# Patient Record
Sex: MALE | Race: WHITE | NOT HISPANIC OR LATINO | Employment: OTHER | URBAN - NONMETROPOLITAN AREA
[De-identification: names, ages, dates, MRNs, and addresses within clinical notes are randomized per-mention and may not be internally consistent; named-entity substitution may affect disease eponyms.]

---

## 2019-07-14 ENCOUNTER — HOSPITAL ENCOUNTER (EMERGENCY)
Facility: HOSPITAL | Age: 24
Discharge: LEFT BEFORE DISCHARGE | End: 2019-07-14

## 2019-07-14 VITALS
SYSTOLIC BLOOD PRESSURE: 153 MMHG | HEART RATE: 114 BPM | OXYGEN SATURATION: 96 % | TEMPERATURE: 97.9 F | RESPIRATION RATE: 20 BRPM | HEIGHT: 67 IN | BODY MASS INDEX: 49.44 KG/M2 | DIASTOLIC BLOOD PRESSURE: 97 MMHG | WEIGHT: 315 LBS

## 2020-06-18 ENCOUNTER — HOSPITAL ENCOUNTER (EMERGENCY)
Facility: HOSPITAL | Age: 25
Discharge: HOME | End: 2020-06-18
Attending: EMERGENCY MEDICINE | Admitting: EMERGENCY MEDICINE

## 2020-06-18 ENCOUNTER — APPOINTMENT (OUTPATIENT)
Dept: GENERAL RADIOLOGY | Facility: HOSPITAL | Age: 25
End: 2020-06-18

## 2020-06-18 VITALS
SYSTOLIC BLOOD PRESSURE: 126 MMHG | OXYGEN SATURATION: 97 % | HEART RATE: 120 BPM | RESPIRATION RATE: 20 BRPM | BODY MASS INDEX: 50.62 KG/M2 | HEIGHT: 66 IN | DIASTOLIC BLOOD PRESSURE: 70 MMHG | TEMPERATURE: 98.3 F | WEIGHT: 315 LBS

## 2020-06-18 DIAGNOSIS — R07.9 CHEST PAIN, UNSPECIFIED TYPE: Primary | ICD-10-CM

## 2020-06-18 LAB
ALBUMIN SERPL-MCNC: 4.2 G/DL (ref 3.5–5.2)
ALBUMIN/GLOB SERPL: 1.5 G/DL
ALP SERPL-CCNC: 72 U/L (ref 39–117)
ALT SERPL W P-5'-P-CCNC: 35 U/L (ref 1–41)
ANION GAP SERPL CALCULATED.3IONS-SCNC: 9 MMOL/L (ref 5–15)
AST SERPL-CCNC: 23 U/L (ref 1–40)
BASOPHILS # BLD AUTO: 0.04 10*3/MM3 (ref 0–0.2)
BASOPHILS NFR BLD AUTO: 0.4 % (ref 0–1.5)
BILIRUB SERPL-MCNC: 0.6 MG/DL (ref 0.2–1.2)
BUN BLD-MCNC: 14 MG/DL (ref 6–20)
BUN/CREAT SERPL: 18.9 (ref 7–25)
CALCIUM SPEC-SCNC: 8.9 MG/DL (ref 8.6–10.5)
CHLORIDE SERPL-SCNC: 104 MMOL/L (ref 98–107)
CO2 SERPL-SCNC: 27 MMOL/L (ref 22–29)
CREAT BLD-MCNC: 0.74 MG/DL (ref 0.76–1.27)
D-DIMER, QUANTITATIVE (MAD,POW, STR): 295 NG/ML (FEU) (ref 0–470)
DEPRECATED RDW RBC AUTO: 41.6 FL (ref 37–54)
EOSINOPHIL # BLD AUTO: 0.26 10*3/MM3 (ref 0–0.4)
EOSINOPHIL NFR BLD AUTO: 2.6 % (ref 0.3–6.2)
ERYTHROCYTE [DISTWIDTH] IN BLOOD BY AUTOMATED COUNT: 13.4 % (ref 12.3–15.4)
GFR SERPL CREATININE-BSD FRML MDRD: 129 ML/MIN/1.73
GLOBULIN UR ELPH-MCNC: 2.8 GM/DL
GLUCOSE BLD-MCNC: 174 MG/DL (ref 65–99)
HCT VFR BLD AUTO: 42.9 % (ref 37.5–51)
HGB BLD-MCNC: 14.1 G/DL (ref 13–17.7)
HOLD SPECIMEN: NORMAL
IMM GRANULOCYTES # BLD AUTO: 0.03 10*3/MM3 (ref 0–0.05)
IMM GRANULOCYTES NFR BLD AUTO: 0.3 % (ref 0–0.5)
LYMPHOCYTES # BLD AUTO: 2.8 10*3/MM3 (ref 0.7–3.1)
LYMPHOCYTES NFR BLD AUTO: 28 % (ref 19.6–45.3)
MCH RBC QN AUTO: 27.8 PG (ref 26.6–33)
MCHC RBC AUTO-ENTMCNC: 32.9 G/DL (ref 31.5–35.7)
MCV RBC AUTO: 84.4 FL (ref 79–97)
MONOCYTES # BLD AUTO: 0.55 10*3/MM3 (ref 0.1–0.9)
MONOCYTES NFR BLD AUTO: 5.5 % (ref 5–12)
NEUTROPHILS # BLD AUTO: 6.31 10*3/MM3 (ref 1.7–7)
NEUTROPHILS NFR BLD AUTO: 63.2 % (ref 42.7–76)
NRBC BLD AUTO-RTO: 0 /100 WBC (ref 0–0.2)
NT-PROBNP SERPL-MCNC: 7.7 PG/ML (ref 5–450)
PLATELET # BLD AUTO: 332 10*3/MM3 (ref 140–450)
PMV BLD AUTO: 9.8 FL (ref 6–12)
POTASSIUM BLD-SCNC: 4.4 MMOL/L (ref 3.5–5.2)
PROT SERPL-MCNC: 7 G/DL (ref 6–8.5)
RBC # BLD AUTO: 5.08 10*6/MM3 (ref 4.14–5.8)
SODIUM BLD-SCNC: 140 MMOL/L (ref 136–145)
TROPONIN T SERPL-MCNC: <0.01 NG/ML (ref 0–0.03)
WBC NRBC COR # BLD: 9.99 10*3/MM3 (ref 3.4–10.8)
WHOLE BLOOD HOLD SPECIMEN: NORMAL

## 2020-06-18 PROCEDURE — 80053 COMPREHEN METABOLIC PANEL: CPT | Performed by: PHYSICIAN ASSISTANT

## 2020-06-18 PROCEDURE — 93005 ELECTROCARDIOGRAM TRACING: CPT | Performed by: PHYSICIAN ASSISTANT

## 2020-06-18 PROCEDURE — 71045 X-RAY EXAM CHEST 1 VIEW: CPT

## 2020-06-18 PROCEDURE — 84484 ASSAY OF TROPONIN QUANT: CPT | Performed by: PHYSICIAN ASSISTANT

## 2020-06-18 PROCEDURE — 85025 COMPLETE CBC W/AUTO DIFF WBC: CPT | Performed by: PHYSICIAN ASSISTANT

## 2020-06-18 PROCEDURE — 99283 EMERGENCY DEPT VISIT LOW MDM: CPT

## 2020-06-18 PROCEDURE — 85379 FIBRIN DEGRADATION QUANT: CPT | Performed by: PHYSICIAN ASSISTANT

## 2020-06-18 PROCEDURE — 83880 ASSAY OF NATRIURETIC PEPTIDE: CPT | Performed by: PHYSICIAN ASSISTANT

## 2020-06-18 PROCEDURE — 93010 ELECTROCARDIOGRAM REPORT: CPT | Performed by: INTERNAL MEDICINE

## 2020-06-18 NOTE — ED PROVIDER NOTES
"Subjective   Patient presents to emergency department for exertional chest pain he developed while walking outside today.  Describes chest pain as \"on the right side felt like someone shot me with a 50 caliber, on the left side it was just a dull pressure.  States pain is since resolved.  Denies any cough, fever, leg pain/swelling.      History provided by:  Patient   used: No    Chest Pain   Pain location:  L chest and R chest  Pain quality: aching, pressure and stabbing    Pain radiates to:  Does not radiate  Pain severity:  Moderate  Onset quality:  Sudden  Duration:  1 hour  Progression:  Resolved  Chronicity:  New  Context comment:  Exertion  Associated symptoms: no abdominal pain, no back pain, no cough, no dysphagia, no fever, no nausea, no shortness of breath, no vomiting and no weakness        Review of Systems   Constitutional: Negative for chills and fever.   HENT: Negative for sore throat and trouble swallowing.    Eyes: Negative for visual disturbance.   Respiratory: Negative for cough, shortness of breath and wheezing.    Cardiovascular: Positive for chest pain. Negative for leg swelling.   Gastrointestinal: Negative for abdominal pain, nausea and vomiting.   Genitourinary: Negative for dysuria and flank pain.   Musculoskeletal: Negative for back pain.   Skin: Negative for color change.   Allergic/Immunologic: Negative for immunocompromised state.   Neurological: Negative for syncope and weakness.   Hematological: Does not bruise/bleed easily.   Psychiatric/Behavioral: Negative for confusion.       History reviewed. No pertinent past medical history.    Allergies   Allergen Reactions   • Amoxicillin Rash   • Penicillins Rash     redness       History reviewed. No pertinent surgical history.    No family history on file.    Social History     Socioeconomic History   • Marital status: Legally      Spouse name: Not on file   • Number of children: Not on file   • Years of " "education: Not on file   • Highest education level: Not on file           Objective      /75 (BP Location: Left arm, Patient Position: Lying)   Pulse 117   Temp 98.3 °F (36.8 °C) (Oral)   Resp 20   Ht 167.6 cm (66\")   Wt (!) 195 kg (429 lb 8 oz)   SpO2 97%   BMI 69.32 kg/m²     Physical Exam   Constitutional: He is oriented to person, place, and time. He appears well-developed and well-nourished. No distress.   HENT:   Head: Normocephalic and atraumatic.   Eyes: Conjunctivae are normal.   Cardiovascular: Normal rate, regular rhythm and normal heart sounds.   Pulmonary/Chest: Effort normal and breath sounds normal. No respiratory distress. He has no wheezes.   Musculoskeletal: Normal range of motion. He exhibits no edema or tenderness.   Neurological: He is alert and oriented to person, place, and time.   Skin: Skin is warm and dry. Capillary refill takes less than 2 seconds.   Psychiatric: He has a normal mood and affect. His behavior is normal. Thought content normal.   Nursing note and vitals reviewed.      ECG 12 Lead    Date/Time: 6/18/2020 4:34 PM  Performed by: Eddy Villatoro PA-C  Authorized by: Eddy Villatoro PA-C   Interpreted by physician  Comparison: not compared with previous ECG   Previous ECG: no previous ECG available  Rhythm: sinus tachycardia  Rate: tachycardic  BPM: 120  ST Segments: ST segments normal  Clinical impression: non-specific ECG                 ED Course      Results for orders placed or performed during the hospital encounter of 06/18/20   Comprehensive Metabolic Panel   Result Value Ref Range    Glucose 174 (H) 65 - 99 mg/dL    BUN 14 6 - 20 mg/dL    Creatinine 0.74 (L) 0.76 - 1.27 mg/dL    Sodium 140 136 - 145 mmol/L    Potassium 4.4 3.5 - 5.2 mmol/L    Chloride 104 98 - 107 mmol/L    CO2 27.0 22.0 - 29.0 mmol/L    Calcium 8.9 8.6 - 10.5 mg/dL    Total Protein 7.0 6.0 - 8.5 g/dL    Albumin 4.20 3.50 - 5.20 g/dL    ALT (SGPT) 35 1 - 41 U/L    AST (SGOT) 23 1 " - 40 U/L    Alkaline Phosphatase 72 39 - 117 U/L    Total Bilirubin 0.6 0.2 - 1.2 mg/dL    eGFR Non African Amer 129 >60 mL/min/1.73    Globulin 2.8 gm/dL    A/G Ratio 1.5 g/dL    BUN/Creatinine Ratio 18.9 7.0 - 25.0    Anion Gap 9.0 5.0 - 15.0 mmol/L   Troponin   Result Value Ref Range    Troponin T <0.010 0.000 - 0.030 ng/mL   BNP   Result Value Ref Range    proBNP 7.7 5.0 - 450.0 pg/mL   CBC Auto Differential   Result Value Ref Range    WBC 9.99 3.40 - 10.80 10*3/mm3    RBC 5.08 4.14 - 5.80 10*6/mm3    Hemoglobin 14.1 13.0 - 17.7 g/dL    Hematocrit 42.9 37.5 - 51.0 %    MCV 84.4 79.0 - 97.0 fL    MCH 27.8 26.6 - 33.0 pg    MCHC 32.9 31.5 - 35.7 g/dL    RDW 13.4 12.3 - 15.4 %    RDW-SD 41.6 37.0 - 54.0 fl    MPV 9.8 6.0 - 12.0 fL    Platelets 332 140 - 450 10*3/mm3    Neutrophil % 63.2 42.7 - 76.0 %    Lymphocyte % 28.0 19.6 - 45.3 %    Monocyte % 5.5 5.0 - 12.0 %    Eosinophil % 2.6 0.3 - 6.2 %    Basophil % 0.4 0.0 - 1.5 %    Immature Grans % 0.3 0.0 - 0.5 %    Neutrophils, Absolute 6.31 1.70 - 7.00 10*3/mm3    Lymphocytes, Absolute 2.80 0.70 - 3.10 10*3/mm3    Monocytes, Absolute 0.55 0.10 - 0.90 10*3/mm3    Eosinophils, Absolute 0.26 0.00 - 0.40 10*3/mm3    Basophils, Absolute 0.04 0.00 - 0.20 10*3/mm3    Immature Grans, Absolute 0.03 0.00 - 0.05 10*3/mm3    nRBC 0.0 0.0 - 0.2 /100 WBC   D-dimer, Quantitative   Result Value Ref Range    D-Dimer, Quantitative 295 0 - 470 ng/mL (FEU)   Light Blue Top   Result Value Ref Range    Extra Tube hold for add-on    Gold Top - SST   Result Value Ref Range    Extra Tube Hold for add-ons.      Xr Chest 1 View    Result Date: 6/18/2020  Narrative: PROCEDURE: Single view AP portable chest x-ray upright at 3:43 PM. INDICATION: Chest pain. COMPARISON: None. The heart, hilar and mediastinal structures normal. Pulmonary vascularity normal. Lungs are clear. No pleural effusion. Bony structures unremarkable.     Impression: No acute disease. [id number] Electronically signed by:   Juan David Nugent MD  6/18/2020 4:02 PM CDT Workstation: [id number]         Discussed results with patient.  Gave educational materials.  Advised close follow up with PCP/Cardiology.  Return to emergency department for new or worsening symptoms.                                  HEART Score (for prediction of 6-week risk of major adverse cardiac event) reviewed and/or performed as part of the patient evaluation and treatment planning process.  The result associated with this review/performance is: 1    PERC Rule (for pulmonary embolism) reviewed and/or performed as part of the patient evaluation and treatment planning process.  The result associated with this review/performance is: 1       MDM    Final diagnoses:   Chest pain, unspecified type            Eddy Villatoro PA-C  06/18/20 7525

## 2020-06-18 NOTE — ED NOTES
Phone update given to maria luisa [phone] and pankaj [phone] at pt's request      Adriana Barber RN  06/18/20 3836

## 2020-10-09 ENCOUNTER — APPOINTMENT (OUTPATIENT)
Dept: GENERAL RADIOLOGY | Facility: HOSPITAL | Age: 25
End: 2020-10-09

## 2020-10-09 ENCOUNTER — HOSPITAL ENCOUNTER (EMERGENCY)
Facility: HOSPITAL | Age: 25
Discharge: HOME | End: 2020-10-09
Attending: EMERGENCY MEDICINE | Admitting: EMERGENCY MEDICINE

## 2020-10-09 ENCOUNTER — APPOINTMENT (OUTPATIENT)
Dept: CT IMAGING | Facility: HOSPITAL | Age: 25
End: 2020-10-09

## 2020-10-09 VITALS
RESPIRATION RATE: 18 BRPM | TEMPERATURE: 97 F | OXYGEN SATURATION: 95 % | HEIGHT: 67 IN | BODY MASS INDEX: 49.44 KG/M2 | HEART RATE: 90 BPM | WEIGHT: 315 LBS | DIASTOLIC BLOOD PRESSURE: 89 MMHG | SYSTOLIC BLOOD PRESSURE: 149 MMHG
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
## 2020-10-09 DIAGNOSIS — S83.91XA SPRAIN OF RIGHT KNEE, UNSPECIFIED LIGAMENT, INITIAL ENCOUNTER: ICD-10-CM

## 2020-10-09 DIAGNOSIS — S53.401A ELBOW SPRAIN, RIGHT, INITIAL ENCOUNTER: ICD-10-CM

## 2020-10-09 DIAGNOSIS — S00.93XA CONTUSION OF HEAD, UNSPECIFIED PART OF HEAD, INITIAL ENCOUNTER: ICD-10-CM

## 2020-10-09 PROCEDURE — 73564 X-RAY EXAM KNEE 4 OR MORE: CPT

## 2020-10-09 PROCEDURE — 99283 EMERGENCY DEPT VISIT LOW MDM: CPT

## 2020-10-09 PROCEDURE — 70450 CT HEAD/BRAIN W/O DYE: CPT

## 2020-10-09 PROCEDURE — 73080 X-RAY EXAM OF ELBOW: CPT

## 2020-10-09 NOTE — ED NOTES
Pt states that his right leg is swollen compared to his left leg.  States that he cannot take his boot off and his pants leg is tighter than the other leg, when I attempted to visualize the right lower extremity.       Lisette Hagan RN  10/09/20 1824

## 2020-10-09 NOTE — ED NOTES
Pt was at [employer] last night and he stepped on a manhole cover that was loose.  States that it turned inward, where his leg dropped inside the hole and he fell to the ground hitting his elbow and the manhole cover hit his right leg.       Lisette Hagan, RN  10/09/20 4753

## 2020-10-10 NOTE — DISCHARGE INSTRUCTIONS
Please return with new or worsening symptoms.  Rest, ice, and elevate the affected extremities to help with discomfort and swelling.

## 2020-10-10 NOTE — ED PROVIDER NOTES
Subjective   25-year-old male presents to the emergency department with complaint of right elbow pain, right knee pain, and headache after a fall.  The fall occurred greater than 24 hours ago.  He reports that he is continued to have some intermittent confusion, and intermittent pain.  Has not really taken anything to help with symptoms.  Reports he keeps hitting his elbow on things and that causes increasing pain.  Reports pain with ambulation at the knee.  Denies any vomiting, blurred or double vision.  Reports that he was walking when he stepped on a manhole cover that then flipped.  He reports that his leg felt down in the manhole and the cover fell down on his leg.  He reports he was able to get out on his own and walk.    Family history, surgical history, social history, current medications and allergies are reviewed with the patient and triage documentation and vitals are reviewed.      History provided by:  Patient   used: No        Review of Systems   Constitutional: Negative for chills and fever.   HENT: Negative for congestion and sore throat.    Eyes: Negative for photophobia, pain and visual disturbance.   Respiratory: Negative for cough, shortness of breath and wheezing.    Cardiovascular: Negative for chest pain, palpitations and leg swelling.   Gastrointestinal: Negative for abdominal pain, nausea and vomiting.   Endocrine: Negative for polydipsia, polyphagia and polyuria.   Genitourinary: Negative.    Musculoskeletal: Positive for arthralgias and joint swelling. Negative for back pain, myalgias, neck pain and neck stiffness.   Skin: Negative for color change and wound.   Allergic/Immunologic: Negative.    Neurological: Positive for headaches. Negative for dizziness, syncope, facial asymmetry, speech difficulty, weakness, light-headedness and numbness.   Hematological: Negative.    Psychiatric/Behavioral: Negative.        Past Medical History:   Diagnosis Date   • Arthritis    •  Asthma    • Carpal tunnel syndrome    • Depression    • Hypertension    • PTSD (post-traumatic stress disorder)        Allergies   Allergen Reactions   • Amoxicillin Rash   • Penicillins Rash     redness       Past Surgical History:   Procedure Laterality Date   • ADENOIDECTOMY     • APPENDECTOMY     • CHOLECYSTECTOMY     • TONSILLECTOMY         History reviewed. No pertinent family history.    Social History     Socioeconomic History   • Marital status: Legally      Spouse name: Not on file   • Number of children: Not on file   • Years of education: Not on file   • Highest education level: Not on file   Tobacco Use   • Smoking status: Former Smoker   • Smokeless tobacco: Former User   Substance and Sexual Activity   • Alcohol use: Not Currently   • Sexual activity: Defer           Objective   Physical Exam  Vitals signs and nursing note reviewed.   Constitutional:       General: He is not in acute distress.     Appearance: He is obese. He is not ill-appearing, toxic-appearing or diaphoretic.   HENT:      Head: Normocephalic and atraumatic.   Eyes:      Extraocular Movements: Extraocular movements intact.      Pupils: Pupils are equal, round, and reactive to light.   Neck:      Musculoskeletal: Normal range of motion and neck supple. No neck rigidity or muscular tenderness.   Cardiovascular:      Rate and Rhythm: Normal rate and regular rhythm.      Pulses: Normal pulses.      Heart sounds: Normal heart sounds. No murmur.   Pulmonary:      Effort: Pulmonary effort is normal. No respiratory distress.      Breath sounds: Normal breath sounds. No wheezing, rhonchi or rales.   Musculoskeletal:      Right elbow: He exhibits normal range of motion, no swelling, no effusion, no deformity and no laceration. Tenderness found. Olecranon process tenderness noted.      Right knee: He exhibits normal range of motion, no swelling, no effusion, no ecchymosis, no deformity, no laceration, normal alignment, no LCL laxity,  normal patellar mobility, no bony tenderness and no MCL laxity. Tenderness found.   Skin:     General: Skin is warm and dry.      Capillary Refill: Capillary refill takes less than 2 seconds.   Neurological:      General: No focal deficit present.      Mental Status: He is alert and oriented to person, place, and time.      Motor: No weakness.      Gait: Gait normal.   Psychiatric:         Mood and Affect: Mood normal.         Behavior: Behavior normal.         Procedures  none         ED Course    Labs Reviewed - No data to display  Xr Elbow 3+ View Right    Result Date: 10/9/2020  Narrative: Right elbow x-ray three views on 10/9/2020 Clinical indications: Right elbow pain after fall COMPARISON: None FINDINGS: There are no fractures. Visualized joints are well aligned. No joint effusion to suggest an occult fracture is noted. No bony abnormality is noted.     Impression: No acute abnormality. Electronically signed by:  Uche Nicholas  10/9/2020 10:14 PM CDT Workstation: [id number]    Xr Knee 4+ View Right    Result Date: 10/9/2020  Narrative: Right knee x-ray four views on 10/9/2020 Clinical indication: Pain after fall COMPARISON: None FINDINGS: There is osteophyte formation along the femoral condyles and tibial plateau consistent with changes of osteoarthritis. No joint effusion is noted. There are no fractures. Visualized joints are well aligned.     Impression: Mild changes of osteoarthritis with no acute bony abnormality. Electronically signed by:  Uche Nicholas  10/9/2020 10:15 PM CDT Workstation: [id number]    Ct Head Without Contrast    Result Date: 10/9/2020  Narrative: CT head without contrast on  10/9/2020 CLINICAL INDICATION: Fell and hit head, confusion, possible loss of consciousness TECHNIQUE: Multiple axial images are obtained throughout the head without the administration of contrast. This exam was performed according to our departmental dose-optimization program, which includes automated exposure  control, adjustment of the mA and/or kV according to patient size and/or use of iterative reconstruction technique. Total DLP is 1200.7 mGy*cm. COMPARISON: None FINDINGS:   There is no hydrocephalus. There is no CT evidence of acute infarct. There is no hemorrhage. There are no abnormal extra-axial fluid collections. There is no mass, mass effect or midline shift. Small left frontal calvarial hemangioma is incidentally noted. No acute bony abnormality is noted.     Impression: No acute intracranial abnormality. Electronically signed by:  Uche Nicholas  10/9/2020 10:54 PM CDT Workstation: [id number]          MDM  Number of Diagnoses or Management Options  Accidental fall, initial encounter:   Contusion of head, unspecified part of head, initial encounter:   Elbow sprain, right, initial encounter:   Sprain of right knee, unspecified ligament, initial encounter:      Amount and/or Complexity of Data Reviewed  Tests in the radiology section of CPT®: reviewed    Patient Progress  Patient progress: stable    Imaging unremarkable.  Advised on symptomatic treatment of contusion and sprain as well as cognitive rest and agreeable to discharge.    Final diagnoses:   Accidental fall, initial encounter   Contusion of head, unspecified part of head, initial encounter   Elbow sprain, right, initial encounter   Sprain of right knee, unspecified ligament, initial encounter            Jerrod Glynn, DO  10/10/20 0533

## 2020-10-19 ENCOUNTER — APPOINTMENT (OUTPATIENT)
Dept: GENERAL RADIOLOGY | Facility: HOSPITAL | Age: 25
End: 2020-10-19

## 2020-10-19 ENCOUNTER — APPOINTMENT (OUTPATIENT)
Dept: CT IMAGING | Facility: HOSPITAL | Age: 25
End: 2020-10-19

## 2020-10-19 ENCOUNTER — HOSPITAL ENCOUNTER (EMERGENCY)
Facility: HOSPITAL | Age: 25
Discharge: HOME | End: 2020-10-19
Attending: FAMILY MEDICINE | Admitting: FAMILY MEDICINE

## 2020-10-19 VITALS
DIASTOLIC BLOOD PRESSURE: 58 MMHG | TEMPERATURE: 97.5 F | HEIGHT: 67 IN | RESPIRATION RATE: 22 BRPM | HEART RATE: 92 BPM | WEIGHT: 315 LBS | SYSTOLIC BLOOD PRESSURE: 131 MMHG | OXYGEN SATURATION: 98 % | BODY MASS INDEX: 49.44 KG/M2

## 2020-10-19 DIAGNOSIS — R51.9 ACUTE NONINTRACTABLE HEADACHE, UNSPECIFIED HEADACHE TYPE: Primary | ICD-10-CM

## 2020-10-19 DIAGNOSIS — R55 SYNCOPE, UNSPECIFIED SYNCOPE TYPE: ICD-10-CM

## 2020-10-19 LAB
ALBUMIN SERPL-MCNC: 4 G/DL (ref 3.5–5.2)
ALBUMIN/GLOB SERPL: 1.5 G/DL
ALP SERPL-CCNC: 62 U/L (ref 39–117)
ALT SERPL W P-5'-P-CCNC: 31 U/L (ref 1–41)
ANION GAP SERPL CALCULATED.3IONS-SCNC: 9 MMOL/L (ref 5–15)
AST SERPL-CCNC: 26 U/L (ref 1–40)
BASOPHILS # BLD AUTO: 0.04 10*3/MM3 (ref 0–0.2)
BASOPHILS NFR BLD AUTO: 0.4 % (ref 0–1.5)
BILIRUB SERPL-MCNC: 0.4 MG/DL (ref 0–1.2)
BUN SERPL-MCNC: 12 MG/DL (ref 6–20)
BUN/CREAT SERPL: 17.4 (ref 7–25)
CALCIUM SPEC-SCNC: 8.9 MG/DL (ref 8.6–10.5)
CHLORIDE SERPL-SCNC: 102 MMOL/L (ref 98–107)
CK SERPL-CCNC: 180 U/L (ref 20–200)
CO2 SERPL-SCNC: 25 MMOL/L (ref 22–29)
CREAT SERPL-MCNC: 0.69 MG/DL (ref 0.76–1.27)
DEPRECATED RDW RBC AUTO: 42.9 FL (ref 37–54)
EOSINOPHIL # BLD AUTO: 0.33 10*3/MM3 (ref 0–0.4)
EOSINOPHIL NFR BLD AUTO: 3 % (ref 0.3–6.2)
ERYTHROCYTE [DISTWIDTH] IN BLOOD BY AUTOMATED COUNT: 13.5 % (ref 12.3–15.4)
GFR SERPL CREATININE-BSD FRML MDRD: 139 ML/MIN/1.73
GLOBULIN UR ELPH-MCNC: 2.6 GM/DL
GLUCOSE SERPL-MCNC: 186 MG/DL (ref 65–99)
HCT VFR BLD AUTO: 41 % (ref 37.5–51)
HGB BLD-MCNC: 13.3 G/DL (ref 13–17.7)
HOLD SPECIMEN: NORMAL
HOLD SPECIMEN: NORMAL
IMM GRANULOCYTES # BLD AUTO: 0.06 10*3/MM3 (ref 0–0.05)
IMM GRANULOCYTES NFR BLD AUTO: 0.5 % (ref 0–0.5)
LIPASE SERPL-CCNC: 30 U/L (ref 13–60)
LYMPHOCYTES # BLD AUTO: 2.72 10*3/MM3 (ref 0.7–3.1)
LYMPHOCYTES NFR BLD AUTO: 24.5 % (ref 19.6–45.3)
MAGNESIUM SERPL-MCNC: 1.8 MG/DL (ref 1.6–2.6)
MCH RBC QN AUTO: 28.1 PG (ref 26.6–33)
MCHC RBC AUTO-ENTMCNC: 32.4 G/DL (ref 31.5–35.7)
MCV RBC AUTO: 86.7 FL (ref 79–97)
MONOCYTES # BLD AUTO: 0.58 10*3/MM3 (ref 0.1–0.9)
MONOCYTES NFR BLD AUTO: 5.2 % (ref 5–12)
NEUTROPHILS NFR BLD AUTO: 66.4 % (ref 42.7–76)
NEUTROPHILS NFR BLD AUTO: 7.39 10*3/MM3 (ref 1.7–7)
NRBC BLD AUTO-RTO: 0 /100 WBC (ref 0–0.2)
NT-PROBNP SERPL-MCNC: 8.8 PG/ML (ref 0–450)
PLATELET # BLD AUTO: 308 10*3/MM3 (ref 140–450)
PMV BLD AUTO: 10 FL (ref 6–12)
POTASSIUM SERPL-SCNC: 3.6 MMOL/L (ref 3.5–5.2)
PROT SERPL-MCNC: 6.6 G/DL (ref 6–8.5)
RBC # BLD AUTO: 4.73 10*6/MM3 (ref 4.14–5.8)
SARS-COV-2 N GENE RESP QL NAA+PROBE: NOT DETECTED
SODIUM SERPL-SCNC: 136 MMOL/L (ref 136–145)
TROPONIN T SERPL-MCNC: <0.01 NG/ML (ref 0–0.03)
TROPONIN T SERPL-MCNC: <0.01 NG/ML (ref 0–0.03)
WBC # BLD AUTO: 11.12 10*3/MM3 (ref 3.4–10.8)
WHOLE BLOOD HOLD SPECIMEN: NORMAL
WHOLE BLOOD HOLD SPECIMEN: NORMAL

## 2020-10-19 PROCEDURE — 93005 ELECTROCARDIOGRAM TRACING: CPT

## 2020-10-19 PROCEDURE — 82550 ASSAY OF CK (CPK): CPT | Performed by: FAMILY MEDICINE

## 2020-10-19 PROCEDURE — 83735 ASSAY OF MAGNESIUM: CPT | Performed by: FAMILY MEDICINE

## 2020-10-19 PROCEDURE — 93005 ELECTROCARDIOGRAM TRACING: CPT | Performed by: FAMILY MEDICINE

## 2020-10-19 PROCEDURE — 70450 CT HEAD/BRAIN W/O DYE: CPT

## 2020-10-19 PROCEDURE — 71045 X-RAY EXAM CHEST 1 VIEW: CPT

## 2020-10-19 PROCEDURE — 80053 COMPREHEN METABOLIC PANEL: CPT | Performed by: FAMILY MEDICINE

## 2020-10-19 PROCEDURE — 93010 ELECTROCARDIOGRAM REPORT: CPT | Performed by: INTERNAL MEDICINE

## 2020-10-19 PROCEDURE — 87635 SARS-COV-2 COVID-19 AMP PRB: CPT | Performed by: FAMILY MEDICINE

## 2020-10-19 PROCEDURE — 83690 ASSAY OF LIPASE: CPT | Performed by: FAMILY MEDICINE

## 2020-10-19 PROCEDURE — 83880 ASSAY OF NATRIURETIC PEPTIDE: CPT | Performed by: FAMILY MEDICINE

## 2020-10-19 PROCEDURE — 84484 ASSAY OF TROPONIN QUANT: CPT | Performed by: FAMILY MEDICINE

## 2020-10-19 PROCEDURE — C9803 HOPD COVID-19 SPEC COLLECT: HCPCS | Performed by: FAMILY MEDICINE

## 2020-10-19 PROCEDURE — 99284 EMERGENCY DEPT VISIT MOD MDM: CPT

## 2020-10-19 PROCEDURE — 85025 COMPLETE CBC W/AUTO DIFF WBC: CPT | Performed by: FAMILY MEDICINE

## 2020-10-19 RX ORDER — SODIUM CHLORIDE 0.9 % (FLUSH) 0.9 %
10 SYRINGE (ML) INJECTION AS NEEDED
Status: DISCONTINUED | OUTPATIENT
Start: 2020-10-19 | End: 2020-10-19 | Stop reason: HOSPADM

## 2020-10-19 RX ADMIN — SODIUM CHLORIDE 1000 ML: 900 INJECTION, SOLUTION INTRAVENOUS at 19:11
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
## 2020-10-20 NOTE — DISCHARGE INSTRUCTIONS
Please return with new or worsening symptoms.  Contact the primary care providers listed above to establish care and follow-up on your symptoms.

## 2020-10-29 ENCOUNTER — LAB (OUTPATIENT)
Dept: LAB | Facility: HOSPITAL | Age: 25
End: 2020-10-29

## 2020-10-29 ENCOUNTER — OFFICE VISIT (OUTPATIENT)
Dept: FAMILY MEDICINE CLINIC | Facility: CLINIC | Age: 25
End: 2020-10-29

## 2020-10-29 VITALS
HEIGHT: 66 IN | SYSTOLIC BLOOD PRESSURE: 128 MMHG | OXYGEN SATURATION: 98 % | DIASTOLIC BLOOD PRESSURE: 74 MMHG | TEMPERATURE: 97.1 F | BODY MASS INDEX: 50.62 KG/M2 | WEIGHT: 315 LBS | HEART RATE: 93 BPM

## 2020-10-29 DIAGNOSIS — R55 SYNCOPE AND COLLAPSE: Primary | ICD-10-CM

## 2020-10-29 DIAGNOSIS — E66.01 MORBID OBESITY WITH BMI OF 60.0-69.9, ADULT (HCC): ICD-10-CM

## 2020-10-29 DIAGNOSIS — G56.02 CARPAL TUNNEL SYNDROME OF LEFT WRIST: ICD-10-CM

## 2020-10-29 DIAGNOSIS — R55 SYNCOPE AND COLLAPSE: ICD-10-CM

## 2020-10-29 LAB
FOLATE SERPL-MCNC: 8.71 NG/ML (ref 4.78–24.2)
HBA1C MFR BLD: 6.63 % (ref 4.8–5.6)
TSH SERPL DL<=0.05 MIU/L-ACNC: 2.06 UIU/ML (ref 0.27–4.2)
VIT B12 BLD-MCNC: 413 PG/ML (ref 211–946)

## 2020-10-29 PROCEDURE — 84443 ASSAY THYROID STIM HORMONE: CPT

## 2020-10-29 PROCEDURE — 90471 IMMUNIZATION ADMIN: CPT | Performed by: CHIROPRACTOR

## 2020-10-29 PROCEDURE — 36415 COLL VENOUS BLD VENIPUNCTURE: CPT

## 2020-10-29 PROCEDURE — 82607 VITAMIN B-12: CPT

## 2020-10-29 PROCEDURE — 83036 HEMOGLOBIN GLYCOSYLATED A1C: CPT

## 2020-10-29 PROCEDURE — 82746 ASSAY OF FOLIC ACID SERUM: CPT

## 2020-10-29 PROCEDURE — 90686 IIV4 VACC NO PRSV 0.5 ML IM: CPT | Performed by: CHIROPRACTOR

## 2020-10-29 PROCEDURE — 99213 OFFICE O/P EST LOW 20 MIN: CPT | Performed by: CHIROPRACTOR
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
## 2020-11-02 ENCOUNTER — TELEPHONE (OUTPATIENT)
Dept: FAMILY MEDICINE CLINIC | Facility: CLINIC | Age: 25
End: 2020-11-02

## 2020-11-02 DIAGNOSIS — R55 SYNCOPE AND COLLAPSE: Primary | ICD-10-CM

## 2020-11-16 ENCOUNTER — TELEPHONE (OUTPATIENT)
Dept: PULMONOLOGY | Facility: CLINIC | Age: 25
End: 2020-11-16

## 2020-11-16 NOTE — TELEPHONE ENCOUNTER
Called patient to discuss results of echocardiogram and see if he was still falling asleep unexpectedly.  Left message on his voicemail.  For him to call back.  Dr. Arambula

## 2020-11-16 NOTE — TELEPHONE ENCOUNTER
Called and spoke with Mr. Parra.  Discussed his negative echocardiogram results.  He is going to have his MRI done on the 30th about an hour and a half before his appointment with me.  Hopefully I will have the results back by the time of his appointment be able to discuss some with him at that time.  Advised him to be careful while driving.  Dr. Arambula

## 2020-12-22 PROCEDURE — U0003 INFECTIOUS AGENT DETECTION BY NUCLEIC ACID (DNA OR RNA); SEVERE ACUTE RESPIRATORY SYNDROME CORONAVIRUS 2 (SARS-COV-2) (CORONAVIRUS DISEASE [COVID-19]), AMPLIFIED PROBE TECHNIQUE, MAKING USE OF HIGH THROUGHPUT TECHNOLOGIES AS DESCRIBED BY CMS-2020-01-R: HCPCS | Performed by: NURSE PRACTITIONER

## 2021-03-03 ENCOUNTER — APPOINTMENT (OUTPATIENT)
Dept: GENERAL RADIOLOGY | Facility: HOSPITAL | Age: 26
End: 2021-03-03

## 2021-03-03 ENCOUNTER — HOSPITAL ENCOUNTER (EMERGENCY)
Facility: HOSPITAL | Age: 26
Discharge: HOME | End: 2021-03-03
Attending: FAMILY MEDICINE | Admitting: FAMILY MEDICINE

## 2021-03-03 VITALS
SYSTOLIC BLOOD PRESSURE: 155 MMHG | HEART RATE: 90 BPM | DIASTOLIC BLOOD PRESSURE: 91 MMHG | TEMPERATURE: 97.9 F | RESPIRATION RATE: 20 BRPM | WEIGHT: 315 LBS | HEIGHT: 67 IN | OXYGEN SATURATION: 99 % | BODY MASS INDEX: 49.44 KG/M2

## 2021-03-03 DIAGNOSIS — R19.7 DIARRHEA, UNSPECIFIED TYPE: Primary | ICD-10-CM

## 2021-03-03 DIAGNOSIS — R10.33 PERIUMBILICAL ABDOMINAL PAIN: ICD-10-CM

## 2021-03-03 LAB
ALBUMIN SERPL-MCNC: 3.9 G/DL (ref 3.5–5.2)
ALBUMIN/GLOB SERPL: 1.1 G/DL
ALP SERPL-CCNC: 76 U/L (ref 39–117)
ALT SERPL W P-5'-P-CCNC: 35 U/L (ref 1–41)
ANION GAP SERPL CALCULATED.3IONS-SCNC: 10 MMOL/L (ref 5–15)
AST SERPL-CCNC: 23 U/L (ref 1–40)
BASOPHILS # BLD AUTO: 0.04 10*3/MM3 (ref 0–0.2)
BASOPHILS NFR BLD AUTO: 0.3 % (ref 0–1.5)
BILIRUB SERPL-MCNC: 0.3 MG/DL (ref 0–1.2)
BILIRUB UR QL STRIP: NEGATIVE
BUN SERPL-MCNC: 16 MG/DL (ref 6–20)
BUN/CREAT SERPL: 25 (ref 7–25)
CALCIUM SPEC-SCNC: 9.2 MG/DL (ref 8.6–10.5)
CHLORIDE SERPL-SCNC: 100 MMOL/L (ref 98–107)
CK SERPL-CCNC: 180 U/L (ref 20–200)
CLARITY UR: CLEAR
CO2 SERPL-SCNC: 24 MMOL/L (ref 22–29)
COLOR UR: YELLOW
CREAT SERPL-MCNC: 0.64 MG/DL (ref 0.76–1.27)
D-LACTATE SERPL-SCNC: 1.5 MMOL/L (ref 0.5–2)
DEPRECATED RDW RBC AUTO: 43.9 FL (ref 37–54)
EOSINOPHIL # BLD AUTO: 0.31 10*3/MM3 (ref 0–0.4)
EOSINOPHIL NFR BLD AUTO: 2.7 % (ref 0.3–6.2)
ERYTHROCYTE [DISTWIDTH] IN BLOOD BY AUTOMATED COUNT: 14.1 % (ref 12.3–15.4)
GFR SERPL CREATININE-BSD FRML MDRD: >150 ML/MIN/1.73
GLOBULIN UR ELPH-MCNC: 3.4 GM/DL
GLUCOSE SERPL-MCNC: 191 MG/DL (ref 65–99)
GLUCOSE UR STRIP-MCNC: NEGATIVE MG/DL
HCT VFR BLD AUTO: 43.7 % (ref 37.5–51)
HGB BLD-MCNC: 14.6 G/DL (ref 13–17.7)
HGB UR QL STRIP.AUTO: NEGATIVE
HOLD SPECIMEN: NORMAL
HOLD SPECIMEN: NORMAL
IMM GRANULOCYTES # BLD AUTO: 0.06 10*3/MM3 (ref 0–0.05)
IMM GRANULOCYTES NFR BLD AUTO: 0.5 % (ref 0–0.5)
KETONES UR QL STRIP: NEGATIVE
LEUKOCYTE ESTERASE UR QL STRIP.AUTO: NEGATIVE
LIPASE SERPL-CCNC: 38 U/L (ref 13–60)
LYMPHOCYTES # BLD AUTO: 2.34 10*3/MM3 (ref 0.7–3.1)
LYMPHOCYTES NFR BLD AUTO: 20.4 % (ref 19.6–45.3)
MCH RBC QN AUTO: 28.7 PG (ref 26.6–33)
MCHC RBC AUTO-ENTMCNC: 33.4 G/DL (ref 31.5–35.7)
MCV RBC AUTO: 85.9 FL (ref 79–97)
MONOCYTES # BLD AUTO: 0.71 10*3/MM3 (ref 0.1–0.9)
MONOCYTES NFR BLD AUTO: 6.2 % (ref 5–12)
NEUTROPHILS NFR BLD AUTO: 69.9 % (ref 42.7–76)
NEUTROPHILS NFR BLD AUTO: 8.02 10*3/MM3 (ref 1.7–7)
NITRITE UR QL STRIP: NEGATIVE
NRBC BLD AUTO-RTO: 0 /100 WBC (ref 0–0.2)
PH UR STRIP.AUTO: 5.5 [PH] (ref 5–9)
PLATELET # BLD AUTO: 308 10*3/MM3 (ref 140–450)
PMV BLD AUTO: 10 FL (ref 6–12)
POTASSIUM SERPL-SCNC: 4.2 MMOL/L (ref 3.5–5.2)
PROT SERPL-MCNC: 7.3 G/DL (ref 6–8.5)
PROT UR QL STRIP: NEGATIVE
RBC # BLD AUTO: 5.09 10*6/MM3 (ref 4.14–5.8)
SODIUM SERPL-SCNC: 134 MMOL/L (ref 136–145)
SP GR UR STRIP: 1.03 (ref 1–1.03)
UROBILINOGEN UR QL STRIP: NORMAL
WBC # BLD AUTO: 11.48 10*3/MM3 (ref 3.4–10.8)
WHOLE BLOOD HOLD SPECIMEN: NORMAL
WHOLE BLOOD HOLD SPECIMEN: NORMAL

## 2021-03-03 PROCEDURE — 96376 TX/PRO/DX INJ SAME DRUG ADON: CPT

## 2021-03-03 PROCEDURE — 25010000002 MORPHINE PER 10 MG: Performed by: FAMILY MEDICINE

## 2021-03-03 PROCEDURE — 25010000002 ONDANSETRON PER 1 MG: Performed by: FAMILY MEDICINE

## 2021-03-03 PROCEDURE — 83605 ASSAY OF LACTIC ACID: CPT | Performed by: FAMILY MEDICINE

## 2021-03-03 PROCEDURE — 85025 COMPLETE CBC W/AUTO DIFF WBC: CPT | Performed by: FAMILY MEDICINE

## 2021-03-03 PROCEDURE — 82550 ASSAY OF CK (CPK): CPT | Performed by: FAMILY MEDICINE

## 2021-03-03 PROCEDURE — 96375 TX/PRO/DX INJ NEW DRUG ADDON: CPT

## 2021-03-03 PROCEDURE — 81003 URINALYSIS AUTO W/O SCOPE: CPT | Performed by: FAMILY MEDICINE

## 2021-03-03 PROCEDURE — 99284 EMERGENCY DEPT VISIT MOD MDM: CPT

## 2021-03-03 PROCEDURE — 87040 BLOOD CULTURE FOR BACTERIA: CPT | Performed by: FAMILY MEDICINE

## 2021-03-03 PROCEDURE — 80053 COMPREHEN METABOLIC PANEL: CPT | Performed by: FAMILY MEDICINE

## 2021-03-03 PROCEDURE — 83690 ASSAY OF LIPASE: CPT | Performed by: FAMILY MEDICINE

## 2021-03-03 PROCEDURE — 96374 THER/PROPH/DIAG INJ IV PUSH: CPT

## 2021-03-03 PROCEDURE — 74022 RADEX COMPL AQT ABD SERIES: CPT

## 2021-03-03 RX ORDER — ONDANSETRON 2 MG/ML
4 INJECTION INTRAMUSCULAR; INTRAVENOUS ONCE
Status: COMPLETED | OUTPATIENT
Start: 2021-03-03 | End: 2021-03-03

## 2021-03-03 RX ORDER — SODIUM CHLORIDE 0.9 % (FLUSH) 0.9 %
10 SYRINGE (ML) INJECTION AS NEEDED
Status: DISCONTINUED | OUTPATIENT
Start: 2021-03-03 | End: 2021-03-03 | Stop reason: HOSPADM

## 2021-03-03 RX ORDER — ONDANSETRON 4 MG/1
4 TABLET, ORALLY DISINTEGRATING ORAL EVERY 6 HOURS PRN
Qty: 10 TABLET | Refills: 0 | Status: SHIPPED | OUTPATIENT
Start: 2021-03-03 | End: 2022-02-09

## 2021-03-03 RX ORDER — SODIUM CHLORIDE 9 MG/ML
125 INJECTION, SOLUTION INTRAVENOUS CONTINUOUS
Status: DISCONTINUED | OUTPATIENT
Start: 2021-03-03 | End: 2021-03-03 | Stop reason: HOSPADM

## 2021-03-03 RX ORDER — DICYCLOMINE HCL 20 MG
20 TABLET ORAL EVERY 6 HOURS PRN
Qty: 30 TABLET | Refills: 0 | Status: SHIPPED | OUTPATIENT
Start: 2021-03-03 | End: 2021-04-29

## 2021-03-03 RX ADMIN — SODIUM CHLORIDE 1000 ML: 900 INJECTION, SOLUTION INTRAVENOUS at 10:41

## 2021-03-03 RX ADMIN — ONDANSETRON HYDROCHLORIDE 4 MG: 2 INJECTION, SOLUTION INTRAMUSCULAR; INTRAVENOUS at 10:42

## 2021-03-03 RX ADMIN — MORPHINE SULFATE 4 MG: 4 INJECTION INTRAVENOUS at 10:42

## 2021-03-03 RX ADMIN — MORPHINE SULFATE 4 MG: 4 INJECTION INTRAVENOUS at 11:23

## 2021-03-03 NOTE — ED PROVIDER NOTES
Subjective   Nausea, diarrhea, abdominal pain began today. Quit smoking one year ago.       Diarrhea  The primary symptoms include fatigue, abdominal pain and nausea. Primary symptoms do not include fever, vomiting, diarrhea, dysuria, myalgias or rash. The illness began today.   The illness is also significant for chills.   Abdominal Pain  Associated symptoms: chills, fatigue, nausea and shortness of breath    Associated symptoms: no chest pain, no cough, no diarrhea, no dysuria, no fever, no sore throat and no vomiting        Review of Systems   Constitutional: Positive for activity change, chills and fatigue. Negative for appetite change, diaphoresis and fever.   HENT: Negative for congestion, ear discharge, ear pain, nosebleeds, rhinorrhea, sinus pressure, sore throat and trouble swallowing.    Eyes: Negative for discharge and redness.   Respiratory: Positive for shortness of breath. Negative for apnea, cough, chest tightness and wheezing.    Cardiovascular: Negative for chest pain.   Gastrointestinal: Positive for abdominal pain and nausea. Negative for diarrhea and vomiting.   Endocrine: Negative for polyuria.   Genitourinary: Negative for dysuria, frequency and urgency.   Musculoskeletal: Negative for myalgias and neck pain.   Skin: Negative for color change and rash.   Allergic/Immunologic: Negative for immunocompromised state.   Neurological: Positive for weakness. Negative for dizziness, seizures, syncope, light-headedness and headaches.   Hematological: Negative for adenopathy. Does not bruise/bleed easily.   Psychiatric/Behavioral: Negative for behavioral problems and confusion.   All other systems reviewed and are negative.      Past Medical History:   Diagnosis Date   • Arthritis    • Asthma    • Carpal tunnel syndrome    • Depression    • Hypertension    • PTSD (post-traumatic stress disorder)        Allergies   Allergen Reactions   • Amoxicillin Rash   • Penicillins Rash     redness       Past  Surgical History:   Procedure Laterality Date   • ADENOIDECTOMY     • APPENDECTOMY     • CHOLECYSTECTOMY     • TONSILLECTOMY         No family history on file.    Social History     Socioeconomic History   • Marital status:      Spouse name: Not on file   • Number of children: Not on file   • Years of education: Not on file   • Highest education level: Not on file   Tobacco Use   • Smoking status: Former Smoker   • Smokeless tobacco: Current User     Types: Chew   Substance and Sexual Activity   • Alcohol use: Not Currently   • Drug use: Never   • Sexual activity: Defer           Objective   Physical Exam  Vitals signs and nursing note reviewed.   Constitutional:       Appearance: He is well-developed.   HENT:      Head: Normocephalic and atraumatic.      Nose: Nose normal.   Eyes:      General: No scleral icterus.        Right eye: No discharge.         Left eye: No discharge.      Conjunctiva/sclera: Conjunctivae normal.      Pupils: Pupils are equal, round, and reactive to light.   Neck:      Musculoskeletal: Normal range of motion and neck supple.      Trachea: No tracheal deviation.   Cardiovascular:      Rate and Rhythm: Normal rate and regular rhythm.      Heart sounds: Normal heart sounds. No murmur.   Pulmonary:      Effort: Pulmonary effort is normal. No respiratory distress.      Breath sounds: Normal breath sounds. No stridor. No wheezing or rales.   Abdominal:      General: Bowel sounds are normal. There is no distension.      Palpations: Abdomen is soft. There is no mass.      Tenderness: There is abdominal tenderness in the periumbilical area. There is no guarding or rebound.   Skin:     General: Skin is warm and dry.      Findings: No erythema or rash.   Neurological:      Mental Status: He is alert and oriented to person, place, and time.      Coordination: Coordination normal.   Psychiatric:         Behavior: Behavior normal.         Thought Content: Thought content normal.          Procedures           ED Course             Labs Reviewed   COMPREHENSIVE METABOLIC PANEL - Abnormal; Notable for the following components:       Result Value    Glucose 191 (*)     Creatinine 0.64 (*)     Sodium 134 (*)     All other components within normal limits    Narrative:     GFR Normal >60  Chronic Kidney Disease <60  Kidney Failure <15     CBC WITH AUTO DIFFERENTIAL - Abnormal; Notable for the following components:    WBC 11.48 (*)     Neutrophils, Absolute 8.02 (*)     Immature Grans, Absolute 0.06 (*)     All other components within normal limits   LACTIC ACID, PLASMA - Normal   URINALYSIS W/ MICROSCOPIC IF INDICATED (NO CULTURE) - Normal    Narrative:     Urine microscopic not indicated.   LIPASE - Normal   CK - Normal   BLOOD CULTURE   BLOOD CULTURE   RAINBOW DRAW    Narrative:     The following orders were created for panel order [place] Draw.  Procedure                               Abnormality         Status                     ---------                               -----------         ------                     Light Blue Top[[id number]]                                   Final result               Green Top (Gel)[[id number]]                                  Final result               Lavender Top[[id number]]                                     Final result               Gold Top - SST[[id number]]                                   Final result                 Please view results for these tests on the individual orders.   CBC AND DIFFERENTIAL    Narrative:     The following orders were created for panel order CBC & Differential.  Procedure                               Abnormality         Status                     ---------                               -----------         ------                     CBC Auto Differential[[id number]]        Abnormal            Final result                 Please view results for these tests on the individual orders.   LIGHT BLUE TOP   GREEN TOP   LAVENDER TOP    GOLD TOP - SST       XR Abdomen 2+ VW with Chest 1 VW   Final Result   Impression: Negative acute abdomen series.      Electronically signed by:  Parish Huynh MD  3/3/2021 12:05 PM CST   Workstation: [id number]                                          [facility]    Final diagnoses:   Diarrhea, unspecified type   Periumbilical abdominal pain            Kei Arango MD  03/03/21 1239

## 2021-03-08 LAB — BACTERIA SPEC AEROBE CULT: NORMAL

## 2021-04-28 ENCOUNTER — APPOINTMENT (OUTPATIENT)
Dept: CT IMAGING | Facility: HOSPITAL | Age: 26
End: 2021-04-28

## 2021-04-28 ENCOUNTER — HOSPITAL ENCOUNTER (EMERGENCY)
Facility: HOSPITAL | Age: 26
Discharge: HOME | End: 2021-04-29
Attending: EMERGENCY MEDICINE | Admitting: EMERGENCY MEDICINE

## 2021-04-28 VITALS
HEIGHT: 67 IN | SYSTOLIC BLOOD PRESSURE: 155 MMHG | RESPIRATION RATE: 20 BRPM | OXYGEN SATURATION: 98 % | HEART RATE: 97 BPM | DIASTOLIC BLOOD PRESSURE: 86 MMHG | BODY MASS INDEX: 49.44 KG/M2 | TEMPERATURE: 97.5 F | WEIGHT: 315 LBS

## 2021-04-28 DIAGNOSIS — R10.9 ABDOMINAL PAIN, UNSPECIFIED ABDOMINAL LOCATION: Primary | ICD-10-CM

## 2021-04-28 DIAGNOSIS — R19.7 DIARRHEA, UNSPECIFIED TYPE: ICD-10-CM

## 2021-04-28 LAB
ALBUMIN SERPL-MCNC: 3.8 G/DL (ref 3.5–5.2)
ALBUMIN/GLOB SERPL: 1.2 G/DL
ALP SERPL-CCNC: 71 U/L (ref 39–117)
ALT SERPL W P-5'-P-CCNC: 36 U/L (ref 1–41)
ANION GAP SERPL CALCULATED.3IONS-SCNC: 9 MMOL/L (ref 5–15)
AST SERPL-CCNC: 21 U/L (ref 1–40)
BASOPHILS # BLD AUTO: 0.05 10*3/MM3 (ref 0–0.2)
BASOPHILS NFR BLD AUTO: 0.5 % (ref 0–1.5)
BILIRUB SERPL-MCNC: 0.5 MG/DL (ref 0–1.2)
BILIRUB UR QL STRIP: NEGATIVE
BUN SERPL-MCNC: 12 MG/DL (ref 6–20)
BUN/CREAT SERPL: 14.8 (ref 7–25)
CALCIUM SPEC-SCNC: 9.4 MG/DL (ref 8.6–10.5)
CHLORIDE SERPL-SCNC: 101 MMOL/L (ref 98–107)
CLARITY UR: CLEAR
CO2 SERPL-SCNC: 28 MMOL/L (ref 22–29)
COLOR UR: YELLOW
CREAT SERPL-MCNC: 0.81 MG/DL (ref 0.76–1.27)
DEPRECATED RDW RBC AUTO: 43 FL (ref 37–54)
EOSINOPHIL # BLD AUTO: 0.36 10*3/MM3 (ref 0–0.4)
EOSINOPHIL NFR BLD AUTO: 3.4 % (ref 0.3–6.2)
ERYTHROCYTE [DISTWIDTH] IN BLOOD BY AUTOMATED COUNT: 13.8 % (ref 12.3–15.4)
GFR SERPL CREATININE-BSD FRML MDRD: 115 ML/MIN/1.73
GLOBULIN UR ELPH-MCNC: 3.1 GM/DL
GLUCOSE SERPL-MCNC: 165 MG/DL (ref 65–99)
GLUCOSE UR STRIP-MCNC: ABNORMAL MG/DL
HCT VFR BLD AUTO: 40.9 % (ref 37.5–51)
HGB BLD-MCNC: 13.3 G/DL (ref 13–17.7)
HGB UR QL STRIP.AUTO: NEGATIVE
HOLD SPECIMEN: NORMAL
HOLD SPECIMEN: NORMAL
IMM GRANULOCYTES # BLD AUTO: 0.06 10*3/MM3 (ref 0–0.05)
IMM GRANULOCYTES NFR BLD AUTO: 0.6 % (ref 0–0.5)
KETONES UR QL STRIP: NEGATIVE
LEUKOCYTE ESTERASE UR QL STRIP.AUTO: NEGATIVE
LIPASE SERPL-CCNC: 38 U/L (ref 13–60)
LYMPHOCYTES # BLD AUTO: 2.87 10*3/MM3 (ref 0.7–3.1)
LYMPHOCYTES NFR BLD AUTO: 26.9 % (ref 19.6–45.3)
MCH RBC QN AUTO: 27.8 PG (ref 26.6–33)
MCHC RBC AUTO-ENTMCNC: 32.5 G/DL (ref 31.5–35.7)
MCV RBC AUTO: 85.6 FL (ref 79–97)
MONOCYTES # BLD AUTO: 0.65 10*3/MM3 (ref 0.1–0.9)
MONOCYTES NFR BLD AUTO: 6.1 % (ref 5–12)
NEUTROPHILS NFR BLD AUTO: 6.66 10*3/MM3 (ref 1.7–7)
NEUTROPHILS NFR BLD AUTO: 62.5 % (ref 42.7–76)
NITRITE UR QL STRIP: NEGATIVE
NRBC BLD AUTO-RTO: 0 /100 WBC (ref 0–0.2)
PH UR STRIP.AUTO: <=5 [PH] (ref 5–9)
PLATELET # BLD AUTO: 323 10*3/MM3 (ref 140–450)
PMV BLD AUTO: 9.9 FL (ref 6–12)
POTASSIUM SERPL-SCNC: 3.8 MMOL/L (ref 3.5–5.2)
PROT SERPL-MCNC: 6.9 G/DL (ref 6–8.5)
PROT UR QL STRIP: NEGATIVE
RBC # BLD AUTO: 4.78 10*6/MM3 (ref 4.14–5.8)
SODIUM SERPL-SCNC: 138 MMOL/L (ref 136–145)
SP GR UR STRIP: 1.04 (ref 1–1.03)
UROBILINOGEN UR QL STRIP: ABNORMAL
WBC # BLD AUTO: 10.65 10*3/MM3 (ref 3.4–10.8)
WHOLE BLOOD HOLD SPECIMEN: NORMAL
WHOLE BLOOD HOLD SPECIMEN: NORMAL

## 2021-04-28 PROCEDURE — 83690 ASSAY OF LIPASE: CPT | Performed by: EMERGENCY MEDICINE

## 2021-04-28 PROCEDURE — 25010000002 IOPAMIDOL 61 % SOLUTION: Performed by: EMERGENCY MEDICINE

## 2021-04-28 PROCEDURE — 74177 CT ABD & PELVIS W/CONTRAST: CPT

## 2021-04-28 PROCEDURE — 80053 COMPREHEN METABOLIC PANEL: CPT | Performed by: EMERGENCY MEDICINE

## 2021-04-28 PROCEDURE — 96360 HYDRATION IV INFUSION INIT: CPT

## 2021-04-28 PROCEDURE — 85025 COMPLETE CBC W/AUTO DIFF WBC: CPT | Performed by: EMERGENCY MEDICINE

## 2021-04-28 PROCEDURE — 25010000002 DICYCLOMINE PER 20 MG: Performed by: STUDENT IN AN ORGANIZED HEALTH CARE EDUCATION/TRAINING PROGRAM

## 2021-04-28 PROCEDURE — 96372 THER/PROPH/DIAG INJ SC/IM: CPT

## 2021-04-28 PROCEDURE — 99283 EMERGENCY DEPT VISIT LOW MDM: CPT

## 2021-04-28 PROCEDURE — 81003 URINALYSIS AUTO W/O SCOPE: CPT | Performed by: STUDENT IN AN ORGANIZED HEALTH CARE EDUCATION/TRAINING PROGRAM

## 2021-04-28 RX ORDER — DICYCLOMINE HYDROCHLORIDE 10 MG/ML
20 INJECTION INTRAMUSCULAR ONCE
Status: COMPLETED | OUTPATIENT
Start: 2021-04-28 | End: 2021-04-28

## 2021-04-28 RX ORDER — SODIUM CHLORIDE 0.9 % (FLUSH) 0.9 %
10 SYRINGE (ML) INJECTION AS NEEDED
Status: DISCONTINUED | OUTPATIENT
Start: 2021-04-28 | End: 2021-04-29 | Stop reason: HOSPADM

## 2021-04-28 RX ADMIN — DICYCLOMINE HYDROCHLORIDE 20 MG: 20 INJECTION, SOLUTION INTRAMUSCULAR at 22:43

## 2021-04-28 RX ADMIN — IOPAMIDOL 95 ML: 612 INJECTION, SOLUTION INTRAVENOUS at 23:17

## 2021-04-28 RX ADMIN — Medication 10 ML: at 22:43

## 2021-04-28 RX ADMIN — SODIUM CHLORIDE 1000 ML: 900 INJECTION, SOLUTION INTRAVENOUS at 22:43

## 2021-04-29 RX ORDER — DICYCLOMINE HCL 20 MG
20 TABLET ORAL EVERY 6 HOURS PRN
Qty: 24 TABLET | Refills: 0 | Status: ON HOLD | OUTPATIENT
Start: 2021-04-29 | End: 2021-06-20

## 2021-06-20 ENCOUNTER — APPOINTMENT (OUTPATIENT)
Dept: CT IMAGING | Facility: HOSPITAL | Age: 26
End: 2021-06-20

## 2021-06-20 ENCOUNTER — HOSPITAL ENCOUNTER (OUTPATIENT)
Facility: HOSPITAL | Age: 26
Setting detail: OBSERVATION
Discharge: HOME | End: 2021-06-22
Attending: EMERGENCY MEDICINE | Admitting: HOSPITALIST

## 2021-06-20 ENCOUNTER — APPOINTMENT (OUTPATIENT)
Dept: GENERAL RADIOLOGY | Facility: HOSPITAL | Age: 26
End: 2021-06-20

## 2021-06-20 DIAGNOSIS — R55 SYNCOPE AND COLLAPSE: ICD-10-CM

## 2021-06-20 DIAGNOSIS — Z74.09 IMPAIRED MOBILITY AND ADLS: ICD-10-CM

## 2021-06-20 DIAGNOSIS — R20.0 NUMBNESS ON LEFT SIDE: ICD-10-CM

## 2021-06-20 DIAGNOSIS — R53.1 WEAKNESS: Primary | ICD-10-CM

## 2021-06-20 DIAGNOSIS — Z78.9 IMPAIRED MOBILITY AND ADLS: ICD-10-CM

## 2021-06-20 DIAGNOSIS — Z74.09 IMPAIRED FUNCTIONAL MOBILITY, BALANCE, GAIT, AND ENDURANCE: ICD-10-CM

## 2021-06-20 DIAGNOSIS — E66.01 MORBID OBESITY (HCC): ICD-10-CM

## 2021-06-20 DIAGNOSIS — G45.9 TRANSIENT ISCHEMIC ATTACK (TIA): ICD-10-CM

## 2021-06-20 LAB
ALBUMIN SERPL-MCNC: 3.8 G/DL (ref 3.5–5.2)
ALBUMIN/GLOB SERPL: 1.5 G/DL
ALP SERPL-CCNC: 82 U/L (ref 39–117)
ALT SERPL W P-5'-P-CCNC: 38 U/L (ref 1–41)
AMPHET+METHAMPHET UR QL: NEGATIVE
AMPHETAMINES UR QL: NEGATIVE
ANION GAP SERPL CALCULATED.3IONS-SCNC: 9 MMOL/L (ref 5–15)
APTT PPP: 27.5 SECONDS (ref 20–40.3)
AST SERPL-CCNC: 22 U/L (ref 1–40)
BARBITURATES UR QL SCN: NEGATIVE
BASOPHILS # BLD AUTO: 0.04 10*3/MM3 (ref 0–0.2)
BASOPHILS NFR BLD AUTO: 0.4 % (ref 0–1.5)
BENZODIAZ UR QL SCN: NEGATIVE
BILIRUB SERPL-MCNC: 0.6 MG/DL (ref 0–1.2)
BUN SERPL-MCNC: 10 MG/DL (ref 6–20)
BUN/CREAT SERPL: 14.7 (ref 7–25)
BUPRENORPHINE SERPL-MCNC: NEGATIVE NG/ML
CALCIUM SPEC-SCNC: 9.1 MG/DL (ref 8.6–10.5)
CANNABINOIDS SERPL QL: NEGATIVE
CHLORIDE SERPL-SCNC: 102 MMOL/L (ref 98–107)
CHOLEST SERPL-MCNC: 170 MG/DL (ref 0–200)
CO2 SERPL-SCNC: 27 MMOL/L (ref 22–29)
COCAINE UR QL: NEGATIVE
CREAT SERPL-MCNC: 0.68 MG/DL (ref 0.76–1.27)
DEPRECATED RDW RBC AUTO: 43.6 FL (ref 37–54)
EOSINOPHIL # BLD AUTO: 0.47 10*3/MM3 (ref 0–0.4)
EOSINOPHIL NFR BLD AUTO: 4.6 % (ref 0.3–6.2)
ERYTHROCYTE [DISTWIDTH] IN BLOOD BY AUTOMATED COUNT: 14.3 % (ref 12.3–15.4)
ERYTHROCYTE [SEDIMENTATION RATE] IN BLOOD: 15 MM/HR (ref 0–15)
FLUAV SUBTYP SPEC NAA+PROBE: NOT DETECTED
FLUBV RNA ISLT QL NAA+PROBE: NOT DETECTED
GFR SERPL CREATININE-BSD FRML MDRD: 141 ML/MIN/1.73
GLOBULIN UR ELPH-MCNC: 2.6 GM/DL
GLUCOSE BLDC GLUCOMTR-MCNC: 154 MG/DL (ref 70–130)
GLUCOSE BLDC GLUCOMTR-MCNC: 181 MG/DL (ref 70–130)
GLUCOSE SERPL-MCNC: 192 MG/DL (ref 65–99)
HBA1C MFR BLD: 7.2 % (ref 4.8–5.6)
HCT VFR BLD AUTO: 39.2 % (ref 37.5–51)
HDLC SERPL-MCNC: 35 MG/DL (ref 40–60)
HGB BLD-MCNC: 12.6 G/DL (ref 13–17.7)
HOLD SPECIMEN: NORMAL
HOLD SPECIMEN: NORMAL
IMM GRANULOCYTES # BLD AUTO: 0.04 10*3/MM3 (ref 0–0.05)
IMM GRANULOCYTES NFR BLD AUTO: 0.4 % (ref 0–0.5)
INR PPP: 1.02 (ref 0.8–1.2)
LDLC SERPL CALC-MCNC: 93 MG/DL (ref 0–100)
LDLC/HDLC SERPL: 2.45 {RATIO}
LYMPHOCYTES # BLD AUTO: 2.96 10*3/MM3 (ref 0.7–3.1)
LYMPHOCYTES NFR BLD AUTO: 29 % (ref 19.6–45.3)
MCH RBC QN AUTO: 27.3 PG (ref 26.6–33)
MCHC RBC AUTO-ENTMCNC: 32.1 G/DL (ref 31.5–35.7)
MCV RBC AUTO: 84.8 FL (ref 79–97)
METHADONE UR QL SCN: NEGATIVE
MONOCYTES # BLD AUTO: 0.59 10*3/MM3 (ref 0.1–0.9)
MONOCYTES NFR BLD AUTO: 5.8 % (ref 5–12)
NEUTROPHILS NFR BLD AUTO: 59.8 % (ref 42.7–76)
NEUTROPHILS NFR BLD AUTO: 6.1 10*3/MM3 (ref 1.7–7)
NRBC BLD AUTO-RTO: 0 /100 WBC (ref 0–0.2)
OPIATES UR QL: NEGATIVE
OXYCODONE UR QL SCN: NEGATIVE
PCP UR QL SCN: NEGATIVE
PLATELET # BLD AUTO: 304 10*3/MM3 (ref 140–450)
PMV BLD AUTO: 9.7 FL (ref 6–12)
POTASSIUM SERPL-SCNC: 3.7 MMOL/L (ref 3.5–5.2)
PROPOXYPH UR QL: NEGATIVE
PROT SERPL-MCNC: 6.4 G/DL (ref 6–8.5)
PROTHROMBIN TIME: 13.3 SECONDS (ref 11.1–15.3)
QT INTERVAL: 376 MS
QTC INTERVAL: 457 MS
RBC # BLD AUTO: 4.62 10*6/MM3 (ref 4.14–5.8)
SARS-COV-2 RNA PNL SPEC NAA+PROBE: NOT DETECTED
SODIUM SERPL-SCNC: 138 MMOL/L (ref 136–145)
TRICYCLICS UR QL SCN: NEGATIVE
TRIGL SERPL-MCNC: 246 MG/DL (ref 0–150)
TROPONIN T SERPL-MCNC: <0.01 NG/ML (ref 0–0.03)
TSH SERPL DL<=0.05 MIU/L-ACNC: 2.31 UIU/ML (ref 0.27–4.2)
URATE SERPL-MCNC: 4.2 MG/DL (ref 3.4–7)
VIT B12 BLD-MCNC: 337 PG/ML (ref 211–946)
VLDLC SERPL-MCNC: 42 MG/DL (ref 5–40)
WBC # BLD AUTO: 10.2 10*3/MM3 (ref 3.4–10.8)
WHOLE BLOOD HOLD SPECIMEN: NORMAL

## 2021-06-20 PROCEDURE — 82962 GLUCOSE BLOOD TEST: CPT

## 2021-06-20 PROCEDURE — 96360 HYDRATION IV INFUSION INIT: CPT

## 2021-06-20 PROCEDURE — 83036 HEMOGLOBIN GLYCOSYLATED A1C: CPT | Performed by: INTERNAL MEDICINE

## 2021-06-20 PROCEDURE — G0378 HOSPITAL OBSERVATION PER HR: HCPCS

## 2021-06-20 PROCEDURE — 85651 RBC SED RATE NONAUTOMATED: CPT | Performed by: INTERNAL MEDICINE

## 2021-06-20 PROCEDURE — 36415 COLL VENOUS BLD VENIPUNCTURE: CPT | Performed by: INTERNAL MEDICINE

## 2021-06-20 PROCEDURE — 80050 GENERAL HEALTH PANEL: CPT

## 2021-06-20 PROCEDURE — 99285 EMERGENCY DEPT VISIT HI MDM: CPT

## 2021-06-20 PROCEDURE — 99204 OFFICE O/P NEW MOD 45 MIN: CPT | Performed by: PSYCHIATRY & NEUROLOGY

## 2021-06-20 PROCEDURE — 71045 X-RAY EXAM CHEST 1 VIEW: CPT

## 2021-06-20 PROCEDURE — 93010 ELECTROCARDIOGRAM REPORT: CPT | Performed by: INTERNAL MEDICINE

## 2021-06-20 PROCEDURE — 85610 PROTHROMBIN TIME: CPT | Performed by: EMERGENCY MEDICINE

## 2021-06-20 PROCEDURE — 0 IOPAMIDOL PER 1 ML: Performed by: EMERGENCY MEDICINE

## 2021-06-20 PROCEDURE — 87636 SARSCOV2 & INF A&B AMP PRB: CPT | Performed by: EMERGENCY MEDICINE

## 2021-06-20 PROCEDURE — 96361 HYDRATE IV INFUSION ADD-ON: CPT

## 2021-06-20 PROCEDURE — 70498 CT ANGIOGRAPHY NECK: CPT

## 2021-06-20 PROCEDURE — 84484 ASSAY OF TROPONIN QUANT: CPT | Performed by: EMERGENCY MEDICINE

## 2021-06-20 PROCEDURE — C9803 HOPD COVID-19 SPEC COLLECT: HCPCS

## 2021-06-20 PROCEDURE — 80061 LIPID PANEL: CPT | Performed by: INTERNAL MEDICINE

## 2021-06-20 PROCEDURE — 82607 VITAMIN B-12: CPT | Performed by: INTERNAL MEDICINE

## 2021-06-20 PROCEDURE — 85730 THROMBOPLASTIN TIME PARTIAL: CPT | Performed by: EMERGENCY MEDICINE

## 2021-06-20 PROCEDURE — 92610 EVALUATE SWALLOWING FUNCTION: CPT | Performed by: SPEECH-LANGUAGE PATHOLOGIST

## 2021-06-20 PROCEDURE — 93005 ELECTROCARDIOGRAM TRACING: CPT | Performed by: EMERGENCY MEDICINE

## 2021-06-20 PROCEDURE — 84550 ASSAY OF BLOOD/URIC ACID: CPT | Performed by: INTERNAL MEDICINE

## 2021-06-20 PROCEDURE — 70496 CT ANGIOGRAPHY HEAD: CPT

## 2021-06-20 PROCEDURE — 70450 CT HEAD/BRAIN W/O DYE: CPT

## 2021-06-20 PROCEDURE — 80306 DRUG TEST PRSMV INSTRMNT: CPT | Performed by: EMERGENCY MEDICINE

## 2021-06-20 RX ORDER — FAMOTIDINE 10 MG
10 TABLET ORAL 2 TIMES DAILY
COMMUNITY
End: 2022-02-09

## 2021-06-20 RX ORDER — SODIUM CHLORIDE 0.9 % (FLUSH) 0.9 %
10 SYRINGE (ML) INJECTION AS NEEDED
Status: DISCONTINUED | OUTPATIENT
Start: 2021-06-20 | End: 2021-06-22 | Stop reason: HOSPADM

## 2021-06-20 RX ORDER — CALCIUM CARBONATE 200(500)MG
2 TABLET,CHEWABLE ORAL 2 TIMES DAILY PRN
Status: DISCONTINUED | OUTPATIENT
Start: 2021-06-20 | End: 2021-06-22 | Stop reason: HOSPADM

## 2021-06-20 RX ORDER — SODIUM CHLORIDE 9 MG/ML
125 INJECTION, SOLUTION INTRAVENOUS CONTINUOUS
Status: DISCONTINUED | OUTPATIENT
Start: 2021-06-20 | End: 2021-06-21

## 2021-06-20 RX ORDER — BENZONATATE 100 MG/1
200 CAPSULE ORAL 3 TIMES DAILY PRN
Status: DISCONTINUED | OUTPATIENT
Start: 2021-06-20 | End: 2021-06-22 | Stop reason: HOSPADM

## 2021-06-20 RX ORDER — LOSARTAN POTASSIUM 50 MG/1
50 TABLET ORAL
Status: DISCONTINUED | OUTPATIENT
Start: 2021-06-20 | End: 2021-06-22 | Stop reason: HOSPADM

## 2021-06-20 RX ORDER — ENALAPRILAT 2.5 MG/2ML
0.62 INJECTION INTRAVENOUS EVERY 6 HOURS PRN
Status: DISCONTINUED | OUTPATIENT
Start: 2021-06-20 | End: 2021-06-22 | Stop reason: HOSPADM

## 2021-06-20 RX ORDER — FLUTICASONE PROPIONATE 50 MCG
1 SPRAY, SUSPENSION (ML) NASAL DAILY
Status: DISCONTINUED | OUTPATIENT
Start: 2021-06-20 | End: 2021-06-22 | Stop reason: HOSPADM

## 2021-06-20 RX ORDER — GUAIFENESIN AND CODEINE PHOSPHATE 100; 10 MG/5ML; MG/5ML
10 SOLUTION ORAL EVERY 6 HOURS PRN
Status: DISCONTINUED | OUTPATIENT
Start: 2021-06-20 | End: 2021-06-22 | Stop reason: HOSPADM

## 2021-06-20 RX ORDER — DICYCLOMINE HCL 20 MG
20 TABLET ORAL EVERY 6 HOURS PRN
Status: DISCONTINUED | OUTPATIENT
Start: 2021-06-20 | End: 2021-06-22 | Stop reason: HOSPADM

## 2021-06-20 RX ORDER — ASPIRIN 325 MG
325 TABLET ORAL DAILY
Status: DISCONTINUED | OUTPATIENT
Start: 2021-06-20 | End: 2021-06-22 | Stop reason: HOSPADM

## 2021-06-20 RX ORDER — ALBUTEROL SULFATE 2.5 MG/3ML
2.5 SOLUTION RESPIRATORY (INHALATION) EVERY 4 HOURS PRN
Status: DISCONTINUED | OUTPATIENT
Start: 2021-06-20 | End: 2021-06-22 | Stop reason: HOSPADM

## 2021-06-20 RX ORDER — SODIUM CHLORIDE 0.9 % (FLUSH) 0.9 %
10 SYRINGE (ML) INJECTION EVERY 12 HOURS SCHEDULED
Status: DISCONTINUED | OUTPATIENT
Start: 2021-06-20 | End: 2021-06-22 | Stop reason: HOSPADM

## 2021-06-20 RX ORDER — ALBUTEROL SULFATE 90 UG/1
2 AEROSOL, METERED RESPIRATORY (INHALATION) EVERY 4 HOURS PRN
Status: DISCONTINUED | OUTPATIENT
Start: 2021-06-20 | End: 2021-06-20 | Stop reason: ALTCHOICE

## 2021-06-20 RX ORDER — ATORVASTATIN CALCIUM 40 MG/1
80 TABLET, FILM COATED ORAL NIGHTLY
Status: DISCONTINUED | OUTPATIENT
Start: 2021-06-20 | End: 2021-06-22 | Stop reason: HOSPADM

## 2021-06-20 RX ORDER — LORAZEPAM 0.5 MG/1
0.5 TABLET ORAL EVERY 8 HOURS PRN
Status: DISCONTINUED | OUTPATIENT
Start: 2021-06-20 | End: 2021-06-22 | Stop reason: HOSPADM

## 2021-06-20 RX ORDER — ONDANSETRON 4 MG/1
4 TABLET, ORALLY DISINTEGRATING ORAL EVERY 8 HOURS PRN
Status: DISCONTINUED | OUTPATIENT
Start: 2021-06-20 | End: 2021-06-22 | Stop reason: HOSPADM

## 2021-06-20 RX ORDER — ASPIRIN 325 MG
325 TABLET, DELAYED RELEASE (ENTERIC COATED) ORAL ONCE
Status: COMPLETED | OUTPATIENT
Start: 2021-06-20 | End: 2021-06-20

## 2021-06-20 RX ORDER — ASPIRIN 300 MG/1
300 SUPPOSITORY RECTAL DAILY
Status: DISCONTINUED | OUTPATIENT
Start: 2021-06-20 | End: 2021-06-22 | Stop reason: HOSPADM

## 2021-06-20 RX ORDER — MONTELUKAST SODIUM 10 MG/1
10 TABLET ORAL NIGHTLY
COMMUNITY
End: 2022-02-09 | Stop reason: SDUPTHER

## 2021-06-20 RX ORDER — ONDANSETRON 2 MG/ML
4 INJECTION INTRAMUSCULAR; INTRAVENOUS ONCE
Status: DISCONTINUED | OUTPATIENT
Start: 2021-06-20 | End: 2021-06-20

## 2021-06-20 RX ADMIN — FLUTICASONE PROPIONATE 1 SPRAY: 50 SPRAY, METERED NASAL at 09:57

## 2021-06-20 RX ADMIN — ATORVASTATIN CALCIUM 80 MG: 40 TABLET, FILM COATED ORAL at 21:15

## 2021-06-20 RX ADMIN — SODIUM CHLORIDE 125 ML/HR: 900 INJECTION, SOLUTION INTRAVENOUS at 13:56

## 2021-06-20 RX ADMIN — IOPAMIDOL 90 ML: 755 INJECTION, SOLUTION INTRAVENOUS at 04:05

## 2021-06-20 RX ADMIN — LOSARTAN POTASSIUM 50 MG: 50 TABLET, FILM COATED ORAL at 13:55

## 2021-06-20 RX ADMIN — SODIUM CHLORIDE, PRESERVATIVE FREE 10 ML: 5 INJECTION INTRAVENOUS at 09:57

## 2021-06-20 RX ADMIN — SODIUM CHLORIDE 125 ML/HR: 900 INJECTION, SOLUTION INTRAVENOUS at 10:04

## 2021-06-20 RX ADMIN — ASPIRIN 325 MG: 325 TABLET ORAL at 10:04

## 2021-06-20 RX ADMIN — ASPIRIN 325 MG: 325 TABLET, COATED ORAL at 04:54

## 2021-06-20 NOTE — PROGRESS NOTES
Progress Note  Baltazar Santos MD  Hospitalist    Date of visit: 6/20/2021     LOS: 0 days   Patient Care Team:  Provider, No Known as PCP - General    Chief Complaint: L sided back and leg pain    Subjective     Interval History:     Patient Complaints: L sided back and leg pain / occasional numbness.    History taken from: patient    Medication Review:   Current Facility-Administered Medications   Medication Dose Route Frequency Provider Last Rate Last Admin   • albuterol (PROVENTIL) nebulizer solution 0.083% 2.5 mg/3mL  2.5 mg Nebulization Q4H PRN Fernando Chance MD       • aspirin tablet 325 mg  325 mg Oral Daily Fernando Chance MD   325 mg at 06/20/21 1004    Or   • aspirin suppository 300 mg  300 mg Rectal Daily Fernando Chance MD       • atorvastatin (LIPITOR) tablet 80 mg  80 mg Oral Nightly Fernando Chance MD       • benzonatate (TESSALON) capsule 200 mg  200 mg Oral TID PRN Fernando Chance MD       • calcium carbonate (TUMS) chewable tablet 500 mg (200 mg elemental)  2 tablet Oral BID PRN Fernando Chance MD       • dicyclomine (BENTYL) tablet 20 mg  20 mg Oral Q6H PRN Fernando Chance MD       • enalaprilat (VASOTEC) injection 0.625 mg  0.625 mg Intravenous Q6H PRN Fernando Chance MD       • fluticasone (FLONASE) 50 MCG/ACT nasal spray 1 spray  1 spray Nasal Daily Fernando Chance MD   1 spray at 06/20/21 0957   • guaiFENesin-codeine (GUAIFENESIN AC) 100-10 MG/5ML liquid 10 mL  10 mL Oral Q6H PRN Fernando Chance MD       • LORazepam (ATIVAN) tablet 0.5 mg  0.5 mg Oral Q8H PRN Fernando Chance MD       • losartan (COZAAR) tablet 50 mg  50 mg Oral Q24H Baltazar Santos MD       • ondansetron ODT (ZOFRAN-ODT) disintegrating tablet 4 mg  4 mg Oral Q8H PRN Fernando Chance MD       • sodium chloride 0.9 % flush 10 mL  10 mL Intravenous PRN Jose De Jesus Harry MD       • sodium chloride 0.9 % flush 10 mL  10 mL Intravenous PRN Jose De Jesus Harry MD       • sodium chloride 0.9 % flush 10 mL  10 mL Intravenous Q12H  Fernando Chance MD   10 mL at 06/20/21 0957   • sodium chloride 0.9 % flush 10 mL  10 mL Intravenous PRN Fernando Chance MD       • sodium chloride 0.9 % infusion  125 mL/hr Intravenous Continuous Jose De Jesus Harry  mL/hr at 06/20/21 1004 125 mL/hr at 06/20/21 1004     Current Outpatient Medications   Medication Sig Dispense Refill   • albuterol sulfate HFA (Ventolin HFA) 108 (90 Base) MCG/ACT inhaler Inhale 2 puffs Every 4 (Four) Hours As Needed for Wheezing. 8 g 0   • benzonatate (TESSALON) 200 MG capsule Take 1 capsule by mouth 3 (Three) Times a Day As Needed for Cough. 30 capsule 0   • dicyclomine (BENTYL) 20 MG tablet Take 1 tablet by mouth Every 6 (Six) Hours As Needed (abd cramping). 24 tablet 0   • fluticasone (FLONASE) 50 MCG/ACT nasal spray 1 spray into the nostril(s) as directed by provider Daily. 1 bottle 0   • ondansetron ODT (ZOFRAN-ODT) 4 MG disintegrating tablet Place 1 tablet on the tongue Every 6 (Six) Hours As Needed for Nausea or Vomiting. 10 tablet 0   • promethazine-dextromethorphan (PROMETHAZINE-DM) 6.25-15 MG/5ML syrup Take 5 mL by mouth At Night As Needed for Cough. 120 mL 0       Review of Systems:   Review of Systems   Constitutional: Positive for fatigue. Negative for fever.   Respiratory: Negative for shortness of breath, wheezing and stridor.    Cardiovascular: Negative for chest pain and palpitations.   Gastrointestinal: Negative for abdominal distention, abdominal pain, blood in stool, diarrhea and vomiting.   Genitourinary: Negative for dysuria, frequency, genital sores and urgency.   Musculoskeletal: Positive for arthralgias and back pain.   Skin: Negative for color change, pallor and wound.   Neurological: Positive for weakness. Negative for seizures, syncope, facial asymmetry, speech difficulty and numbness.   Psychiatric/Behavioral: Negative for agitation and behavioral problems.   All other systems reviewed and are negative.      Objective     Vital Signs  Temp:  [97.6 °F  (36.4 °C)] 97.6 °F (36.4 °C)  Heart Rate:  [] 94  Resp:  [16-20] 16  BP: (126-178)/(60-96) 178/64    Physical Exam:  Physical Exam  Vitals reviewed.   Constitutional:       General: He is not in acute distress.     Appearance: He is ill-appearing.   HENT:      Head: Normocephalic and atraumatic.   Eyes:      General: No scleral icterus.     Extraocular Movements: Extraocular movements intact.      Pupils: Pupils are equal, round, and reactive to light.   Cardiovascular:      Rate and Rhythm: Normal rate and regular rhythm.   Pulmonary:      Effort: No respiratory distress.      Breath sounds: Normal breath sounds. No stridor. No wheezing or rales.   Chest:      Chest wall: No tenderness.   Abdominal:      General: Bowel sounds are normal. There is no distension.      Palpations: Abdomen is soft. There is no mass.      Tenderness: There is no abdominal tenderness. There is no right CVA tenderness or left CVA tenderness.   Musculoskeletal:         General: No swelling or deformity.      Cervical back: Normal range of motion and neck supple. No rigidity or tenderness.      Left lower leg: No edema.   Skin:     General: Skin is warm and dry.      Coloration: Skin is not jaundiced or pale.      Findings: No bruising or lesion.   Neurological:      General: No focal deficit present.      Mental Status: He is alert and oriented to person, place, and time. Mental status is at baseline.      Cranial Nerves: No cranial nerve deficit.      Sensory: No sensory deficit.      Motor: No weakness.   Psychiatric:         Behavior: Behavior normal.          Results Review:    Lab Results (last 24 hours)     Procedure Component Value Units Date/Time    Urine Drug Screen - Urine, Clean Catch [[id number]]  (Normal) Collected: 06/20/21 1057    Specimen: Urine, Clean Catch Updated: 06/20/21 1119     THC, Screen, Urine Negative     Phencyclidine (PCP), Urine Negative     Cocaine Screen, Urine Negative     Methamphetamine, Ur Negative      Opiate Screen Negative     Amphetamine Screen, Urine Negative     Benzodiazepine Screen, Urine Negative     Tricyclic Antidepressants Screen Negative     Methadone Screen, Urine Negative     Barbiturates Screen, Urine Negative     Oxycodone Screen, Urine Negative     Propoxyphene Screen Negative     Buprenorphine, Screen, Urine Negative    Narrative:      Cutoff For Drugs Screened:    Amphetamines               500 ng/ml  Barbiturates               200 ng/ml  Benzodiazepines            150 ng/ml  Cocaine                    150 ng/ml  Methadone                  200 ng/ml  Opiates                    100 ng/ml  Phencyclidine               25 ng/ml  THC                            50 ng/ml  Methamphetamine            500 ng/ml  Tricyclic Antidepressants  300 ng/ml  Oxycodone                  100 ng/ml  Propoxyphene               300 ng/ml  Buprenorphine               10 ng/ml    The normal value for all drugs tested is negative. This report includes unconfirmed screening results, with the cutoff values listed, to be used for medical treatment purposes only.  Unconfirmed results must not be used for non-medical purposes such as employment or legal testing.  Clinical consideration should be applied to any drug of abuse test, particularly when unconfirmed results are used.      Sedimentation Rate [[id number]]  (Normal) Collected: 06/20/21 0955    Specimen: Blood Updated: 06/20/21 1101     Sed Rate 15 mm/hr     TSH [[id number]]  (Normal) Collected: 06/20/21 0955    Specimen: Blood Updated: 06/20/21 1023     TSH 2.310 uIU/mL     Lipid Panel [[id number]]  (Abnormal) Collected: 06/20/21 0955    Specimen: Blood Updated: 06/20/21 1016     Total Cholesterol 170 mg/dL      Triglycerides 246 mg/dL      HDL Cholesterol 35 mg/dL      LDL Cholesterol  93 mg/dL      VLDL Cholesterol 42 mg/dL      LDL/HDL Ratio 2.45    Narrative:      Cholesterol Reference Ranges  (U.S. Department of Health and Human Services ATP III  Classifications)    Desirable          <200 mg/dL  Borderline High    200-239 mg/dL  High Risk          >240 mg/dL      Triglyceride Reference Ranges  (U.S. Department of Health and Human Services ATP III Classifications)    Normal           <150 mg/dL  Borderline High  150-199 mg/dL  High             200-499 mg/dL  Very High        >500 mg/dL    HDL Reference Ranges  (U.S. Department of Health and Human Services ATP III Classifcations)    Low     <40 mg/dl (major risk factor for CHD)  High    >60 mg/dl ('negative' risk factor for CHD)        LDL Reference Ranges  (U.S. Department of Health and Human Services ATP III Classifcations)    Optimal          <100 mg/dL  Near Optimal     100-129 mg/dL  Borderline High  130-159 mg/dL  High             160-189 mg/dL  Very High        >189 mg/dL    Uric Acid [[id number]]  (Normal) Collected: 06/20/21 0955    Specimen: Blood Updated: 06/20/21 1016     Uric Acid 4.2 mg/dL     Hemoglobin A1c [[id number]]  (Abnormal) Collected: 06/20/21 0955    Specimen: Blood Updated: 06/20/21 1012     Hemoglobin A1C 7.20 %     Narrative:      Hemoglobin A1C Ranges:    Increased Risk for Diabetes  5.7% to 6.4%  Diabetes                     >= 6.5%  Diabetic Goal                < 7.0%    Vitamin B12 [[id number]] Collected: 06/20/21 0955    Specimen: Blood Updated: 06/20/21 0958    aPTT [[id number]]  (Normal) Collected: 06/20/21 0354    Specimen: Blood Updated: 06/20/21 0422     PTT 27.5 seconds     Narrative:      The recommended Heparin therapeutic range is 68-97 seconds.    Protime-INR [[id number]]  (Normal) Collected: 06/20/21 0354    Specimen: Blood Updated: 06/20/21 0422     Protime 13.3 Seconds      INR 1.02    Narrative:      Therapeutic range for most indications is 2.0-3.0 INR,  or 2.5-3.5 for mechanical heart valves.    COVID-19 and FLU A/B PCR - Swab, Nasopharynx [[id number]]  (Normal) Collected: 06/20/21 0354    Specimen: Swab from Nasopharynx Updated: 06/20/21 0421     COVID19 Not Detected      Influenza A PCR Not Detected     Influenza B PCR Not Detected    Narrative:      Fact sheet for providers: https://www.fda.gov/media/115874/download    Fact sheet for patients: https://www.fda.gov/media/887490/download    Test performed by PCR.    Troponin [[id number]]  (Normal) Collected: 06/20/21 0159    Specimen: Blood Updated: 06/20/21 0349     Troponin T <0.010 ng/mL     Narrative:      Troponin T Reference Range:  <= 0.03 ng/mL-   Negative for AMI  >0.03 ng/mL-     Abnormal for myocardial necrosis.  Clinicians would have to utilize clinical acumen, EKG, Troponin and serial changes to determine if it is an Acute Myocardial Infarction or myocardial injury due to an underlying chronic condition.       Results may be falsely decreased if patient taking Biotin.      [place] Draw [[id number]] Collected: 06/20/21 0158    Specimen: Blood Updated: 06/20/21 0300    Narrative:      The following orders were created for panel order [place] Draw.  Procedure                               Abnormality         Status                     ---------                               -----------         ------                     Green Top (Gel)[[id number]]                                  Final result               Lavender Top[[id number]]                                     Final result               Gold Top - SST[[id number]]                                   Final result                 Please view results for these tests on the individual orders.    Lavender Top [[id number]] Collected: 06/20/21 0159    Specimen: Blood Updated: 06/20/21 0300     Extra Tube hold for add-on     Comment: Auto resulted       Green Top (Gel) [[id number]] Collected: 06/20/21 0159    Specimen: Blood Updated: 06/20/21 0300     Extra Tube Hold for add-ons.     Comment: Auto resulted.       Gold Top - SST [[id number]] Collected: 06/20/21 0158    Specimen: Blood Updated: 06/20/21 0300     Extra Tube Hold for add-ons.     Comment: Auto resulted.        Comprehensive Metabolic Panel [[id number]]  (Abnormal) Collected: 06/20/21 0159    Specimen: Blood Updated: 06/20/21 0227     Glucose 192 mg/dL      BUN 10 mg/dL      Creatinine 0.68 mg/dL      Sodium 138 mmol/L      Potassium 3.7 mmol/L      Chloride 102 mmol/L      CO2 27.0 mmol/L      Calcium 9.1 mg/dL      Total Protein 6.4 g/dL      Albumin 3.80 g/dL      ALT (SGPT) 38 U/L      AST (SGOT) 22 U/L      Alkaline Phosphatase 82 U/L      Total Bilirubin 0.6 mg/dL      eGFR Non African Amer 141 mL/min/1.73      Globulin 2.6 gm/dL      A/G Ratio 1.5 g/dL      BUN/Creatinine Ratio 14.7     Anion Gap 9.0 mmol/L     Narrative:      GFR Normal >60  Chronic Kidney Disease <60  Kidney Failure <15      CBC & Differential [[id number]]  (Abnormal) Collected: 06/20/21 0159    Specimen: Blood Updated: 06/20/21 0203    Narrative:      The following orders were created for panel order CBC & Differential.  Procedure                               Abnormality         Status                     ---------                               -----------         ------                     CBC Auto Differential[[id number]]        Abnormal            Final result                 Please view results for these tests on the individual orders.    CBC Auto Differential [[id number]]  (Abnormal) Collected: 06/20/21 0159    Specimen: Blood Updated: 06/20/21 0203     WBC 10.20 10*3/mm3      RBC 4.62 10*6/mm3      Hemoglobin 12.6 g/dL      Hematocrit 39.2 %      MCV 84.8 fL      MCH 27.3 pg      MCHC 32.1 g/dL      RDW 14.3 %      RDW-SD 43.6 fl      MPV 9.7 fL      Platelets 304 10*3/mm3      Neutrophil % 59.8 %      Lymphocyte % 29.0 %      Monocyte % 5.8 %      Eosinophil % 4.6 %      Basophil % 0.4 %      Immature Grans % 0.4 %      Neutrophils, Absolute 6.10 10*3/mm3      Lymphocytes, Absolute 2.96 10*3/mm3      Monocytes, Absolute 0.59 10*3/mm3      Eosinophils, Absolute 0.47 10*3/mm3      Basophils, Absolute 0.04 10*3/mm3      Immature Grans, Absolute  0.04 10*3/mm3      nRBC 0.0 /100 WBC           Imaging Results (Last 24 Hours)     Procedure Component Value Units Date/Time    CT Angiogram Head [[id number]] Collected: 06/20/21 0334     Updated: 06/20/21 0643    Narrative:      CLINICAL HISTORY:  cva    EXAMINATION:  CT ANGIOGRAM HEAD    COMPARISON:  No comparison    CONTRAST:  Images were obtained post IV contrast administration.    TECHNIQUE:  Thin section helical images were obtained during the arterial  phase. 3D post processed reconstructed images of the vasculature  was performed and reviewed. The stenosis measurements were  performed utilizing the standard established by the [place]  American Symptomatic Carotid Endarterectomy Trial (NASCET). The  internal carotid artery luminal narrowing compared to the distal  internal carotid artery lumen as defined by an NASCET criteria.  Sagittal and coronal reformatted images were obtained from  helical CT data acquisition.     The following CT was done with automated exposure control. The mA  and KVP were adjusted to obtain quality images and lower patient  dose according to patient's size.    FINDINGS:  CTA NECK  The resolution and image quality within the neck vasculature is  limited by quantum mottle artifact related to the body habitus of  the patient. This is particularly the case of the inferior common  carotid and vertebral arteries. There is no evidence of  significant atherosclerotic change or stenosis within the common  carotid or internal carotid arteries bilaterally. There is a  dominant left vertebral artery with a significantly small  caliber, developmentally hypoplastic right vertebral artery.  Proximally the right vertebral artery cannot be accurately  evaluated given the aforementioned limitations in resolution. The  visualized mid and distal portions of the right vertebral artery  which are small in caliber demonstrate patency.    Mildly enlarged level 2A jugular chain lymph nodes measuring up  to 17  mm are most likely reactive in this age.    CTA HEAD  [place] vessels are normal in configuration and caliber.  There is no evidence of vessel occlusion. There is no significant  focal stenosis identified. No aneurysm is detected.        Impression:      Study limited by factors related to quantum mottle artifact  associated with the body habitus of the patient. No evidence of  significant atherosclerotic change or stenosis within the neck or  [place] vasculature. 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 The proximal segment of the  right vertebral artery cannot be accurately assessed given the  limitations of the study.      Electronically signed by:  Jose J Moran DO  6/20/2021 5:40 AM CDT  Workstation: [id number]    CT Angiogram Carotids [[id number]] Collected: 06/20/21 0334     Updated: 06/20/21 0643    Narrative:      CLINICAL HISTORY:  cva    EXAMINATION:  CT ANGIOGRAM HEAD    COMPARISON:  No comparison    CONTRAST:  Images were obtained post IV contrast administration.    TECHNIQUE:  Thin section helical images were obtained during the arterial  phase. 3D post processed reconstructed images of the vasculature  was performed and reviewed. The stenosis measurements were  performed utilizing the standard established by the North  American Symptomatic Carotid Endarterectomy Trial (NASCET). The  internal carotid artery luminal narrowing compared to the distal  internal carotid artery lumen as defined by an NASCET criteria.  Sagittal and coronal reformatted images were obtained from  helical CT data acquisition.     The following CT was done with automated exposure control. The mA  and KVP were adjusted to obtain quality images and lower patient  dose according to patient's size.    FINDINGS:  CTA NECK  The resolution and image quality within the neck vasculature is  limited by quantum mottle artifact related to the body habitus of  the patient. This is particularly the  case of the inferior common  carotid and vertebral arteries. There is no evidence of  significant atherosclerotic change or stenosis within the common  carotid or internal carotid arteries bilaterally. There is a  dominant left vertebral artery with a significantly small  caliber, developmentally hypoplastic right vertebral artery.  Proximally the right vertebral artery cannot be accurately  evaluated given the aforementioned limitations in resolution. The  visualized mid and distal portions of the right vertebral artery  which are small in caliber demonstrate patency.    Mildly enlarged level 2A jugular chain lymph nodes measuring up  to 17 mm are most likely reactive in this age.    CTA HEAD  [place] vessels are normal in configuration and caliber.  There is no evidence of vessel occlusion. There is no significant  focal stenosis identified. No aneurysm is detected.        Impression:      Study limited by factors related to quantum mottle artifact  associated with the body habitus of the patient. No evidence of  significant atherosclerotic change or stenosis within the neck or  [place] vasculature. 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 The proximal segment of the  right vertebral artery cannot be accurately assessed given the  limitations of the study.      Electronically signed by:  Jose J Moran DO  6/20/2021 5:40 AM CDT  Workstation: [id number]    XR Chest 1 View [[id number]] Collected: 06/20/21 0416     Updated: 06/20/21 0540    Narrative:      EXAM DESCRIPTION:  XR CHEST 1 VW  [facility]: XR CHEST 1 VIEW     CLINICAL HISTORY:  25 years Male, cva    COMPARISON: Chest x-ray 3/3/2021.    FINDINGS:  Heart size and pulmonary vascularity appear within normal limits.  No pneumothorax is identified. No focal consolidation is seen.      Impression:      1.  No acute cardiopulmonary process.    Electronically signed by:  Erich Rodriguez DO  6/20/2021 5:39 AM CDT  Workstation:  [id number]    CT Head Without Contrast [[id number]] Collected: 06/20/21 0331     Updated: 06/20/21 0515    Narrative:      CLINICAL HISTORY:  cva    EXAMINATION:  CT HEAD WO CONTRAST    COMPARISON:  10/19/2020    FINDINGS:   Contiguous 5 mm unenhanced axial images were obtained through the  brain. The following CT was done with automated exposure control.  The mA and KVP were adjusted to obtain quality images and lower  patient dose according to patient's size.    The ventricles and sulci are within normal limits in size and  appearance for age. There is no evidence of bleeding, mass  lesion, or midline shift. There is no extra-axial fluid  collection identified. There is no evidence to suggest an acute  large territory infarct. There is no significant focal  abnormality identified within the brain parenchyma. There is no  acute abnormality identified within the brain.      Impression:      Brain within normal limits in appearance for age.    Electronically signed by:  Jose J Moran DO  6/20/2021 5:14 AM CDT  Workstation: [id number]          Assessment/Plan       Left-sided back pain    Hypertension    Morbid obesity (CMS/HCC)    Diabetes mellitus (CMS/HCC)    Continue with the symptomatic pain relief, blood and glucose control, Aspirin, Lipitor, PT/OT as tolerated.    The head and neck CT / CTA were negative. The brain MRI can not be performed as the patient's weight and girth exceed the design limits of the available machines. He can be transferred to a regular floor as discussed with the Neurologist on call.    I confirmed that the patient's Advance Care Plan is present, code status is documented, or surrogate decision maker is listed in the patient's medical record.      Baltazar Santos MD  06/20/21  12:33 CDT

## 2021-06-20 NOTE — ED PROVIDER NOTES
Subjective   26yo male pmh significant morbid obesity presents ED c/o 2d hx left sided numbness with LUE/LLE motor weakness.  ROS (+) headache.  Denies visual disturbance/dysarthria/dysphagia/fever/cough/soa/abd pain/chest pain.  [facility] and evaluated for chest pain.      History provided by:  Patient  Neurologic Problem  The patient's primary symptoms include focal sensory loss, focal weakness and weakness. Associated symptoms include headaches. Pertinent negatives include no fever.       Review of Systems   Constitutional: Negative for fever.   Respiratory: Negative.    Cardiovascular: Negative.    Gastrointestinal: Negative.    Genitourinary: Negative.    Neurological: Positive for focal weakness, weakness, numbness and headaches. Negative for facial asymmetry.   All other systems reviewed and are negative.      Past Medical History:   Diagnosis Date   • Arthritis    • Asthma    • Carpal tunnel syndrome    • Depression    • Hypertension    • PTSD (post-traumatic stress disorder)        Allergies   Allergen Reactions   • Amoxicillin Rash   • Penicillins Rash     redness       Past Surgical History:   Procedure Laterality Date   • ADENOIDECTOMY     • APPENDECTOMY     • CHOLECYSTECTOMY     • TONSILLECTOMY         History reviewed. No pertinent family history.    Social History     Socioeconomic History   • Marital status: Single     Spouse name: Not on file   • Number of children: Not on file   • Years of education: Not on file   • Highest education level: Not on file   Tobacco Use   • Smoking status: Former Smoker     Packs/day: 1.00     Types: Cigarettes   • Smokeless tobacco: Current User     Types: Chew   Substance and Sexual Activity   • Alcohol use: Not Currently   • Drug use: Never   • Sexual activity: Defer           Objective   Physical Exam  Vitals and nursing note reviewed.   Constitutional:       Appearance: Normal appearance. He is obese.   HENT:      Head:  Normocephalic and atraumatic.      Mouth/Throat:      Mouth: Mucous membranes are moist.   Eyes:      General: No visual field deficit.  Cardiovascular:      Rate and Rhythm: Normal rate and regular rhythm.      Pulses: Normal pulses.      Heart sounds: Normal heart sounds. No murmur heard.   No friction rub. No gallop.    Pulmonary:      Effort: Pulmonary effort is normal. No respiratory distress.      Breath sounds: Normal breath sounds. No wheezing, rhonchi or rales.   Abdominal:      General: Bowel sounds are normal. There is no distension.      Palpations: Abdomen is soft.      Tenderness: There is no abdominal tenderness. There is no guarding or rebound.   Musculoskeletal:         General: Normal range of motion.      Cervical back: Normal range of motion and neck supple. No rigidity.   Lymphadenopathy:      Cervical: No cervical adenopathy.   Skin:     General: Skin is warm and dry.   Neurological:      Mental Status: He is alert.      GCS: GCS eye subscore is 4. GCS verbal subscore is 5. GCS motor subscore is 6.      Cranial Nerves: Cranial nerves are intact.      Sensory: Sensory deficit present.      Motor: Weakness present.      Coordination: Coordination is intact. Finger-Nose-Finger Test and Heel to Shin Test normal.      Comments: Motor: RUE 5/5; RLE 5/5; LUE 4/5; LLE 4/5  Sensation: impaired pinprick left face/LUE/LLE           ECG 12 Lead      Date/Time: 6/20/2021 5:25 AM  Performed by: Jose De Jesus Harry MD  Authorized by: Jose De Jesus Harry MD   Interpreted by physician  Rhythm: sinus rhythm  Rate: normal  BPM: 89  QRS axis: normal  Conduction: conduction normal  ST Segments: ST segments normal  T Waves: T waves normal  Other: no other findings  Clinical impression: normal ECG                 ED Course  ED Course as of Jun 20 0524   Sun Jun 20, 2021   0331 Dr. Mohsen consulted. [facility] neurology    [SD]      ED Course User Index  [SD] Jose De Jesus Harry MD      Labs Reviewed   COMPREHENSIVE METABOLIC PANEL -  Abnormal; Notable for the following components:       Result Value    Glucose 192 (*)     Creatinine 0.68 (*)     All other components within normal limits    Narrative:     GFR Normal >60  Chronic Kidney Disease <60  Kidney Failure <15     CBC WITH AUTO DIFFERENTIAL - Abnormal; Notable for the following components:    Hemoglobin 12.6 (*)     Eosinophils, Absolute 0.47 (*)     All other components within normal limits   COVID-19 AND FLU A/B, NP SWAB IN TRANSPORT MEDIA 8-12 HR TAT - Normal    Narrative:     Fact sheet for providers: https://www.fda.gov/media/905991/download    Fact sheet for patients: https://www.fda.gov/media/631183/download    Test performed by PCR.   PROTIME-INR - Normal    Narrative:     Therapeutic range for most indications is 2.0-3.0 INR,  or 2.5-3.5 for mechanical heart valves.   APTT - Normal    Narrative:     The recommended Heparin therapeutic range is 68-97 seconds.   TROPONIN (IN-HOUSE) - Normal    Narrative:     Troponin T Reference Range:  <= 0.03 ng/mL-   Negative for AMI  >0.03 ng/mL-     Abnormal for myocardial necrosis.  Clinicians would have to utilize clinical acumen, EKG, Troponin and serial changes to determine if it is an Acute Myocardial Infarction or myocardial injury due to an underlying chronic condition.       Results may be falsely decreased if patient taking Biotin.     RAINBOW DRAW    Narrative:     The following orders were created for panel order [place] Draw.  Procedure                               Abnormality         Status                     ---------                               -----------         ------                     Green Top (Gel)[[id number]]                                  Final result               Lavender Top[[id number]]                                     Final result               Gold Top - [facility][[id number]]                                   Final result                 Please view results for these tests on the individual orders.   POCT GLUCOSE  FINGERSTICK   CBC AND DIFFERENTIAL    Narrative:     The following orders were created for panel order CBC & Differential.  Procedure                               Abnormality         Status                     ---------                               -----------         ------                     CBC Auto Differential[[id number]]        Abnormal            Final result                 Please view results for these tests on the individual orders.   GREEN TOP   LAVENDER TOP   GOLD TOP - SST     CT Head Without Contrast    Result Date: 6/20/2021  Narrative: CLINICAL HISTORY: cva EXAMINATION: CT HEAD WO CONTRAST COMPARISON: 10/19/2020 FINDINGS: Contiguous 5 mm unenhanced axial images were obtained through the brain. The following CT was done with automated exposure control. The mA and KVP were adjusted to obtain quality images and lower patient dose according to patient's size. The ventricles and sulci are within normal limits in size and appearance for age. There is no evidence of bleeding, mass lesion, or midline shift. There is no extra-axial fluid collection identified. There is no evidence to suggest an acute large territory infarct. There is no significant focal abnormality identified within the brain parenchyma. There is no acute abnormality identified within the brain.     Impression: Brain within normal limits in appearance for age. Electronically signed by:  Jose J Moran DO  6/20/2021 5:14 AM CDT Workstation: [id number]    Interval: baseline  1a. Level of Consciousness: 0-->Alert, keenly responsive  1b. LOC Questions: 0-->Answers both questions correctly  1c. LOC Commands: 0-->Performs both tasks correctly  2. Best Gaze: 0-->Normal  3. Visual: 0-->No visual loss  4. Facial Palsy: 0-->Normal symmetrical movements  5a. Motor Arm, Left: 0-->No drift, limb holds 90 (or 45) degrees for full 10 secs  5b. Motor Arm, Right: 0-->No drift, limb holds 90 (or 45) degrees for full 10 secs  6a. Motor Leg, Left: 0-->No drift,  leg holds 30 degree position for full 5 secs  6b. Motor Leg, Right: 0-->No drift, leg holds 30 degree position for full 5 secs  7. Limb Ataxia: 0-->Absent  8. Sensory: 1-->Mild-to-moderate sensory loss, patient feels pinprick is less sharp or is dull on the affected side, or there is a loss of superficial pain with pinprick, but patient is aware of being touched  9. Best Language: 0-->No aphasia, normal  10. Dysarthria: 0-->Normal  11. Extinction and Inattention (formerly Neglect): 0-->No abnormality    Total (NIH Stroke Scale): 1                                     MDM    Final diagnoses:   Weakness   Numbness on left side       ED Disposition  ED Disposition     ED Disposition Condition Comment    Decision to Admit  Level of Care: Stepdown [25]   Admitting Physician: WHITLEY VILLEGAS [[id number]]   Attending Physician: WHITLEY VILLEGAS [[id number]]   Patient Class: Observation [104]            No follow-up provider specified.       Medication List      No changes were made to your prescriptions during this visit.          Jose De Jesus Harry MD  06/20/21 0524       Jose De Jesus Harry MD  06/20/21 0525

## 2021-06-20 NOTE — CONSULTS
con  Stroke Consult Note    Patient Name: Venkatesh Parra   MRN: [id number]  Age: 25 y.o.  Sex: male  : 1995    Primary Care Physician: Provider, No Known  Referring Physician:  Jose De Jesus Harry MD    Chief Complaint/Reason for Consultation: Left-sided weakness    Subjective:  History obtained from patient and from medical staff and from family members at bedside.  No previous history of recent stroke or transient ischemic attack, no history of seizures or passing out. No history of focal weakness suggesting relapsing remitting MS.  Patient taken no anticoagulation therapy or antiplatelet therapy.  No history of head trauma or meningitis or strong family history of epilepsy.  No strong family history of aneurysm. No recent change in patient daily routine.  No recent new medication her symptoms started unprovoked.  Last normal unknown, exact baseline unknown, symptoms started more than 24 hours ago. Left-sided arm and leg and face numbness and weakness.    Past Medical History:   Diagnosis Date   • Asthma    • Carpal tunnel syndrome    • Depression    • Diabetes mellitus (CMS/HCC)    • Hypertension    • Morbid obesity (CMS/HCC)    • Osteoarthritis    • PTSD (post-traumatic stress disorder)      Past Surgical History:   Procedure Laterality Date   • CHOLECYSTECTOMY       Family History   Problem Relation Age of Onset   • Hypertension Father      Social History     Socioeconomic History   • Marital status: Single     Spouse name: Not on file   • Number of children: Not on file   • Years of education: Not on file   • Highest education level: Not on file   Tobacco Use   • Smoking status: Former Smoker     Packs/day: 1.00     Types: Cigarettes   • Smokeless tobacco: Current User     Types: Chew   Substance and Sexual Activity   • Alcohol use: Not Currently   • Drug use: Never   • Sexual activity: Not Currently       Allergies   Allergen Reactions   • Amoxicillin Rash   • Penicillins Rash     redness     Prior to  Admission medications    Medication Sig Start Date End Date Taking? Authorizing Provider   albuterol sulfate HFA (Ventolin HFA) 108 (90 Base) MCG/ACT inhaler Inhale 2 puffs Every 4 (Four) Hours As Needed for Wheezing. 12/22/20   Jada Ross APRN   benzonatate (TESSALON) 200 MG capsule Take 1 capsule by mouth 3 (Three) Times a Day As Needed for Cough. 12/22/20   Jada Ross APRN   dicyclomine (BENTYL) 20 MG tablet Take 1 tablet by mouth Every 6 (Six) Hours As Needed (abd cramping). 4/29/21   Consuelo Mcneal MD   fluticasone (FLONASE) 50 MCG/ACT nasal spray 1 spray into the nostril(s) as directed by provider Daily. 12/22/20   Jada Ross APRN   ondansetron ODT (ZOFRAN-ODT) 4 MG disintegrating tablet Place 1 tablet on the tongue Every 6 (Six) Hours As Needed for Nausea or Vomiting. 3/3/21   Kei Arango MD   promethazine-dextromethorphan (PROMETHAZINE-DM) 6.25-15 MG/5ML syrup Take 5 mL by mouth At Night As Needed for Cough. 12/22/20   Jada Ross APRN       Objective:    Temp:  [97.6 °F (36.4 °C)] 97.6 °F (36.4 °C)  Heart Rate:  [] 74  Resp:  [16-20] 16  BP: (126-161)/(60-96) 136/78    Neuro Exam:   Drowsy and follow multi step command, Track objects all directions. No visual filed cut .Bilateral ptosis Pupils equal and reactive. No clear facial droop. No nystagmus. Normal language fluency, repetition and comprehension. Power: Move all extremities, Left drift, leg lags.  Did good finger nose finger. In the left side abnormal sensory examination. Didn't evaluate gait at this time.    NIH Stroke Scale 3  Time: 09:05 CDT  Person Administering Scale: Bashar A Mohsen, MD    1a  Level of consciousness: 0=alert; keenly responsive   1b. LOC questions:  0=Performs both tasks correctly   1c. LOC commands: 0=Performs both tasks correctly   2.  Best Gaze: 0=normal   3.  Visual: 0=No visual loss   4. Facial Palsy: 0=Normal symmetric movement   5a.  Motor left arm: 1=Drift, limb holds 90 (or 45)  degrees but drifts down before full 10 seconds: does not hit bed   5b.  Motor right arm: 0=No drift, limb holds 90 (or 45) degrees for full 10 seconds   6a. motor left le=Drift, limb holds 90 (or 45) degrees but drifts down before full 10 seconds: does not hit bed   6b  Motor right le=No drift, limb holds 90 (or 45) degrees for full 10 seconds   7. Limb Ataxia: 0=Absent   8.  Sensory: 1=Mild to moderate sensory loss; patient feels pinprick is less sharp or is dull on the affected side; there is a loss of superficial pain with pinprick but patient is aware He is being touched   9. Best Language:  0=No aphasia, normal   10. Dysarthria: 0=Normal   11. Extinction and Inattention: 0=No abnormality    Total:   3       This was an audio and video enabled telemedicine encounter.         Hospital Meds:  Scheduled- aspirin, 325 mg, Oral, Daily   Or  aspirin, 300 mg, Rectal, Daily  atorvastatin, 80 mg, Oral, Nightly  fluticasone, 1 spray, Nasal, Daily  sodium chloride, 10 mL, Intravenous, Q12H      Infusions- sodium chloride, 125 mL/hr       PRNs- •  albuterol  •  benzonatate  •  calcium carbonate  •  dicyclomine  •  enalaprilat  •  guaiFENesin-codeine  •  LORazepam  •  ondansetron ODT  •  [COMPLETED] Insert peripheral IV **AND** sodium chloride  •  [COMPLETED] Insert peripheral IV **AND** sodium chloride  •  sodium chloride    Results Reviewed:  I have personally reviewed current lab, radiology, and data and agree with results.    Radiology Results  Results for orders placed during the hospital encounter of 20    Adult Transthoracic Echo Complete W/ Cont if Necessary Per Protocol    Interpretation Summary  · Left ventricular ejection fraction appears to be 56 - 60%.This was done with definity .  · Overall study otherwise was subootimal o assess structures with certainity.    Results for orders placed during the hospital encounter of 21    CT Angiogram Head    Narrative  CLINICAL  HISTORY:  cva    EXAMINATION:  CT ANGIOGRAM HEAD    COMPARISON:  No comparison    CONTRAST:  Images were obtained post IV contrast administration.    TECHNIQUE:  Thin section helical images were obtained during the arterial  phase. 3D post processed reconstructed images of the vasculature  was performed and reviewed. The stenosis measurements were  performed utilizing the standard established by the [place]  American Symptomatic Carotid Endarterectomy Trial (NASCET). The  internal carotid artery luminal narrowing compared to the distal  internal carotid artery lumen as defined by an NASCET criteria.  Sagittal and coronal reformatted images were obtained from  helical CT data acquisition.    The following CT was done with automated exposure control. The mA  and KVP were adjusted to obtain quality images and lower patient  dose according to patient's size.    FINDINGS:  CTA NECK  The resolution and image quality within the neck vasculature is  limited by quantum mottle artifact related to the body habitus of  the patient. This is particularly the case of the inferior common  carotid and vertebral arteries. There is no evidence of  significant atherosclerotic change or stenosis within the common  carotid or internal carotid arteries bilaterally. There is a  dominant left vertebral artery with a significantly small  caliber, developmentally hypoplastic right vertebral artery.  Proximally the right vertebral artery cannot be accurately  evaluated given the aforementioned limitations in resolution. The  visualized mid and distal portions of the right vertebral artery  which are small in caliber demonstrate patency.    Mildly enlarged level 2A jugular chain lymph nodes measuring up  to 17 mm are most likely reactive in this age.    CTA HEAD  [place] vessels are normal in configuration and caliber.  There is no evidence of vessel occlusion. There is no significant  focal stenosis identified. No aneurysm is  detected.    Impression  Study limited by factors related to quantum mottle artifact  associated with the body habitus of the patient. No evidence of  significant atherosclerotic change or stenosis within the neck or  [place] vasculature. 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 The proximal segment of the  right vertebral artery cannot be accurately assessed given the  limitations of the study.      Electronically signed by:  Jose J Ottitzel CALIXTO  6/20/2021 5:40 AM CDT  Workstation: [id number]        Lab Results  No results found for: CHOL, HDL, LDLDIRECT, LDL, TRIG  Results from last 7 days   Lab Units 06/20/21  0159 06/17/21  1915   WBC 10*3/mm3 10.20 10.1   HEMOGLOBIN g/dL 12.6* 13.2   MCV fL 84.8 88.0   PLATELETS 10*3/mm3 304 318   NEUTROPHIL % % 59.8 63.4   LYMPHOCYTE % % 29.0 25.3   EOSINOPHIL % % 4.6 4.0*   IMM GRAN % % 0.4 0.6*   NEUTROS ABS 10*3/mm3 6.10 6.4   LYMPHS ABS 10*3/mm3 2.96 2.6   EOS ABS 10*3/mm3 0.47* 0.4*   IMMATURE GRANS (ABS) 10*3/mm3 0.04 0.06*     Results from last 7 days   Lab Units 06/20/21  0159   SODIUM mmol/L 138   POTASSIUM mmol/L 3.7   CHLORIDE mmol/L 102   CO2 mmol/L 27.0   BUN mg/dL 10   CREATININE mg/dL 0.68*   GLUCOSE mg/dL 192*   CALCIUM mg/dL 9.1       Assessment:  Left-sided weakness demarcating disorder versus ischemic stroke    Plan:  1. MRI of the brain With and without contrast and  CTA  brain and neck.  2. Echocardiogram.  3. Hemoglobin A-1 C and lipid profile.  4. Physical therapy occupational therapy and speech therapy.  6. Aspirin 325 mg daily and Lipitor 40 mg daily, after initial head CT show no bleed.  7. GI and DVT prophylaxis.  8. Stat head CT for any neurological changes.  9. Frequent [place] checks every 4 to 6 hours and vital sign check.  10. Stroke Risk  factor modification to the primary care team(HTN,DM).  11. All patient questions and concerns answered in detail.  12.Permessive H no     NIH Stroke Scale 3  Time: 09:05 CDT  Person  Administering Scale: Bashar A Mohsen, MD        This was an audio and video enabled telemedicine encounter.       Electronically signed by Bashar A Mohsen, MD, 06/20/21, 9:05 AM CDT.

## 2021-06-20 NOTE — SIGNIFICANT NOTE
06/20/21 1055   OTHER   Discipline occupational therapist;physical therapist   Rehab Time/Intention   Session Not Performed patient unavailable for evaluation  (Therapy will follow up as pt becomes more appropriate)   Recommendation   PT - Next Appointment 06/21/21   Recommendation   OT - Next Appointment 06/21/21

## 2021-06-20 NOTE — H&P
[facility] Medicine Services  INPATIENT HISTORY AND PHYSICAL       Patient Care Team:  Provider, No Known as PCP - General    Chief complaint   Chief Complaint   Patient presents with   • Numbness   • Weakness - Generalized       Subjective     Patient is a 25 y.o. male who is morbidly obese with past medical history of asthma, hypertension, PTSD, history of carpal tunnel syndrome, arthritis and obstructive sleep apnea.  He gives a history of on Thursday while walking at the presents he developed left-sided numbness in both his upper and lower extremities that was persistent for which EMS was called and he was taken to [facility] for evaluation.  He states that at [facility]  felt he was having cardiac issues and was evaluated for chest pain and subsequently released.  He states that he had the symptoms appear again today with it persisting for which she came to the emergency room for further evaluation.  Has had a negative CT scan of the brain neurologist recommends admission for continued evaluation and treatment.      Review of Systems   Constitutional: Positive for activity change. Negative for unexpected weight change.   HENT: Negative.    Eyes: Negative.    Respiratory: Negative.    Cardiovascular: Negative.    Gastrointestinal: Negative.    Endocrine: Negative.    Genitourinary: Negative.    Musculoskeletal: Negative.    Skin: Negative.    Neurological: Positive for weakness and numbness. Negative for facial asymmetry and speech difficulty.   Psychiatric/Behavioral: Negative.          History  Past Medical History:   Diagnosis Date   • Arthritis    • Asthma    • Carpal tunnel syndrome    • Depression    • Hypertension    • PTSD (post-traumatic stress disorder)      Past Surgical History:   Procedure Laterality Date   • ADENOIDECTOMY     • APPENDECTOMY     • CHOLECYSTECTOMY     • TONSILLECTOMY       History reviewed. No pertinent family history.  Social  History     Tobacco Use   • Smoking status: Former Smoker     Packs/day: 1.00     Types: Cigarettes   • Smokeless tobacco: Current User     Types: Chew   Substance Use Topics   • Alcohol use: Not Currently   • Drug use: Never     Allergies:  Amoxicillin and Penicillins  Prior to Admission medications    Medication Sig Start Date End Date Taking? Authorizing Provider   albuterol sulfate HFA (Ventolin HFA) 108 (90 Base) MCG/ACT inhaler Inhale 2 puffs Every 4 (Four) Hours As Needed for Wheezing. 12/22/20   Jada Ross APRN   benzonatate (TESSALON) 200 MG capsule Take 1 capsule by mouth 3 (Three) Times a Day As Needed for Cough. 12/22/20   Jada Ross APRN   dicyclomine (BENTYL) 20 MG tablet Take 1 tablet by mouth Every 6 (Six) Hours As Needed (abd cramping). 4/29/21   Consuelo Mcneal MD   fluticasone (FLONASE) 50 MCG/ACT nasal spray 1 spray into the nostril(s) as directed by provider Daily. 12/22/20   Jada Ross APRN   ondansetron ODT (ZOFRAN-ODT) 4 MG disintegrating tablet Place 1 tablet on the tongue Every 6 (Six) Hours As Needed for Nausea or Vomiting. 3/3/21   Kei Arango MD   promethazine-dextromethorphan (PROMETHAZINE-DM) 6.25-15 MG/5ML syrup Take 5 mL by mouth At Night As Needed for Cough. 12/22/20   Jada Ross APRN       Objective        Vital Signs  Temp:  [97.6 °F (36.4 °C)] 97.6 °F (36.4 °C)  Heart Rate:  [] 86  Resp:  [16-20] 20  BP: (139-161)/(60-96) 148/60      Physical Exam  Constitutional:       General: He is not in acute distress.     Appearance: Normal appearance. He is obese. He is not ill-appearing or toxic-appearing.   HENT:      Head: Normocephalic and atraumatic.      Mouth/Throat:      Mouth: Mucous membranes are moist.   Eyes:      Conjunctiva/sclera: Conjunctivae normal.   Cardiovascular:      Rate and Rhythm: Normal rate and regular rhythm.      Heart sounds: Normal heart sounds.   Pulmonary:      Effort: Pulmonary effort is normal.      Breath sounds:  Normal breath sounds. No wheezing.   Abdominal:      General: Bowel sounds are normal.      Palpations: Abdomen is soft.      Tenderness: There is no abdominal tenderness.   Skin:     General: Skin is warm and dry.   Neurological:      General: No focal deficit present.      Mental Status: He is alert and oriented to person, place, and time.   Psychiatric:         Behavior: Behavior normal.           Results Review:   Lab Results (last 24 hours)     Procedure Component Value Units Date/Time    aPTT [[id number]]  (Normal) Collected: 06/20/21 0354    Specimen: Blood Updated: 06/20/21 0422     PTT 27.5 seconds     Narrative:      The recommended Heparin therapeutic range is 68-97 seconds.    Protime-INR [[id number]]  (Normal) Collected: 06/20/21 0354    Specimen: Blood Updated: 06/20/21 0422     Protime 13.3 Seconds      INR 1.02    Narrative:      Therapeutic range for most indications is 2.0-3.0 INR,  or 2.5-3.5 for mechanical heart valves.    COVID-19 and FLU A/B PCR - Swab, Nasopharynx [[id number]]  (Normal) Collected: 06/20/21 0354    Specimen: Swab from Nasopharynx Updated: 06/20/21 0421     COVID19 Not Detected     Influenza A PCR Not Detected     Influenza B PCR Not Detected    Narrative:      Fact sheet for providers: https://www.fda.gov/media/848750/download    Fact sheet for patients: https://www.fda.gov/media/238675/download    Test performed by PCR.    Troponin [[id number]]  (Normal) Collected: 06/20/21 0159    Specimen: Blood Updated: 06/20/21 0349     Troponin T <0.010 ng/mL     Narrative:      Troponin T Reference Range:  <= 0.03 ng/mL-   Negative for AMI  >0.03 ng/mL-     Abnormal for myocardial necrosis.  Clinicians would have to utilize clinical acumen, EKG, Troponin and serial changes to determine if it is an Acute Myocardial Infarction or myocardial injury due to an underlying chronic condition.       Results may be falsely decreased if patient taking Biotin.      [place] Draw [[id number]] Collected:  06/20/21 0158    Specimen: Blood Updated: 06/20/21 0300    Narrative:      The following orders were created for panel order [place] Draw.  Procedure                               Abnormality         Status                     ---------                               -----------         ------                     Green Top (Gel)[[id number]]                                  Final result               Lavender Top[[id number]]                                     Final result               Gold Top - SST[[id number]]                                   Final result                 Please view results for these tests on the individual orders.    Lavender Top [[id number]] Collected: 06/20/21 0159    Specimen: Blood Updated: 06/20/21 0300     Extra Tube hold for add-on     Comment: Auto resulted       Green Top (Gel) [[id number]] Collected: 06/20/21 0159    Specimen: Blood Updated: 06/20/21 0300     Extra Tube Hold for add-ons.     Comment: Auto resulted.       Gold Top - SST [[id number]] Collected: 06/20/21 0158    Specimen: Blood Updated: 06/20/21 0300     Extra Tube Hold for add-ons.     Comment: Auto resulted.       Comprehensive Metabolic Panel [[id number]]  (Abnormal) Collected: 06/20/21 0159    Specimen: Blood Updated: 06/20/21 0227     Glucose 192 mg/dL      BUN 10 mg/dL      Creatinine 0.68 mg/dL      Sodium 138 mmol/L      Potassium 3.7 mmol/L      Chloride 102 mmol/L      CO2 27.0 mmol/L      Calcium 9.1 mg/dL      Total Protein 6.4 g/dL      Albumin 3.80 g/dL      ALT (SGPT) 38 U/L      AST (SGOT) 22 U/L      Alkaline Phosphatase 82 U/L      Total Bilirubin 0.6 mg/dL      eGFR Non African Amer 141 mL/min/1.73      Globulin 2.6 gm/dL      A/G Ratio 1.5 g/dL      BUN/Creatinine Ratio 14.7     Anion Gap 9.0 mmol/L     Narrative:      GFR Normal >60  Chronic Kidney Disease <60  Kidney Failure <15      CBC & Differential [[id number]]  (Abnormal) Collected: 06/20/21 0159    Specimen: Blood Updated: 06/20/21 0203    Narrative:       The following orders were created for panel order CBC & Differential.  Procedure                               Abnormality         Status                     ---------                               -----------         ------                     CBC Auto Differential[[id number]]        Abnormal            Final result                 Please view results for these tests on the individual orders.    CBC Auto Differential [[id number]]  (Abnormal) Collected: 06/20/21 0159    Specimen: Blood Updated: 06/20/21 0203     WBC 10.20 10*3/mm3      RBC 4.62 10*6/mm3      Hemoglobin 12.6 g/dL      Hematocrit 39.2 %      MCV 84.8 fL      MCH 27.3 pg      MCHC 32.1 g/dL      RDW 14.3 %      RDW-SD 43.6 fl      MPV 9.7 fL      Platelets 304 10*3/mm3      Neutrophil % 59.8 %      Lymphocyte % 29.0 %      Monocyte % 5.8 %      Eosinophil % 4.6 %      Basophil % 0.4 %      Immature Grans % 0.4 %      Neutrophils, Absolute 6.10 10*3/mm3      Lymphocytes, Absolute 2.96 10*3/mm3      Monocytes, Absolute 0.59 10*3/mm3      Eosinophils, Absolute 0.47 10*3/mm3      Basophils, Absolute 0.04 10*3/mm3      Immature Grans, Absolute 0.04 10*3/mm3      nRBC 0.0 /100 WBC          Imaging Results (Last 24 Hours)     Procedure Component Value Units Date/Time    CT Angiogram Head [[id number]] Collected: 06/20/21 0334     Updated: 06/20/21 0541    Narrative:      CLINICAL HISTORY:  cva    EXAMINATION:  CT ANGIOGRAM HEAD    COMPARISON:  No comparison    CONTRAST:  Images were obtained post IV contrast administration.    TECHNIQUE:  Thin section helical images were obtained during the arterial  phase. 3D post processed reconstructed images of the vasculature  was performed and reviewed. The stenosis measurements were  performed utilizing the standard established by the North  American Symptomatic Carotid Endarterectomy Trial (NASCET). The  internal carotid artery luminal narrowing compared to the distal  internal carotid artery lumen as defined by an  NASCET criteria.  Sagittal and coronal reformatted images were obtained from  helical CT data acquisition.     The following CT was done with automated exposure control. The mA  and KVP were adjusted to obtain quality images and lower patient  dose according to patient's size.    FINDINGS:  CTA NECK  The resolution and image quality within the neck vasculature is  limited by quantum mottle artifact related to the body habitus of  the patient. This is particularly the case of the inferior common  carotid and vertebral arteries. There is no evidence of  significant atherosclerotic change or stenosis within the common  carotid or internal carotid arteries bilaterally. There is a  dominant left vertebral artery with a significantly small  caliber, developmentally hypoplastic right vertebral artery.  Proximally the right vertebral artery cannot be accurately  evaluated given the aforementioned limitations in resolution. The  visualized mid and distal portions of the right vertebral artery  which are small in caliber demonstrate patency.    Mildly enlarged level 2A jugular chain lymph nodes measuring up  to 17 mm are most likely reactive in this age.    CTA HEAD  [place] vessels are normal in configuration and caliber.  There is no evidence of vessel occlusion. There is no significant  focal stenosis identified. No aneurysm is detected.        Impression:      Study limited by factors related to quantum mottle artifact  associated with the body habitus of the patient. No evidence of  significant atherosclerotic change or stenosis within the neck or  [place] vasculature. 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 The proximal segment of the  right vertebral artery cannot be accurately assessed given the  limitations of the study.      Electronically signed by:  Jose J Moran DO  6/20/2021 5:40 AM CDT  Workstation: [id number]    XR Chest 1 View [[id number]] Collected: 06/20/21 1452      Updated: 06/20/21 0540    Narrative:      EXAM DESCRIPTION:  XR CHEST 1 VW  [facility]: XR CHEST 1 VIEW     CLINICAL HISTORY:  25 years Male, cva    COMPARISON: Chest x-ray 3/3/2021.    FINDINGS:  Heart size and pulmonary vascularity appear within normal limits.  No pneumothorax is identified. No focal consolidation is seen.      Impression:      1.  No acute cardiopulmonary process.    Electronically signed by:  Erich Rodriguez DO  6/20/2021 5:39 AM CDT  Workstation: [id number]    CT Head Without Contrast [[id number]] Collected: 06/20/21 0331     Updated: 06/20/21 0515    Narrative:      CLINICAL HISTORY:  cva    EXAMINATION:  CT HEAD WO CONTRAST    COMPARISON:  10/19/2020    FINDINGS:   Contiguous 5 mm unenhanced axial images were obtained through the  brain. The following CT was done with automated exposure control.  The mA and KVP were adjusted to obtain quality images and lower  patient dose according to patient's size.    The ventricles and sulci are within normal limits in size and  appearance for age. There is no evidence of bleeding, mass  lesion, or midline shift. There is no extra-axial fluid  collection identified. There is no evidence to suggest an acute  large territory infarct. There is no significant focal  abnormality identified within the brain parenchyma. There is no  acute abnormality identified within the brain.      Impression:      Brain within normal limits in appearance for age.    Electronically signed by:  Jose J Moran DO  6/20/2021 5:14 AM CDT  Workstation: [id number]    CT Angiogram Carotids [[id number]] Resulted: 06/20/21 0404     Updated: 06/20/21 0406           Assessment / Plan       Hospital Problem List:    1.  TIA versus CVA.  Patient with persistent neurological deficits, CT scan is negative, ED evaluation and recommendations by neurologist for MRI scan of the brain, may need spine imaging defer to neurology.  We will check B12 levels and a sed rate, he is placed on aspirin and a statin and  will continue with neuro watch per protocol.    2.  Hypertension.  Continue home blood pressure medications with adjustments if needed.  As needed parenteral antihypertensive agents as a bridge to keep him within the normotensive range    3.  Hyperglycemia.  Denies being a diabetic, will check HbA1c for further evaluation.    4.  Morbid obesity BMI 70.48.  Diet education and lifestyle modification.    5.  Asthma.  Continue home medications he is compensated at present.    I discussed the patient's findings and my recommendations with patient.     Fernando Chance MD  06/20/21  05:57 CDT      Dictated Utilizing Dragon Dictation

## 2021-06-20 NOTE — THERAPY DISCHARGE NOTE
Acute Care - Speech Language Pathology   Swallow Initial Evaluation/Discharge [facility]     Patient Name: Venkatesh Parra  : 1995  MRN: [id number]  Today's Date: 2021               Admit Date: 2021    Visit Dx:    ICD-10-CM ICD-9-CM   1. Weakness  R53.1 780.79   2. Numbness on left side  R20.0 782.0   3. Transient ischemic attack (TIA)  G45.9 435.9     Patient Active Problem List   Diagnosis   • Syncope and collapse   • Weakness   • Transient ischemic attack (TIA)     Past Medical History:   Diagnosis Date   • Asthma    • Carpal tunnel syndrome    • Depression    • Diabetes mellitus (CMS/HCC)    • Hypertension    • Morbid obesity (CMS/HCC)    • Osteoarthritis    • PTSD (post-traumatic stress disorder)      Past Surgical History:   Procedure Laterality Date   • CHOLECYSTECTOMY            SWALLOW EVALUATION (last 72 hours)      SLP Adult Swallow Evaluation     Row Name 21 1014                   Rehab Evaluation    Patient/Family/Caregiver Comments/Observations  no family present  -EK           General Information    Prior Level of Function-Communication  WFL  -EK        Prior Level of Function-Swallowing  no diet consistency restrictions  -EK           Pain    Additional Documentation  Pain Scale: Numbers Pre/Post-Treatment (Group)  -EK           Pain Scale: Numbers Pre/Post-Treatment    Pretreatment Pain Rating  0/10 - no pain  -EK        Posttreatment Pain Rating  0/10 - no pain  -EK           Oral Motor Structure and Function    Dentition Assessment  natural, present and adequate  -EK        Secretion Management  WNL/WFL  -EK        Mucosal Quality  moist, healthy  -EK        Volitional Swallow  WFL  -EK        Volitional Cough  WFL  -EK           Oral Musculature and Cranial Nerve Assessment    Oral Motor General Assessment  WFL  -EK           General Eating/Swallowing Observations    Eating/Swallowing Skills  self-fed  -EK        Positioning During Eating  upright 90 degree;upright  in bed  -EK        Utensils Used  spoon;cup  -EK        Consistencies Trialed  regular textures;thin liquids  -EK        Pre SpO2 (%)  99  -EK        Post SpO2 (%)  99  -EK           Clinical Swallow Eval    Oral Prep Phase  WFL  -EK        Oral Transit  WFL  -EK        Oral Residue  WFL  -EK        Pharyngeal Phase  WFL  -EK        Clinical Swallow Evaluation Summary  Swallow and speech evaluation completed. Pt with no s/s of aspiration for regular solids and regular liquids. Pt speech and cognition WFL. No tx recommended at this time.    -EK           Clinical Impression    SLP Swallowing Diagnosis  swallow WFL  -EK        Functional Impact  no impact on function  -EK        Swallow Criteria for Skilled Therapeutic Interventions Met  no problems identified which require skilled intervention  -EK           Recommendations    Therapy Frequency (Swallow)  evaluation only  -EK        SLP Diet Recommendation  regular textures;thin liquids  -EK        Recommended Precautions and Strategies  upright posture during/after eating;small bites of food and sips of liquid  -EK        Oral Care Recommendations  Oral Care BID/PRN  -EK        SLP Rec. for Method of Medication Administration  meds whole;with thin liquids  -EK          User Key  (r) = Recorded By, (t) = Taken By, (c) = Cosigned By    Initials Name Effective Dates    Natalie Nails CCC-SLP 06/16/21 -           EDUCATION  The patient has been educated in the following areas:   Cognitive Impairment Communication Impairment Dysphagia (Swallowing Impairment).    SLP Recommendation and Plan  SLP Swallowing Diagnosis: swallow WFL  SLP Diet Recommendation: regular textures, thin liquids           Swallow Criteria for Skilled Therapeutic Interventions Met: no problems identified which require skilled intervention        Therapy Frequency (Swallow): evaluation only   Speech,cogntion, WFL.                                        Time Calculation:   Time  Calculation- SLP     Row Name 06/20/21 1035 06/20/21 1033          Time Calculation- SLP    SLP Start Time  --  -EK  1014  -EK     SLP Stop Time  --  1038  -EK     SLP Time Calculation (min)  --  24 min  -EK     SLP Received On  --  -EK  06/20/21  -EK       User Key  (r) = Recorded By, (t) = Taken By, (c) = Cosigned By    Initials Name Provider Type    Natalie Nails CCC-SLP Speech and Language Pathologist          Therapy Charges for Today     Code Description Service Date Service Provider Modifiers Qty    [id number]  ST EVAL ORAL PHARYNG SWALLOW 2 6/20/2021 Natalie Andujar CCC-SLP GN 1                    ANDRZEJ Anders  6/20/2021

## 2021-06-21 ENCOUNTER — APPOINTMENT (OUTPATIENT)
Dept: CT IMAGING | Facility: HOSPITAL | Age: 26
End: 2021-06-21

## 2021-06-21 ENCOUNTER — APPOINTMENT (OUTPATIENT)
Dept: CARDIOLOGY | Facility: HOSPITAL | Age: 26
End: 2021-06-21

## 2021-06-21 LAB
BH CV ECHO MEAS - ACS: 2.7 CM
BH CV ECHO MEAS - AO MAX PG (FULL): 1.9 MMHG
BH CV ECHO MEAS - AO MAX PG: 5.9 MMHG
BH CV ECHO MEAS - AO MEAN PG (FULL): 1 MMHG
BH CV ECHO MEAS - AO MEAN PG: 3 MMHG
BH CV ECHO MEAS - AO ROOT AREA (BSA CORRECTED): 1.3
BH CV ECHO MEAS - AO ROOT AREA: 11.3 CM^2
BH CV ECHO MEAS - AO ROOT DIAM: 3.8 CM
BH CV ECHO MEAS - AO V2 MAX: 121 CM/SEC
BH CV ECHO MEAS - AO V2 MEAN: 76.1 CM/SEC
BH CV ECHO MEAS - AO V2 VTI: 18.7 CM
BH CV ECHO MEAS - AVA(I,A): 3.8 CM^2
BH CV ECHO MEAS - AVA(I,D): 3.8 CM^2
BH CV ECHO MEAS - AVA(V,A): 3.4 CM^2
BH CV ECHO MEAS - AVA(V,D): 3.4 CM^2
BH CV ECHO MEAS - BSA(HAYCOCK): 3.2 M^2
BH CV ECHO MEAS - BSA: 2.9 M^2
BH CV ECHO MEAS - BZI_BMI: 70.5 KILOGRAMS/M^2
BH CV ECHO MEAS - BZI_METRIC_HEIGHT: 170.2 CM
BH CV ECHO MEAS - BZI_METRIC_WEIGHT: 204.1 KG
BH CV ECHO MEAS - EDV(CUBED): 82.3 ML
BH CV ECHO MEAS - EDV(MOD-SP2): 63.5 ML
BH CV ECHO MEAS - EDV(MOD-SP4): 136 ML
BH CV ECHO MEAS - EDV(TEICH): 85.4 ML
BH CV ECHO MEAS - EF(CUBED): 74.2 %
BH CV ECHO MEAS - EF(MOD-SP2): 75.6 %
BH CV ECHO MEAS - EF(MOD-SP4): 67.1 %
BH CV ECHO MEAS - EF(TEICH): 66.3 %
BH CV ECHO MEAS - ESV(CUBED): 21.3 ML
BH CV ECHO MEAS - ESV(MOD-SP2): 15.5 ML
BH CV ECHO MEAS - ESV(MOD-SP4): 44.7 ML
BH CV ECHO MEAS - ESV(TEICH): 28.8 ML
BH CV ECHO MEAS - FS: 36.3 %
BH CV ECHO MEAS - IVS/LVPW: 0.97
BH CV ECHO MEAS - IVSD: 1.1 CM
BH CV ECHO MEAS - LV DIASTOLIC VOL/BSA (35-75): 47.7 ML/M^2
BH CV ECHO MEAS - LV MASS(C)D: 173.5 GRAMS
BH CV ECHO MEAS - LV MASS(C)DI: 60.8 GRAMS/M^2
BH CV ECHO MEAS - LV MAX PG: 4 MMHG
BH CV ECHO MEAS - LV MEAN PG: 2 MMHG
BH CV ECHO MEAS - LV SYSTOLIC VOL/BSA (12-30): 15.7 ML/M^2
BH CV ECHO MEAS - LV V1 MAX: 99.5 CM/SEC
BH CV ECHO MEAS - LV V1 MEAN: 69 CM/SEC
BH CV ECHO MEAS - LV V1 VTI: 17.3 CM
BH CV ECHO MEAS - LVIDD: 4.4 CM
BH CV ECHO MEAS - LVIDS: 2.8 CM
BH CV ECHO MEAS - LVLD AP2: 8 CM
BH CV ECHO MEAS - LVLD AP4: 8 CM
BH CV ECHO MEAS - LVLS AP2: 6.7 CM
BH CV ECHO MEAS - LVLS AP4: 7.2 CM
BH CV ECHO MEAS - LVOT AREA (M): 4.2 CM^2
BH CV ECHO MEAS - LVOT AREA: 4.2 CM^2
BH CV ECHO MEAS - LVOT DIAM: 2.3 CM
BH CV ECHO MEAS - LVPWD: 1.2 CM
BH CV ECHO MEAS - MV A MAX VEL: 60.8 CM/SEC
BH CV ECHO MEAS - MV DEC SLOPE: 589 CM/SEC^2
BH CV ECHO MEAS - MV E MAX VEL: 79.7 CM/SEC
BH CV ECHO MEAS - MV E/A: 1.3
BH CV ECHO MEAS - MV MAX PG: 3.3 MMHG
BH CV ECHO MEAS - MV MEAN PG: 2 MMHG
BH CV ECHO MEAS - MV P1/2T MAX VEL: 90.7 CM/SEC
BH CV ECHO MEAS - MV P1/2T: 45.1 MSEC
BH CV ECHO MEAS - MV V2 MAX: 91.1 CM/SEC
BH CV ECHO MEAS - MV V2 MEAN: 68.3 CM/SEC
BH CV ECHO MEAS - MV V2 VTI: 21.3 CM
BH CV ECHO MEAS - MVA P1/2T LCG: 2.4 CM^2
BH CV ECHO MEAS - MVA(P1/2T): 4.9 CM^2
BH CV ECHO MEAS - MVA(VTI): 3.4 CM^2
BH CV ECHO MEAS - PA MAX PG: 3.3 MMHG
BH CV ECHO MEAS - PA V2 MAX: 91.1 CM/SEC
BH CV ECHO MEAS - RAP SYSTOLE: 5 MMHG
BH CV ECHO MEAS - RVDD: 2.4 CM
BH CV ECHO MEAS - RVSP: 30.2 MMHG
BH CV ECHO MEAS - SI(AO): 74.3 ML/M^2
BH CV ECHO MEAS - SI(CUBED): 21.4 ML/M^2
BH CV ECHO MEAS - SI(LVOT): 25.2 ML/M^2
BH CV ECHO MEAS - SI(MOD-SP2): 16.8 ML/M^2
BH CV ECHO MEAS - SI(MOD-SP4): 32 ML/M^2
BH CV ECHO MEAS - SI(TEICH): 19.8 ML/M^2
BH CV ECHO MEAS - SV(AO): 212.1 ML
BH CV ECHO MEAS - SV(CUBED): 61.1 ML
BH CV ECHO MEAS - SV(LVOT): 71.9 ML
BH CV ECHO MEAS - SV(MOD-SP2): 48 ML
BH CV ECHO MEAS - SV(MOD-SP4): 91.3 ML
BH CV ECHO MEAS - SV(TEICH): 56.6 ML
BH CV ECHO MEAS - TR MAX VEL: 251 CM/SEC
GLUCOSE BLDC GLUCOMTR-MCNC: 140 MG/DL (ref 70–130)
GLUCOSE BLDC GLUCOMTR-MCNC: 157 MG/DL (ref 70–130)
GLUCOSE BLDC GLUCOMTR-MCNC: 157 MG/DL (ref 70–130)
GLUCOSE BLDC GLUCOMTR-MCNC: 222 MG/DL (ref 70–130)
MAXIMAL PREDICTED HEART RATE: 194 BPM
STRESS TARGET HR: 165 BPM

## 2021-06-21 PROCEDURE — 82962 GLUCOSE BLOOD TEST: CPT

## 2021-06-21 PROCEDURE — 70450 CT HEAD/BRAIN W/O DYE: CPT

## 2021-06-21 PROCEDURE — 93306 TTE W/DOPPLER COMPLETE: CPT

## 2021-06-21 PROCEDURE — 97166 OT EVAL MOD COMPLEX 45 MIN: CPT

## 2021-06-21 PROCEDURE — 97162 PT EVAL MOD COMPLEX 30 MIN: CPT

## 2021-06-21 PROCEDURE — G0378 HOSPITAL OBSERVATION PER HR: HCPCS

## 2021-06-21 PROCEDURE — 93306 TTE W/DOPPLER COMPLETE: CPT | Performed by: INTERNAL MEDICINE

## 2021-06-21 PROCEDURE — 25010000002 PERFLUTREN (DEFINITY) 8.476 MG IN SODIUM CHLORIDE (PF) 0.9 % 10 ML INJECTION: Performed by: INTERNAL MEDICINE

## 2021-06-21 PROCEDURE — 99214 OFFICE O/P EST MOD 30 MIN: CPT | Performed by: NURSE PRACTITIONER

## 2021-06-21 RX ADMIN — ATORVASTATIN CALCIUM 80 MG: 40 TABLET, FILM COATED ORAL at 20:42

## 2021-06-21 RX ADMIN — LOSARTAN POTASSIUM 50 MG: 50 TABLET, FILM COATED ORAL at 08:35

## 2021-06-21 RX ADMIN — SODIUM CHLORIDE 125 ML/HR: 900 INJECTION, SOLUTION INTRAVENOUS at 15:22

## 2021-06-21 RX ADMIN — PERFLUTREN 1.5 ML: 6.52 INJECTION, SUSPENSION INTRAVENOUS at 14:43

## 2021-06-21 RX ADMIN — SODIUM CHLORIDE, PRESERVATIVE FREE 10 ML: 5 INJECTION INTRAVENOUS at 20:43

## 2021-06-21 RX ADMIN — ASPIRIN 325 MG: 325 TABLET ORAL at 08:34

## 2021-06-21 NOTE — PLAN OF CARE
Goal Outcome Evaluation:  Plan of Care Reviewed With: patient           Outcome Summary: OT eval completed this date, co-eval with PT. Pt is alert and cooperative. Pt SBA for bed mobility and mobility in room. Pt independent with sit to stand t/f. No LOB noted. Pt supervision to don and doff socks. Pt RUE is slightly stronger then LUE but pt is right handed. Pt reports some decreased light touch with LUE. RUE and LUE coordination is equal. Pt may benefit from further skilled OT to reach PLOF while in hospital but recommend to d/c home at discharge.

## 2021-06-21 NOTE — PLAN OF CARE
Goal Outcome Evaluation:  Plan of Care Reviewed With: patient             Problem: Adult Inpatient Plan of Care  Goal: Plan of Care Review  Recent Flowsheet Documentation  Taken 6/21/2021 0933 by Eleazar Solis, PT  Plan of Care Reviewed With: patient  Outcome Summary:  Initial PT evaluation complete; co-evaluation with OT.  Patient is alert and cooperative.  He requires SPV with bed mobilty, demonstrates (I) with transfers and gait, ambulating 45'x1 without an AD, lists slightly L and R, no LOB, good safety awareness. Tinetti assessed: 26/28 or low fall risk; FWW not necessary.  LE strength is largely equal L to R; RLE may be slightly stronger, but this is expected as patient is R hand dominant. Coordination in LEs is equal L to R.  Modified Bertie, pre: 0, post: 1. Patient reports decreased light touch sensation in LLE but it is mild enough that it does not adversely impact his gait or balance.  Patient is unlikely to need f/u PT after discharge. Goals established to increase activity tolerance and LE strength, continue skilled PT.

## 2021-06-21 NOTE — THERAPY EVALUATION
Patient Name: Venkatesh Parra  : 1995    MRN: [id number]                              Today's Date: 2021       Admit Date: 2021    Visit Dx:     ICD-10-CM ICD-9-CM   1. Weakness  R53.1 780.79   2. Numbness on left side  R20.0 782.0   3. Transient ischemic attack (TIA)  G45.9 435.9   4. Impaired functional mobility, balance, gait, and endurance  Z74.09 V49.89     Patient Active Problem List   Diagnosis   • Syncope and collapse   • Weakness   • Transient ischemic attack (TIA)   • Hypertension   • Morbid obesity (CMS/HCC)   • Diabetes mellitus (CMS/HCC)   • Left-sided back pain     Past Medical History:   Diagnosis Date   • Asthma    • Carpal tunnel syndrome    • Depression    • Diabetes mellitus (CMS/HCC)    • Hypertension    • Morbid obesity (CMS/HCC)    • Osteoarthritis    • PTSD (post-traumatic stress disorder)      Past Surgical History:   Procedure Laterality Date   • CHOLECYSTECTOMY       General Information     Row Name 21          Physical Therapy Time and Intention    Document Type  evaluation  -CZ     Mode of Treatment  co-treatment;physical therapy;occupational therapy  -CZ     Row Name 21          General Information    Patient Profile Reviewed  yes  -CZ     Prior Level of Function  independent:;all household mobility;community mobility  -CZ     Row Name 21          Living Environment    Lives With  alone  -CZ     Row Name 21          Home Main Entrance    Number of Stairs, Main Entrance  two  -CZ     Stair Railings, Main Entrance  none  -CZ     Row Name 21          Stairs Within Home, Primary    Stairs, Within Home, Primary  (I) ADLs, iADLs. Tub/shower. Glasses.  -CZ     Number of Stairs, Within Home, Primary  none  -CZ     Row Name 21          Cognition    Orientation Status (Cognition)  oriented x 4  -CZ     Row Name 21          Safety Issues, Functional Mobility    Impairments Affecting Function (Mobility)   endurance/activity tolerance  -CZ       User Key  (r) = Recorded By, (t) = Taken By, (c) = Cosigned By    Initials Name Provider Type    CZ Eleazar Solis, PT Physical Therapist        Mobility     Row Name 06/21/21 0945          Bed Mobility    Bed Mobility  supine-sit  -CZ     Supine-Sit [place] (Bed Mobility)  supervision  -CZ     Assistive Device (Bed Mobility)  bed rails;head of bed elevated  -CZ     Row Name 06/21/21 0945          Sit-Stand Transfer    Sit-Stand [place] (Transfers)  independent  -CZ     Row Name 06/21/21 0945          Gait/Stairs (Locomotion)    [place] Level (Gait)  independent  -CZ     Distance in Feet (Gait)  45'x1.  -CZ     Comment (Gait/Stairs)  Lists L and R mildly, no LOB.  -CZ       User Key  (r) = Recorded By, (t) = Taken By, (c) = Cosigned By    Initials Name Provider Type    CZ Eleazar Solis, PT Physical Therapist        Obj/Interventions     Row Name 06/21/21 0945          Range of Motion Comprehensive    General Range of Motion  bilateral lower extremity ROM WFL  -CZ     Row Name 06/21/21 0945          Strength Comprehensive (MMT)    General Manual Muscle Testing (MMT) Assessment  other (see comments)  -CZ     Comment, General Manual Muscle Testing (MMT) Assessment  BLEs: 4+/5 grossly. RLE is slightly stronger but patient is also R handed.  -CZ     Row Name 06/21/21 0945          Sensory Assessment (Somatosensory)    Sensory Assessment (Somatosensory)  other (see comments) Patient repors decreased light touch LLE.  -CZ       User Key  (r) = Recorded By, (t) = Taken By, (c) = Cosigned By    Initials Name Provider Type    CZ Eleazar Solis, PT Physical Therapist        Goals/Plan     Row Name 06/21/21 0945          Bed Mobility Goal 1 (PT)    Activity/Assistive Device (Bed Mobility Goal 1, PT)  sit to supine/supine to sit  -CZ     [place] Level/Cues Needed (Bed Mobility Goal 1, PT)  independent  -CZ     Time Frame (Bed Mobility Goal 1, PT)  by discharge   -CZ     Strategies/Barriers (Bed Mobility Goal 1, PT)  HOB flat, no bed rails.  -CZ     Progress/Outcomes (Bed Mobility Goal 1, PT)  goal not met  -CZ     Row Name 06/21/21 0945          Gait Training Goal 1 (PT)    Activity/Assistive Device (Gait Training Goal 1, PT)  gait (walking locomotion)  -CZ     [place] Level (Gait Training Goal 1, PT)  independent  -CZ     Distance (Gait Training Goal 1, PT)  150'x1.  -CZ     Time Frame (Gait Training Goal 1, PT)  by discharge  -CZ     Strategies/Barriers (Gait Training Goal 1, PT)  Decrease sensation reported, LLE.  -CZ     Row Name 06/21/21 0945          Stairs Goal 1 (PT)    Activity/Assistive Device (Stairs Goal 1, PT)  descending stairs;ascending stairs  -CZ     [place] Level/Cues Needed (Stairs Goal 1, PT)  independent  -CZ     Number of Stairs (Stairs Goal 1, PT)  2 steps, no rails.  -CZ     Time Frame (Stairs Goal 1, PT)  short term goal (STG);by discharge  -CZ     Strategies/Barriers (Stairs Goal 1, PT)  Decrease sensation reported, LLE.  -CZ     Progress/Outcome (Stairs Goal 1, PT)  goal not met  -CZ       User Key  (r) = Recorded By, (t) = Taken By, (c) = Cosigned By    Initials Name Provider Type    CZ Eleazar Solis, PT Physical Therapist        Clinical Impression     Row Name 06/21/21 0945          Pain    Additional Documentation  Pain Scale: Numbers Pre/Post-Treatment (Group)  -CZ     Row Name 06/21/21 0945          Pain Scale: Numbers Pre/Post-Treatment    Pretreatment Pain Rating  7/10  -CZ     Pain Location  hip  -CZ     Pain Intervention(s)  Repositioned;Ambulation/increased activity;Distraction  -CZ     Row Name 06/21/21 0945          Plan of Care Review    Plan of Care Reviewed With  patient  -CZ     Outcome Summary  Initial PT evaluation complete; co-evaluation with OT.  Patient is alert and cooperative.  He requires SPV with bed mobilty, demonstrates (I) with transfers and gait, ambulating 45'x1 without an AD, lists slightly L and R, no  LOB, good safety awareness. Tinetti assessed: 26/28 or low fall risk; FWW not necessary.  LE strength is largely equal L to R; RLE may be slightly stronger, but this is expected as patient is R hand dominant. Coordination in LEs is equal L to R.  Modified [place], pre: 0, post: 1. Patient reports decreased light touch sensation in LLE but it is mild enough that it does not adversely impact his gait or balance.  Patient is unlikely to need f/u PT after discharge. Goals established to increase activity tolerance and LE strength, continue skilled PT.  -CZ     Row Name 06/21/21 0945          Therapy Assessment/Plan (PT)    Rehab Potential (PT)  good, to achieve stated therapy goals  -     Criteria for Skilled Interventions Met (PT)  yes;skilled treatment is necessary  -CZ     Row Name 06/21/21 0945          Vital Signs    Pre Systolic BP Rehab  109  -CZ     Pre Treatment Diastolic BP  67  -CZ     Intra Systolic BP Rehab  142  -CZ     Intra Treatment Diastolic BP  106  -CZ     Post Systolic BP Rehab  120  -CZ     Post Treatment Diastolic BP  72  -CZ     Pretreatment Heart Rate (beats/min)  99  -CZ     Posttreatment Heart Rate (beats/min)  107  -CZ     Pre SpO2 (%)  96  -CZ     O2 Delivery Pre Treatment  room air  -CZ     Post SpO2 (%)  98  -CZ     O2 Delivery Post Treatment  room air  -CZ     Pre Patient Position  Side Lying  -CZ     Intra Patient Position  Sitting After gait.  -CZ     Post Patient Position  Supine  -CZ     Row Name 06/21/21 0945          Positioning and Restraints    Pre-Treatment Position  in bed  -CZ     Post Treatment Position  bed  -CZ     In Bed  supine;call light within reach;encouraged to call for assist  -CZ       User Key  (r) = Recorded By, (t) = Taken By, (c) = Cosigned By    Initials Name Provider Type    Eleazar Tafoya, PT Physical Therapist        Outcome Measures     Row Name 06/21/21 0945          How much help from another person do you currently need...    Turning from your back  "to your side while in flat bed without using bedrails?  3  -CZ     Moving from lying on back to sitting on the side of a flat bed without bedrails?  4  -CZ     Moving to and from a bed to a chair (including a wheelchair)?  4  -CZ     Standing up from a chair using your arms (e.g., wheelchair, bedside chair)?  4  -CZ     Climbing 3-5 steps with a railing?  4  -CZ     To walk in hospital room?  4  -CZ     AM-PAC 6 Clicks Score (PT)  23  -CZ     Row Name 06/21/21 0945          Modified [place] Scale    Pre-Stroke Modified [place] Scale  0 - No Symptoms at all.  -CZ     Modified [place] Scale  1 - No significant disability despite symptoms.  Able to carry out all usual duties and activities.  -CZ     Row Name 06/21/21 0945          Tinetti Assessment    Tinetti Assessment  yes  -CZ     Sitting Balance  1  -CZ     Arises  2  -CZ     Attempts to Rise  2  -CZ     Immediate Standing Balance (first 5 sec)  2  -CZ     Standing Balance  1  -CZ     Sternal Nudge (feet close together)  2  -CZ     Eyes Closed (feet close together)  1  -CZ     Turning 360 Degrees- Steps  1  -CZ     Turning 360 Degrees- Steadiness  1  -CZ     Sitting Down  2  -CZ     Tinetti Balance Score  15  -CZ     Gait Initiation (immediate after told \"go\")  1  -CZ     Step Length- Right Swing  1  -CZ     Step Length- Left Swing  1  -CZ     Foot Clearance- Right Foot  1  -CZ     Foot Clearance- Left Foot  1  -CZ     Step Symmetry  1  -CZ     Step Continuity  1  -CZ     Path (excursion)  1  -CZ     Trunk  2  -CZ     Base of Support  1  -CZ     Gait Score  11  -CZ     Tinetti Total Score  26  -CZ     Tinetti Assessment Comments  No AD.  Lists mildly L and R.  -CZ     Row Name 06/21/21 0945          Functional Assessment    Outcome Measure Options  AM-PAC 6 Clicks Basic Mobility (PT);T[place];Modified [place]  -CZ       User Key  (r) = Recorded By, (t) = Taken By, (c) = Cosigned By    Initials Name Provider Type    CZ Eleazar Solis, PT Physical Therapist    "     Physical Therapy Education                 Title: PT OT SLP Therapies (In Progress)     Topic: Physical Therapy (In Progress)     Point: Mobility training (Not Started)     Learner Progress:  Not documented in this visit.          Point: Home exercise program (Not Started)     Learner Progress:  Not documented in this visit.          Point: Body mechanics (Not Started)     Learner Progress:  Not documented in this visit.          Point: Precautions (Done)     Learning Progress Summary           Patient Acceptance, E,TB, VU by  at 6/21/2021 5086    Comment: Daniel assessed: 26/28 or low fall risk; FWW not necessary.                               User Key     Initials Effective Dates Name Provider Type Discipline     06/16/21 -  Eleazra Solis, PT Physical Therapist PT              PT Recommendation and Plan  Planned Therapy Interventions (PT): bed mobility training, gait training, patient/family education, transfer training, stair training, strengthening, stretching  Plan of Care Reviewed With: patient  Outcome Summary: Initial PT evaluation complete; co-evaluation with OT.  Patient is alert and cooperative.  He requires SPV with bed mobilty, demonstrates (I) with transfers and gait, ambulating 45'x1 without an AD, lists slightly L and R, no LOB, good safety awareness. Tinetti assessed: 26/28 or low fall risk; FWW not necessary.  LE strength is largely equal L to R; RLE may be slightly stronger, but this is expected as patient is R hand dominant. Coordination in LEs is equal L to R.  Modified [place], pre: 0, post: 1. Patient reports decreased light touch sensation in LLE but it is mild enough that it does not adversely impact his gait or balance.  Patient is unlikely to need f/u PT after discharge. Goals established to increase activity tolerance and LE strength, continue skilled PT.     Time Calculation:   PT Charges     Row Name 06/21/21 6062             Time Calculation    Start Time  0920  -       Stop Time  1027  -CZ      Time Calculation (min)  42 min  -CZ      PT Received On  06/21/21  -CZ      PT Goal Re-Cert Due Date  07/04/21  -CZ         Untimed Charges    PT Eval/Re-eval Minutes  42  -CZ         Total Minutes    Untimed Charges Total Minutes  42  -CZ       Total Minutes  42  -CZ        User Key  (r) = Recorded By, (t) = Taken By, (c) = Cosigned By    Initials Name Provider Type    CZ Eleazar Solis, PT Physical Therapist        Therapy Charges for Today     Code Description Service Date Service Provider Modifiers Qty    85507552822 HC PT EVAL MOD COMPLEXITY 3 6/21/2021 Eleazar Solis, PT GP 1          PT G-Codes  Outcome Measure Options: AM-PAC 6 Clicks Basic Mobility (PT), Tinetti, Modified [place]  AM-PAC 6 Clicks Score (PT): 23  Modified Majo Scale: 1 - No significant disability despite symptoms.  Able to carry out all usual duties and activities.  [place] Total Score: 26    Eleazar Solis, [place]  6/21/2021

## 2021-06-21 NOTE — PLAN OF CARE
Goal Outcome Evaluation:  Plan of Care Reviewed With: patient        Progress: no change  Outcome Summary: VSS; no complaints; resting between care; will continue to monitor

## 2021-06-21 NOTE — PLAN OF CARE
Goal Outcome Evaluation:  Plan of Care Reviewed With: patient           Outcome Summary: RD visit to provide ADA diet and wt loss ed to pt. Pt showed very little interest or motivation. Will cont to encourage pt to make healthy diet choices at d/c.

## 2021-06-21 NOTE — CONSULTS
"Adult Nutrition  Assessment    Patient Name:  Venkatesh Parra  YOB: 1995  MRN: [id number]  Admit Date:  6/20/2021    Assessment Date:  6/21/2021    Comments:  Pt presents at BMI 70 and 304% IBW which is compatible w/morbid obesity Admit r/t left side weakness. RD consult to provide ed r/t new DM and need for weight loss. Pt seems a bit in denial of DM, says he \"isn't sure about that\". Pt was agreeable for pt to review ADA diet and weight loss info, but showed very little interest or motivation. RD left handouts in room and will attempt to see again prior to d/c to emphasize importance of healthy weight/Bg control for optimal health and offer encouragement.     Reason for Assessment     Row Name 06/21/21 1329          Reason for Assessment    Reason For Assessment  nurse/nurse practitioner consult     Diagnosis  diabetes diagnosis/complications     Identified At Risk by Screening Criteria  BMI         Nutrition/Diet History     Row Name 06/21/21 1329          Nutrition/Diet History    Typical Food/Fluid Intake  Pt says he isn't sure he has actual DM dx. Agreeable for RD to review diet ed, but doesn't appear interested or motivated.           Labs/Tests/Procedures/Meds     Row Name 06/21/21 1331          Labs/Procedures/Meds    Lab Results Comments  A1c 7.2, Triglyerides 246, Gl 157        Medications    Pertinent Medications Reviewed  reviewed         Physical Findings     Row Name 06/21/21 1331          Physical Findings    Overall Physical Appearance  obese                     Electronically signed by:  Jud Murcia RD  06/21/21 13:35 CDT  "

## 2021-06-21 NOTE — PROGRESS NOTES
Stroke Progress Note       Chief Complaint: Left-sided numbness    Subjective     HPI: Pt is a 25-yr-old right-handed white male with known diagnosis of hypertension, morbid obesity, JESUS, tobacco use, and asthma who presented with left-side numbness and weakness on face, arm, and leg. This morning he states weakness has resolved but still has some decreased sensation to his left arm and leg.      Review of Systems   HENT: Negative.    Eyes: Negative.    Respiratory: Negative.    Cardiovascular: Negative.    Gastrointestinal: Negative.    Genitourinary: Negative.    Musculoskeletal: Negative.    Skin: Negative.    Neurological: Positive for numbness.   Psychiatric/Behavioral: Negative.         Objective      Temp:  [96.2 °F (35.7 °C)-97.6 °F (36.4 °C)] 97 °F (36.1 °C)  Heart Rate:  [] 89  Resp:  [18-22] 18  BP: (107-178)/(58-89) 126/81    Neurological Exam  Mental Status  Awake, alert and oriented to person, place and time.Alert. Recent and remote memory are intact. Speech is normal. Language is fluent with no aphasia. Attention and concentration are normal.    Cranial Nerves  CN II: Visual acuity is normal. Visual fields full to confrontation.  CN III, IV, VI: Extraocular movements intact bilaterally. Normal lids and orbits bilaterally. Pupils equal round and reactive to light bilaterally.  CN V: Facial sensation is normal.  CN VII: Full and symmetric facial movement.  CN IX, X: Palate elevates symmetrically. Normal gag reflex.  CN XI: Shoulder shrug strength is normal.  CN XII: Tongue midline without atrophy or fasciculations.    Motor  Normal muscle bulk throughout. No fasciculations present. Strength is 5/5 throughout all four extremities.    Sensory  Light touch abnormality: Left arm and leg.     Reflexes  Not tested..    Coordination  Finger-to-nose, rapid alternating movements and heel-to-shin normal bilaterally without dysmetria.    Gait  Not tested..      Physical Exam  Vitals and nursing note  reviewed.   HENT:      Head: Normocephalic and atraumatic.   Eyes:      General: Lids are normal.      Extraocular Movements: Extraocular movements intact.      Pupils: Pupils are equal, round, and reactive to light.   Cardiovascular:      Rate and Rhythm: Normal rate and regular rhythm.   Pulmonary:      Effort: Pulmonary effort is normal.   Musculoskeletal:         General: Normal range of motion.      Cervical back: Normal range of motion.   Skin:     General: Skin is warm and dry.   Neurological:      Mental Status: He is alert and oriented to person, place, and time.      Sensory: Sensory deficit present.      Coordination: Coordination is intact.      Deep Tendon Reflexes: Strength normal.   Psychiatric:         Mood and Affect: Mood normal.         Speech: Speech normal.         Results Review:    I reviewed the patient's new clinical results.    Lab Results (last 24 hours)     Procedure Component Value Units Date/Time    POC Glucose Once [[id number]]  (Abnormal) Collected: 06/21/21 0716    Specimen: Blood Updated: 06/21/21 0748     Glucose 140 mg/dL      Comment: RN NotifiedOperator: [id number] LORETTA Osbornter ID: [id number]       Vitamin B12 [[id number]]  (Normal) Collected: 06/20/21 0955    Specimen: Blood Updated: 06/20/21 2130     Vitamin B-12 337 pg/mL     Narrative:      Results may be falsely increased if patient taking Biotin.      POC Glucose Once [[id number]]  (Abnormal) Collected: 06/20/21 1922    Specimen: Blood Updated: 06/20/21 1936     Glucose 181 mg/dL      Comment: RN NotifiedOperator: [id number] JUVENTINO SUAZOMeter ID: [id number]       POC Glucose Once [[id number]]  (Abnormal) Collected: 06/20/21 1644    Specimen: Blood Updated: 06/20/21 1658     Glucose 154 mg/dL      Comment: RN NotifiedOperator: [id number] KALA Knott ID: [id number]           CT Head Without Contrast    Result Date: 6/21/2021  Negative CT head without contrast with no acute intracranial abnormalities. [id number]  Electronically signed by:  Juan David Nugent MD  6/21/2021 9:26 AM CDT Workstation: [id number]    CT Head Without Contrast    Addendum Date: 6/21/2021     ADDENDUM ADDENDUM #1 CRITICAL RESULT: Notification was made by the communication staff specialist, Gisella Mendez,who confirmed that  Monique Mullins RN  confirmed receipt of the dictated report by Dr. Moran and expressed understanding of the provided report findings on 6/20/2021 at 5:18 AM CST. Electronically signed by:  Jose J Moran DO  6/21/2021 2:14 AM CDT Workstation: [id number]     Result Date: 6/21/2021  Brain within normal limits in appearance for age. Electronically signed by:  Jose J Moran DO  6/20/2021 5:14 AM CDT Workstation: [id number]    XR Chest 1 View    Result Date: 6/20/2021  1.  No acute cardiopulmonary process. Electronically signed by:  Erich Rodriguez DO  6/20/2021 5:39 AM CDT Workstation: [id number]    CT Angiogram Carotids    Result Date: 6/20/2021  Study limited by factors related to quantum mottle artifact associated with the body habitus of the patient. No evidence of significant atherosclerotic change or stenosis within the neck or [place] vasculature. 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 The proximal segment of the right vertebral artery cannot be accurately assessed given the limitations of the study. Electronically signed by:  Jose J Moran DO  6/20/2021 5:40 AM CDT Workstation: [id number]    CT Angiogram Head    Result Date: 6/20/2021  Study limited by factors related to quantum mottle artifact associated with the body habitus of the patient. No evidence of significant atherosclerotic change or stenosis within the neck or [place] vasculature. 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 The proximal segment of the right vertebral artery cannot be accurately assessed given the limitations of the study. Electronically signed by:  Jose J Moran DO  6/20/2021 5:40 AM CDT  Workstation: [id number]    Results for orders placed during the hospital encounter of 11/13/20    Adult Transthoracic Echo Complete W/ Cont if Necessary Per Protocol    Interpretation Summary  · Left ventricular ejection fraction appears to be 56 - 60%.This was done with definity .  · Overall study otherwise was subootimal o assess structures with certainity.        Assessment/Plan     Assessment/Plan: Pt is a 25-yr-old right-handed white male with known diagnosis of hypertension, morbid obesity, JESUS, tobacco use, and asthma who presented with left-side numbness and weakness on face, arm, and leg. This morning he states weakness has resolved but still has some decreased sensation to his left arm and leg.  1. Left arm and leg numbness- Likely right thalamic stroke d/t his risk factors and family history of stroke. He cannot have MRI d/t size, repeat CT was neg, recommend outpatient open MRI at [place] then follow-up with neuro in four weeks. Start aspirin 325 mg/day and Lipitor 80 mg/day for secondary stroke prevention for now.   2. Hypertension- Not taking any home BP meds at this time d/t low BP. Currently 126/81. Instructed on BP monitoring.  3. A1C is 7.2, instructed on glucose monitoring and control to reduce stroke risk.  4. Tobacco use- States he currently smokes occasionally and dips. Instructed on the importance of tobacco cessation to reduce stroke risk.  5.Morbid obesity- Instructed on weight reduction to reduce stroke risk.  6. Activity- Continue to work with PT/OT.  7. Diet- ADA heart-healthy diet.   Case was discussed with pt, Dr. Marino, Hospitalist team, and nursing.             Patient Active Problem List   Diagnosis   • Syncope and collapse   • Weakness   • Transient ischemic attack (TIA)   • Hypertension   • Morbid obesity (CMS/HCC)   • Diabetes mellitus (CMS/HCC)   • Left-sided back pain           WILFREDO Velasquez  06/21/21  10:43 CDT

## 2021-06-21 NOTE — PROGRESS NOTES
[facility] Medicine Services  INPATIENT PROGRESS NOTE    Length of Stay: 0  Date of Admission: 6/20/2021  Primary Care Physician: Provider, No Known    Subjective   Chief Complaint: Left-sided numbness    HPI: This is a 25-year-old male with past medical history of asthma, hypertension, PTSD, arthritis and JESUS that presented to [facility] on 6/20/2021 with complaints of left-sided numbness in both his upper and lower extremities.  Patient was evaluated by neurology and diagnosed with a likely right thalamic stroke due to his risk factors and family history of stroke.  Patient was unable to have an MRI due to size.  Repeat CT scanning of the head was negative.  Patient and recommended to have an open MRI at [facility] and follow-up with neurology in 4 weeks.  He was started on stroke prevention of aspirin and Lipitor.  He was started on Cozaar for blood pressure.    Review of Systems   Constitutional: Negative for activity change and fatigue.   HENT: Negative for ear pain and sore throat.    Eyes: Negative for pain and discharge.   Respiratory: Negative for cough and shortness of breath.    Cardiovascular: Negative for chest pain and palpitations.   Gastrointestinal: Negative for abdominal pain and nausea.   Endocrine: Negative for cold intolerance and heat intolerance.   Genitourinary: Negative for difficulty urinating and dysuria.   Musculoskeletal: Negative for arthralgias and gait problem.   Skin: Negative for color change and rash.   Neurological: Positive for numbness. Negative for dizziness and weakness.   Psychiatric/Behavioral: Negative for agitation and confusion.        Objective    Temp:  [96 °F (35.6 °C)-97.6 °F (36.4 °C)] 96 °F (35.6 °C)  Heart Rate:  [] 93  Resp:  [18-20] 18  BP: (107-134)/(58-89) 114/66    Physical Exam  Constitutional:       Appearance: He is well-developed.   HENT:      Head: Normocephalic and atraumatic.   Eyes:      Pupils:  Pupils are equal, round, and reactive to light.   Cardiovascular:      Rate and Rhythm: Normal rate and regular rhythm.   Pulmonary:      Effort: Pulmonary effort is normal.      Breath sounds: Normal breath sounds.   Abdominal:      General: Bowel sounds are normal.      Palpations: Abdomen is soft.   Musculoskeletal:         General: Normal range of motion.      Cervical back: Normal range of motion and neck supple.   Skin:     General: Skin is warm and dry.   Neurological:      Mental Status: He is alert and oriented to person, place, and time.   Psychiatric:         Behavior: Behavior normal.       Results Review:  I have reviewed the labs, radiology results, and diagnostic studies.    Laboratory Data:   Results from last 7 days   Lab Units 06/20/21  0159   SODIUM mmol/L 138   POTASSIUM mmol/L 3.7   CHLORIDE mmol/L 102   CO2 mmol/L 27.0   BUN mg/dL 10   CREATININE mg/dL 0.68*   GLUCOSE mg/dL 192*   CALCIUM mg/dL 9.1   BILIRUBIN mg/dL 0.6   ALK PHOS U/L 82   ALT (SGPT) U/L 38   AST (SGOT) U/L 22   ANION GAP mmol/L 9.0     Estimated Creatinine Clearance: 281.7 mL/min (A) (by C-G formula based on SCr of 0.68 mg/dL (L)).  Results from last 7 days   Lab Units 06/17/21  1915   MAGNESIUM MG/DL 2.2     Results from last 7 days   Lab Units 06/20/21  0955   URIC ACID mg/dL 4.2     Results from last 7 days   Lab Units 06/20/21  0159 06/17/21  1915   WBC 10*3/mm3 10.20 10.1   HEMOGLOBIN g/dL 12.6* 13.2   HEMATOCRIT % 39.2 41.8   PLATELETS 10*3/mm3 304 318     Results from last 7 days   Lab Units 06/20/21  0354   INR  1.02       Culture Data:   No results found for: BLOODCX  No results found for: URINECX  No results found for: RESPCX  No results found for: WOUNDCX  No results found for: STOOLCX  No components found for: BODYFLD    Radiology Data:   Imaging Results (Last 24 Hours)     Procedure Component Value Units Date/Time    CT Head Without Contrast [[id number]] Collected: 06/21/21 0912     Updated: 06/21/21 0936     Narrative:      PROCEDURE: CT head without contrast.    INDICATION: Stroke, follow up, R53.1 Weakness R20.0 Anesthesia of  skin G45.9 Transient cerebral ischemic attack, unspecified    COMPARISON: 6/28/2021 CT head and earlier exam 10/19/2020.    Total DLP 1081.2 mGy-cm.    TECHNIQUE: This exam was performed according to our departmental  dose-optimization program, which includes automated exposure  control, adjustment of the mA and/or kV according to patient size  and/or use of iterative reconstruction technique. CT of the head  without contrast performed with axial and reconstructed sagittal  and coronal images.    FINDINGS:    The bony calvarium is intact.  The frontal, ethmoid, sphenoid and visualized portions of the  maxillary sinuses are clear.  Mastoid air cells are clear.    No abnormal extra-axial fluid collections.  No intracranial bleed.  No mass effect or midline shift.  No abnormal focal areas of hypodensity or acute ischemic change.    Ventricles normal size and shape.      Impression:      Negative CT head without contrast with no acute  intracranial abnormalities.    [id number]      Electronically signed by:  Juan David Nugent MD  6/21/2021 9:26  AM CDT Workstation: [id number]    CT Head Without Contrast [[id number]] Collected: 06/20/21 0331     Updated: 06/21/21 0215    Addenda:         ADDENDUM   ADDENDUM #1       CRITICAL RESULT: Notification was made by the communication staff  specialist, Gisella Mendez,who confirmed that  Monique Mullins RN  confirmed receipt of the dictated report by Dr. Moran and  expressed understanding of the provided report findings on  6/20/2021 at 5:18 AM CST.    Electronically signed by:  Jose J Moran DO  6/21/2021 2:14 AM CDT  Workstation: [id number]    Signed: 06/21/21 0214 by Jose J Moran DO    Narrative:      CLINICAL HISTORY:  cva    EXAMINATION:  CT HEAD WO CONTRAST    COMPARISON:  10/19/2020    FINDINGS:   Contiguous 5 mm unenhanced axial images were obtained  through the  brain. The following CT was done with automated exposure control.  The mA and KVP were adjusted to obtain quality images and lower  patient dose according to patient's size.    The ventricles and sulci are within normal limits in size and  appearance for age. There is no evidence of bleeding, mass  lesion, or midline shift. There is no extra-axial fluid  collection identified. There is no evidence to suggest an acute  large territory infarct. There is no significant focal  abnormality identified within the brain parenchyma. There is no  acute abnormality identified within the brain.      Impression:      Brain within normal limits in appearance for age.    Electronically signed by:  Jose J Moran DO  6/20/2021 5:14 AM CDT  Workstation: [id number]          I have reviewed the patient's current medications.     Assessment/Plan     Active Hospital Problems    Diagnosis  POA   • **Left-sided back pain [M54.9]  Yes   • Hypertension [I10]  Yes   • Morbid obesity (CMS/HCC) [E66.01]  Yes   • Diabetes mellitus (CMS/HCC) [E11.9]  Yes       Plan:    1.  Left-sided weakness: Neurology consult appreciated.  CT of the head x2 .  Echocardiogram negative.  Continue Lipitor and aspirin.  Open MRI of the head scheduled outpatient.   2.  Diabetes mellitus, type II: Start Metformin.  3.  Hypertension: Continue Cozaar.      Discharge Planning: I expect patient to be discharged to home in 1-2 days.    I confirmed that the patient's Advance Care Plan is present, code status is documented, or surrogate decision maker is listed in the patient's medical record.      I have utilized all available immediate resources to obtain, update, or review the patient's current medications.         This document has been electronically signed by WILFREDO Gaming on June 21, 2021 15:17 CDT

## 2021-06-21 NOTE — PLAN OF CARE
Goal Outcome Evaluation:              Outcome Summary: Pt vss. Pt resting well, no complaints of pain at this time.

## 2021-06-21 NOTE — THERAPY EVALUATION
Patient Name: Venkatesh Parra  : 1995    MRN: [id number]                              Today's Date: 2021       Admit Date: 2021    Visit Dx:     ICD-10-CM ICD-9-CM   1. Weakness  R53.1 780.79   2. Numbness on left side  R20.0 782.0   3. Transient ischemic attack (TIA)  G45.9 435.9   4. Impaired functional mobility, balance, gait, and endurance  Z74.09 V49.89   5. Impaired mobility and ADLs  Z74.09 V49.89    Z78.9      Patient Active Problem List   Diagnosis   • Syncope and collapse   • Weakness   • Transient ischemic attack (TIA)   • Hypertension   • Morbid obesity (CMS/HCC)   • Diabetes mellitus (CMS/HCC)   • Left-sided back pain     Past Medical History:   Diagnosis Date   • Asthma    • Carpal tunnel syndrome    • Depression    • Diabetes mellitus (CMS/HCC)    • Hypertension    • Morbid obesity (CMS/HCC)    • Osteoarthritis    • PTSD (post-traumatic stress disorder)      Past Surgical History:   Procedure Laterality Date   • CHOLECYSTECTOMY       General Information     [facility] [place]          OT Time and Intention    Document Type  evaluation  -     Mode of Treatment  co-treatment;occupational therapy;physical therapy  -     Row Name 21          General Information    Patient Profile Reviewed  yes  -     Prior Level of Function  independent:;all household mobility;community mobility;transfer;bed mobility;home management;driving;ADL's;shopping;using stairs;work  -     Existing Precautions/Restrictions  fall  -     Row Name 21          Living Environment    Lives With  alone  -     Row Name 21          Home Main Entrance    Number of Stairs, Main Entrance  two  -     Stair Railings, Main Entrance  none  -     Row Name 21          Stairs Within Home, Primary    Stairs, Within Home, Primary  tub shower  -     Number of Stairs, Within Home, Primary  none  -     Row Name 21          Cognition    Orientation Status  (Cognition)  oriented x 4  -     Row Name 06/21/21 0946          Safety Issues, Functional Mobility    Safety Issues Affecting Function (Mobility)  safety precaution awareness;safety precautions follow-through/compliance  -     Impairments Affecting Function (Mobility)  endurance/activity tolerance  -       User Key  (r) = Recorded By, (t) = Taken By, (c) = Cosigned By    Initials Name Provider Type     Brook Weems, OTR/L Occupational Therapist          Mobility/ADL's     Row Name 06/21/21 0946          Bed Mobility    Bed Mobility  supine-sit;sit-supine  -     Supine-Sit [place] (Bed Mobility)  supervision  -     Sit-Supine [place] (Bed Mobility)  supervision  -     Assistive Device (Bed Mobility)  bed rails;head of bed elevated  -     Row Name 06/21/21 0946          Transfers    Transfers  sit-stand transfer;toilet transfer  -     Sit-Stand [place] (Transfers)  independent  -     [place] Level (Toilet Transfer)  modified independence;supervision  -BH     Row Name 06/21/21 0946          Functional Mobility    Functional Mobility- Ind. Level  supervision required  -     Functional Mobility- Comment  no LOB  -[facility] Name 06/21/21 0946          Activities of Daily Living    BADL Assessment/Intervention  lower body dressing  -[facility] Name 06/21/21 0946          Lower Body Dressing Assessment/Training    [place] Level (Lower Body Dressing)  don;socks;supervision  -     Position (Lower Body Dressing)  edge of bed sitting  -       User Key  (r) = Recorded By, (t) = Taken By, (c) = Cosigned By    Initials Name Provider Type     Brook Weems, OTR/L Occupational Therapist        Obj/Interventions     Row Name 06/21/21 0946          Sensory Interventions    Comment, Sensory Intervention  RUE WFL light touch, LUE light touch mild impairement extended time deep touch WFL; good digit to nose eyes occluded and open, good digit to thumb  -     Row Name 06/21/21  0946          Vision Assessment/Intervention    Visual Impairment/Limitations  corrective lenses full-time hearing WFL  -BH     Row Name 06/21/21 0946          Range of Motion Comprehensive    General Range of Motion  bilateral upper extremity ROM [facility]  -BH     Row Name 06/21/21 0946          Strength Comprehensive (MMT)    Comment, General Manual Muscle Testing (MMT) Assessment  RUE  grossly 5/5; LUE  grossly 4+/5; RUE grossly 4+ to 5/5; LUE grossly shoulder and elbow 4/5  -BH     Row Name 06/21/21 0946          Balance    Balance Assessment  sitting static balance;sitting dynamic balance;standing static balance;standing dynamic balance  -     Static Sitting Balance  WFL  -     Dynamic Sitting Balance  WFL  -     Static Standing Balance  WFL  -     Dynamic Standing Balance  mild impairment  -       User Key  (r) = Recorded By, (t) = Taken By, (c) = Cosigned By    Initials Name Provider Type     Brook Weems, OTR/L Occupational Therapist        Goals/Plan     Row Name 06/21/21 0946          Transfer Goal 1 (OT)    Activity/Assistive Device (Transfer Goal 1, OT)  toilet  -     [place] Level/Cues Needed (Transfer Goal 1, OT)  independent  -     Time Frame (Transfer Goal 1, OT)  long term goal (LTG);by discharge  -     Progress/Outcome (Transfer Goal 1, OT)  goal not met  -BH     Row Name 06/21/21 0946          Bathing Goal 1 (OT)    Activity/Device (Bathing Goal 1, OT)  bathing skills, all  -     [place] Level/Cues Needed (Bathing Goal 1, OT)  set-up required;independent  -     Time Frame (Bathing Goal 1, OT)  long term goal (LTG);by discharge  -     Progress/Outcomes (Bathing Goal 1, OT)  goal not met  -BH     Row Name 06/21/21 0946          Dressing Goal 1 (OT)    Activity/Device (Dressing Goal 1, OT)  dressing skills, all  -     [place]/Cues Needed (Dressing Goal 1, OT)  set-up required;independent  -     Time Frame (Dressing Goal 1, OT)  long term goal (LTG);by  discharge  -     Progress/Outcome (Dressing Goal 1, OT)  goal not met  -[facility] Name 06/21/21 0946          ROM Goal 1 (OT)    ROM Goal 1 (OT)  Pt will engage in a 40 min functional task with 3 or less rest breaks stable vitals and no LOB.  -     Time Frame (ROM Goal 1, OT)  long term goal (LTG);by discharge  -     Progress/Outcome (ROM Goal 1, OT)  goal not met  -BH     Row Name 06/21/21 0946          Therapy Assessment/Plan (OT)    Planned Therapy Interventions (OT)  activity tolerance training;adaptive equipment training;BADL retraining;cognitive/visual perception retraining;functional balance retraining;IADL retraining;occupation/activity based interventions;ROM/therapeutic exercise;strengthening exercise;transfer/mobility retraining;passive ROM/stretching;patient/caregiver education/training;neuromuscular control/coordination retraining  -       User Key  (r) = Recorded By, (t) = Taken By, (c) = Cosigned By    Initials Name Provider Type     Brook Weems, OTR/L Occupational Therapist        Clinical Impression     Row Name 06/21/21 0946          Pain Assessment    Additional Documentation  Pain Scale: Numbers Pre/Post-Treatment (Group)  -BH     Row Name 06/21/21 0946          Pain Scale: Numbers Pre/Post-Treatment    Pretreatment Pain Rating  7/10  [facility]     Pain Location  hip  -     Pain Intervention(s)  Repositioned;Ambulation/increased activity;Distraction  -BH     Row Name 06/21/21 0946          Plan of Care Review    Plan of Care Reviewed With  patient  -     Outcome Summary  OT laura completed this date, co-eval with PT. Pt is alert and cooperative. Pt SBA for bed mobility and mobility in room. Pt independent with sit to stand t/f. No LOB noted. Pt supervision to don and doff socks. Pt RUE is slightly stronger then LUE but pt is right handed. Pt reports some decreased light touch with LUE. RUE and LUE coordination is equal. Pt may benefit from further skilled OT to reach PLOF while in  hospital but recommend to d/c home at discharge.  -     Row Name 06/21/21 0946          Therapy Assessment/Plan (OT)    Patient/Family Therapy Goal Statement (OT)  to go home  -     Rehab Potential (OT)  fair, will monitor progress closely  -     Criteria for Skilled Therapeutic Interventions Met (OT)  yes;meets criteria;skilled treatment is necessary  -     Therapy Frequency (OT)  other (see comments) 5-7 days a week  -     Predicted Duration of Therapy Intervention (OT)  until d/c or max potential  -     Row Name 06/21/21 0946          Therapy Plan Review/Discharge Plan (OT)    Anticipated Discharge Disposition (OT)  home  -     Row Name 06/21/21 0946          Vital Signs    Pre Systolic BP Rehab  109  -BH     Pre Treatment Diastolic BP  67  -BH     Intra Systolic BP Rehab  142  -BH     Intra Treatment Diastolic BP  106  -BH     Post Systolic BP Rehab  120  -BH     Post Treatment Diastolic BP  72  -BH     Pretreatment Heart Rate (beats/min)  99  -BH     Posttreatment Heart Rate (beats/min)  107  -BH     Pre SpO2 (%)  96  -BH     O2 Delivery Pre Treatment  room air  -BH     Post SpO2 (%)  98  -BH     O2 Delivery Post Treatment  room air  -BH     Pre Patient Position  Side Lying  -     Intra Patient Position  Sitting after mobility  -     Post Patient Position  Supine  -     Row Name 06/21/21 0946          Positioning and Restraints    Pre-Treatment Position  in bed  -     Post Treatment Position  bed  -BH     In Bed  supine;notified nsg;call light within reach;encouraged to call for assist  -       User Key  (r) = Recorded By, (t) = Taken By, (c) = Cosigned By    Initials Name Provider Type     Brook Weems, OTR/L Occupational Therapist        Outcome Measures     Row Name 06/21/21 0946          How much help from another is currently needed...    Putting on and taking off regular lower body clothing?  3  -BH     Bathing (including washing, rinsing, and drying)  3  -BH     Toileting  (which includes using toilet bed pan or urinal)  4  -     Putting on and taking off regular upper body clothing  4  -     Taking care of personal grooming (such as brushing teeth)  4  -     Eating meals  4  -     AM-PAC 6 Clicks Score (OT)  22  -     Row Name 06/21/21 0945          How much help from another person do you currently need...    Turning from your back to your side while in flat bed without using bedrails?  3  -CZ     Moving from lying on back to sitting on the side of a flat bed without bedrails?  4  -CZ     Moving to and from a bed to a chair (including a wheelchair)?  4  -CZ     Standing up from a chair using your arms (e.g., wheelchair, bedside chair)?  4  -CZ     Climbing 3-5 steps with a railing?  4  -CZ     To walk in hospital room?  4  -     AM-PAC 6 Clicks Score (PT)  23  -     Row Name 06/21/21 0946 06/21/21 0945       Modified [place] Scale    Pre-Stroke Modified [place] Scale  --  0 - No Symptoms at all.  -    Modified [place] Scale  1 - No significant disability despite symptoms.  Able to carry out all usual duties and activities.  -  1 - No significant disability despite symptoms.  Able to carry out all usual duties and activities.  -    Row Name 06/21/21 0946 06/21/21 0945       Functional Assessment    Outcome Measure Options  AM-PAC 6 Clicks Daily Activity (OT)  -  AM-PAC 6 Clicks Basic Mobility (PT);[place];Modified [place]  -      User Key  (r) = Recorded By, (t) = Taken By, (c) = Cosigned By    Initials Name Provider Type     Brook Weems, OTR/L Occupational Therapist    CZ Eleazar Solis, PT Physical Therapist        Occupational Therapy Education                 Title: PT OT SLP Therapies (In Progress)     Topic: Occupational Therapy (In Progress)     Point: ADL training (In Progress)     Description:   Instruct learner(s) on proper safety adaptation and remediation techniques during self care or transfers.   Instruct in proper use of assistive devices.               Learning Progress Summary           Patient Acceptance, E, NR by  at 6/21/2021 1341    Comment: Educated about OT and POC. Educated on safety throughout. Educated to call for assist.                   Point: Home exercise program (Not Started)     Description:   Instruct learner(s) on appropriate technique for monitoring, assisting and/or progressing therapeutic exercises/activities.              Learner Progress:  Not documented in this visit.          Point: Precautions (In Progress)     Description:   Instruct learner(s) on prescribed precautions during self-care and functional transfers.              Learning Progress Summary           Patient Acceptance, E, NR by  at 6/21/2021 1341    Comment: Educated about OT and POC. Educated on safety throughout. Educated to call for assist.                   Point: Body mechanics (Not Started)     Description:   Instruct learner(s) on proper positioning and spine alignment during self-care, functional mobility activities and/or exercises.              Learner Progress:  Not documented in this visit.                      User Key     Initials Effective Dates Name Provider Type Discipline     06/16/21 -  Brook Weems, OTR/L Occupational Therapist OT              OT Recommendation and Plan  Planned Therapy Interventions (OT): activity tolerance training, adaptive equipment training, BADL retraining, cognitive/visual perception retraining, functional balance retraining, IADL retraining, occupation/activity based interventions, ROM/therapeutic exercise, strengthening exercise, transfer/mobility retraining, passive ROM/stretching, patient/caregiver education/training, neuromuscular control/coordination retraining  Therapy Frequency (OT): other (see comments) (5-7 days a week)  Plan of Care Review  Plan of Care Reviewed With: patient  Outcome Summary: OT eval completed this date, co-eval with PT. Pt is alert and cooperative. Pt SBA for bed mobility and  mobility in room. Pt independent with sit to stand t/f. No LOB noted. Pt supervision to don and doff socks. Pt RUE is slightly stronger then LUE but pt is right handed. Pt reports some decreased light touch with LUE. RUE and LUE coordination is equal. Pt may benefit from further skilled OT to reach PLOF while in hospital but recommend to d/c home at discharge.     Time Calculation:   Time Calculation- OT     Row Name 06/21/21 1342             Time Calculation- OT    OT Start Time  0945  -      OT Stop Time  1027  -      OT Time Calculation (min)  42 min  -      OT Received On  06/21/21  -      OT Goal Re-Cert Due Date  07/04/21  -         Untimed Charges    OT Eval/Re-eval Minutes  42  -         Total Minutes    Untimed Charges Total Minutes  42  -       Total Minutes  42  -        User Key  (r) = Recorded By, (t) = Taken By, (c) = Cosigned By    Initials Name Provider Type     Brook Weems, OTR/L Occupational Therapist        Therapy Charges for Today     Code Description Service Date Service Provider Modifiers Qty    [id number]  OT EVAL MOD COMPLEXITY 3 6/21/2021 Brook Weems OTR/L GO 1               Brook Weems OTR/BARB  6/21/2021

## 2021-06-22 ENCOUNTER — READMISSION MANAGEMENT (OUTPATIENT)
Dept: CALL CENTER | Facility: HOSPITAL | Age: 26
End: 2021-06-22

## 2021-06-22 VITALS
SYSTOLIC BLOOD PRESSURE: 152 MMHG | HEART RATE: 94 BPM | RESPIRATION RATE: 18 BRPM | HEIGHT: 67 IN | OXYGEN SATURATION: 98 % | TEMPERATURE: 96.8 F | DIASTOLIC BLOOD PRESSURE: 80 MMHG | WEIGHT: 315 LBS | BODY MASS INDEX: 49.44 KG/M2

## 2021-06-22 LAB
ALBUMIN SERPL-MCNC: 3.8 G/DL (ref 3.5–5.2)
ALBUMIN/GLOB SERPL: 1.1 G/DL
ALP SERPL-CCNC: 86 U/L (ref 39–117)
ALT SERPL W P-5'-P-CCNC: 42 U/L (ref 1–41)
ANION GAP SERPL CALCULATED.3IONS-SCNC: 10 MMOL/L (ref 5–15)
AST SERPL-CCNC: 25 U/L (ref 1–40)
BASOPHILS # BLD AUTO: 0.05 10*3/MM3 (ref 0–0.2)
BASOPHILS NFR BLD AUTO: 0.5 % (ref 0–1.5)
BILIRUB SERPL-MCNC: 0.8 MG/DL (ref 0–1.2)
BUN SERPL-MCNC: 12 MG/DL (ref 6–20)
BUN/CREAT SERPL: 17.4 (ref 7–25)
CALCIUM SPEC-SCNC: 9.5 MG/DL (ref 8.6–10.5)
CHLORIDE SERPL-SCNC: 100 MMOL/L (ref 98–107)
CO2 SERPL-SCNC: 26 MMOL/L (ref 22–29)
CREAT SERPL-MCNC: 0.69 MG/DL (ref 0.76–1.27)
DEPRECATED RDW RBC AUTO: 44.3 FL (ref 37–54)
EOSINOPHIL # BLD AUTO: 0.41 10*3/MM3 (ref 0–0.4)
EOSINOPHIL NFR BLD AUTO: 4.2 % (ref 0.3–6.2)
ERYTHROCYTE [DISTWIDTH] IN BLOOD BY AUTOMATED COUNT: 14.7 % (ref 12.3–15.4)
GFR SERPL CREATININE-BSD FRML MDRD: 139 ML/MIN/1.73
GLOBULIN UR ELPH-MCNC: 3.5 GM/DL
GLUCOSE BLDC GLUCOMTR-MCNC: 139 MG/DL (ref 70–130)
GLUCOSE BLDC GLUCOMTR-MCNC: 164 MG/DL (ref 70–130)
GLUCOSE SERPL-MCNC: 152 MG/DL (ref 65–99)
HCT VFR BLD AUTO: 42.7 % (ref 37.5–51)
HGB BLD-MCNC: 14.4 G/DL (ref 13–17.7)
IMM GRANULOCYTES # BLD AUTO: 0.04 10*3/MM3 (ref 0–0.05)
IMM GRANULOCYTES NFR BLD AUTO: 0.4 % (ref 0–0.5)
LYMPHOCYTES # BLD AUTO: 2.86 10*3/MM3 (ref 0.7–3.1)
LYMPHOCYTES NFR BLD AUTO: 29.3 % (ref 19.6–45.3)
MCH RBC QN AUTO: 28.6 PG (ref 26.6–33)
MCHC RBC AUTO-ENTMCNC: 33.7 G/DL (ref 31.5–35.7)
MCV RBC AUTO: 84.7 FL (ref 79–97)
MONOCYTES # BLD AUTO: 0.61 10*3/MM3 (ref 0.1–0.9)
MONOCYTES NFR BLD AUTO: 6.3 % (ref 5–12)
NEUTROPHILS NFR BLD AUTO: 5.78 10*3/MM3 (ref 1.7–7)
NEUTROPHILS NFR BLD AUTO: 59.3 % (ref 42.7–76)
NRBC BLD AUTO-RTO: 0 /100 WBC (ref 0–0.2)
PLATELET # BLD AUTO: 315 10*3/MM3 (ref 140–450)
PMV BLD AUTO: 9.7 FL (ref 6–12)
POTASSIUM SERPL-SCNC: 4.1 MMOL/L (ref 3.5–5.2)
PROT SERPL-MCNC: 7.3 G/DL (ref 6–8.5)
RBC # BLD AUTO: 5.04 10*6/MM3 (ref 4.14–5.8)
SODIUM SERPL-SCNC: 136 MMOL/L (ref 136–145)
WBC # BLD AUTO: 9.75 10*3/MM3 (ref 3.4–10.8)

## 2021-06-22 PROCEDURE — 97535 SELF CARE MNGMENT TRAINING: CPT

## 2021-06-22 PROCEDURE — 99213 OFFICE O/P EST LOW 20 MIN: CPT | Performed by: NURSE PRACTITIONER

## 2021-06-22 PROCEDURE — G0378 HOSPITAL OBSERVATION PER HR: HCPCS

## 2021-06-22 PROCEDURE — 80053 COMPREHEN METABOLIC PANEL: CPT | Performed by: NURSE PRACTITIONER

## 2021-06-22 PROCEDURE — 82962 GLUCOSE BLOOD TEST: CPT

## 2021-06-22 PROCEDURE — 97116 GAIT TRAINING THERAPY: CPT

## 2021-06-22 PROCEDURE — 85025 COMPLETE CBC W/AUTO DIFF WBC: CPT | Performed by: NURSE PRACTITIONER

## 2021-06-22 PROCEDURE — 97110 THERAPEUTIC EXERCISES: CPT

## 2021-06-22 PROCEDURE — 97530 THERAPEUTIC ACTIVITIES: CPT

## 2021-06-22 RX ORDER — ASPIRIN 325 MG
325 TABLET ORAL DAILY
Qty: 30 TABLET | Refills: 3 | Status: SHIPPED | OUTPATIENT
Start: 2021-06-22 | End: 2022-02-09

## 2021-06-22 RX ORDER — LOSARTAN POTASSIUM 50 MG/1
50 TABLET ORAL
Qty: 30 TABLET | Refills: 3 | Status: SHIPPED | OUTPATIENT
Start: 2021-06-22 | End: 2022-02-09 | Stop reason: SDUPTHER

## 2021-06-22 RX ORDER — ATORVASTATIN CALCIUM 80 MG/1
80 TABLET, FILM COATED ORAL NIGHTLY
Qty: 30 TABLET | Refills: 3 | Status: SHIPPED | OUTPATIENT
Start: 2021-06-22 | End: 2022-02-09

## 2021-06-22 RX ADMIN — LOSARTAN POTASSIUM 50 MG: 50 TABLET, FILM COATED ORAL at 08:53

## 2021-06-22 RX ADMIN — METFORMIN HYDROCHLORIDE 500 MG: 500 TABLET ORAL at 08:53

## 2021-06-22 RX ADMIN — ASPIRIN 325 MG: 325 TABLET ORAL at 08:53

## 2021-06-22 NOTE — PROGRESS NOTES
Stroke Progress Note       Chief Complaint: Left-sided numbness    Subjective     HPI: Pt is a 25-yr-old right-handed white male with known diagnosis of hypertension, morbid obesity, JESUS, tobacco use, and asthma who presented with left-side numbness and weakness on face, arm, and leg. This morning he states his numbness has resolved. Strengths are equal 5/5, and denies any vision changes.       Review of Systems   HENT: Negative.    Eyes: Negative.    Respiratory: Negative.    Cardiovascular: Negative.    Gastrointestinal: Negative.    Genitourinary: Negative.    Musculoskeletal: Negative.    Skin: Negative.    Neurological: Negative.    Hematological: Negative.    Psychiatric/Behavioral: Negative.         Objective      Temp:  [96 °F (35.6 °C)-97.6 °F (36.4 °C)] 97.3 °F (36.3 °C)  Heart Rate:  [] 66  Resp:  [18] 18  BP: (112-137)/(66-89) 112/67    Neurological Exam  Mental Status  Awake, alert and oriented to person, place and time.Alert. Recent and remote memory are intact. Speech is normal. Language is fluent with no aphasia. Attention and concentration are normal.    Cranial Nerves  CN II: Visual acuity is normal. Visual fields full to confrontation.  CN III, IV, VI: Extraocular movements intact bilaterally. Normal lids and orbits bilaterally. Pupils equal round and reactive to light bilaterally.  CN V: Facial sensation is normal.  CN VII: Full and symmetric facial movement.  CN IX, X: Palate elevates symmetrically. Normal gag reflex.  CN XI: Shoulder shrug strength is normal.  CN XII: Tongue midline without atrophy or fasciculations.    Motor  Normal muscle bulk throughout. No fasciculations present. Strength is 5/5 throughout all four extremities.    Sensory  Sensation is intact to light touch, pinprick, vibration and proprioception in all four extremities.    Reflexes  Not tested..    Coordination  Finger-to-nose, rapid alternating movements and heel-to-shin normal bilaterally without  dysmetria.    Gait  Not tested..      Physical Exam  Vitals and nursing note reviewed.   Constitutional:       Appearance: Normal appearance.   HENT:      Head: Normocephalic and atraumatic.   Eyes:      General: Lids are normal.      Extraocular Movements: Extraocular movements intact.      Pupils: Pupils are equal, round, and reactive to light.   Cardiovascular:      Rate and Rhythm: Normal rate and regular rhythm.   Pulmonary:      Effort: Pulmonary effort is normal.   Musculoskeletal:         General: Normal range of motion.      Cervical back: Normal range of motion.   Neurological:      General: No focal deficit present.      Mental Status: He is alert and oriented to person, place, and time.      Coordination: Coordination is intact.      Deep Tendon Reflexes: Strength normal.   Psychiatric:         Mood and Affect: Mood normal.         Speech: Speech normal.         Results Review:    I reviewed the patient's new clinical results.    Lab Results (last 24 hours)     Procedure Component Value Units Date/Time    POC Glucose Once [[id number]]  (Abnormal) Collected: 06/22/21 0715    Specimen: Blood Updated: 06/22/21 0736     Glucose 164 mg/dL      Comment: RN NotifiedOperator: [id number] ROCHELLE RAINEY ID: [id number]       Comprehensive Metabolic Panel [[id number]]  (Abnormal) Collected: 06/22/21 0533    Specimen: Blood Updated: 06/22/21 0620     Glucose 152 mg/dL      BUN 12 mg/dL      Creatinine 0.69 mg/dL      Sodium 136 mmol/L      Potassium 4.1 mmol/L      Chloride 100 mmol/L      CO2 26.0 mmol/L      Calcium 9.5 mg/dL      Total Protein 7.3 g/dL      Albumin 3.80 g/dL      ALT (SGPT) 42 U/L      AST (SGOT) 25 U/L      Alkaline Phosphatase 86 U/L      Total Bilirubin 0.8 mg/dL      eGFR Non African Amer 139 mL/min/1.73      Globulin 3.5 gm/dL      A/G Ratio 1.1 g/dL      BUN/Creatinine Ratio 17.4     Anion Gap 10.0 mmol/L     Narrative:      GFR Normal >60  Chronic Kidney Disease <60  Kidney Failure  <15      CBC & Differential [[id number]]  (Abnormal) Collected: 06/22/21 0533    Specimen: Blood Updated: 06/22/21 0603    Narrative:      The following orders were created for panel order CBC & Differential.  Procedure                               Abnormality         Status                     ---------                               -----------         ------                     CBC Auto Differential[[id number]]        Abnormal            Final result                 Please view results for these tests on the individual orders.    CBC Auto Differential [[id number]]  (Abnormal) Collected: 06/22/21 0533    Specimen: Blood Updated: 06/22/21 0603     WBC 9.75 10*3/mm3      RBC 5.04 10*6/mm3      Hemoglobin 14.4 g/dL      Hematocrit 42.7 %      MCV 84.7 fL      MCH 28.6 pg      MCHC 33.7 g/dL      RDW 14.7 %      RDW-SD 44.3 fl      MPV 9.7 fL      Platelets 315 10*3/mm3      Neutrophil % 59.3 %      Lymphocyte % 29.3 %      Monocyte % 6.3 %      Eosinophil % 4.2 %      Basophil % 0.5 %      Immature Grans % 0.4 %      Neutrophils, Absolute 5.78 10*3/mm3      Lymphocytes, Absolute 2.86 10*3/mm3      Monocytes, Absolute 0.61 10*3/mm3      Eosinophils, Absolute 0.41 10*3/mm3      Basophils, Absolute 0.05 10*3/mm3      Immature Grans, Absolute 0.04 10*3/mm3      nRBC 0.0 /100 WBC     POC Glucose Once [[id number]]  (Abnormal) Collected: 06/21/21 1931    Specimen: Blood Updated: 06/21/21 2010     Glucose 222 mg/dL      Comment: RN NotifiedOperator: [id number] ALEX GRIFFITHAMeter ID: [id number]       POC Glucose Once [[id number]]  (Abnormal) Collected: 06/21/21 1628    Specimen: Blood Updated: 06/21/21 1717     Glucose 157 mg/dL      Comment: RN NotifiedOperator: [id number] REY Mcwilliams ID: [id number]           CT Head Without Contrast    Result Date: 6/21/2021  Negative CT head without contrast with no acute intracranial abnormalities. [id number] Electronically signed by:  Juan David Nugent MD  6/21/2021 9:26 AM CDT  Workstation: [id number]    Results for orders placed during the hospital encounter of 06/20/21    Adult Transthoracic Echo Complete W/ Cont if Necessary Per Protocol    Interpretation Summary  · Left ventricular ejection fraction appears to be 66 - 70%.  · Left ventricular diastolic function was normal.  · Estimated right ventricular systolic pressure from tricuspid regurgitation is normal (<35 mmHg).        Assessment/Plan     Assessment/Plan: Pt is a 25-yr-old right-handed white male with known diagnosis of hypertension, morbid obesity, JESUS, tobacco use, and asthma who presented with left-side numbness and weakness on face, arm, and leg. This morning he states his numbness has resolved. Strengths are equal 5/5, and denies any vision changes.   1. Left arm and leg numbness- Resolved. Possibly right thalamic stroke d/t his risk factors and family history of stroke. He cannot have MRI d/t size, repeat CT was neg, recommend outpatient open MRI at [place] then follow-up with neuro in four weeks. Start aspirin 325 mg/day and Lipitor 80 mg/day for secondary stroke prevention for now.   2. Hypertension- Not taking any home BP meds at this time d/t low BP. Currently 126/81. Instructed on daily BP monitoring and control.  3. A1C is 7.2, instructed on glucose monitoring and control to reduce stroke risk. Dietician consulted for diabetes teaching.  4. Tobacco use- States he currently smokes occasionally and dips. Instructed on the importance of tobacco cessation to reduce stroke risk.  5.Morbid obesity- Instructed on weight reduction to reduce stroke risk.  6. Activity- Continue to work with PT/OT.  7. Diet- ADA heart-healthy diet.   Case was discussed with pt, Dr. Marino, Hospitalist team, and nursing.            Patient Active Problem List   Diagnosis   • Syncope and collapse   • Weakness   • Transient ischemic attack (TIA)   • Hypertension   • Morbid obesity (CMS/HCC)   • Diabetes mellitus (CMS/HCC)   • Left-sided back  pain           Jorden Cameron, WILFREDO  06/22/21  08:31 CDT

## 2021-06-22 NOTE — THERAPY TREATMENT NOTE
Acute Care - Physical Therapy Treatment Note  [facility]     Patient Name: Venkatesh Parra  : 1995  MRN: [id number]  Today's Date: 2021           PT Assessment (last 12 hours)      PT Evaluation and Treatment     Row Name 21 1026          Physical Therapy Time and Intention    Document Type  therapy note (daily note)  -     Mode of Treatment  physical therapy;individual therapy  -     Patient Effort  good  -     Row Name 21 1026          General Information    Patient Profile Reviewed  yes  -     Row Name 21 1026          Cognition    Affect/Mental Status (Cognitive)  WFL  -     Orientation Status (Cognition)  oriented x 4  -     Personal Safety Interventions  fall prevention program maintained;gait belt;muscle strengthening facilitated;nonskid shoes/slippers when out of bed;supervised activity  -     Row Name 21 1026          Pain Scale: Numbers Pre/Post-Treatment    Pretreatment Pain Rating  0/10 - no pain  -     Posttreatment Pain Rating  0/10 - no pain  -[facility] Name 21 1026          Range of Motion Comprehensive    General Range of Motion  bilateral lower extremity ROM WFL  -[facility] Name 21 1026          Strength Comprehensive (MMT)    General Manual Muscle Testing (MMT) Assessment  other (see comments)  -     Row Name 21 1026          Bed Mobility    Bed Mobility  supine-sit;sit-supine  -     Supine-Sit [place] (Bed Mobility)  independent  -     Sit-Supine [place] (Bed Mobility)  independent  -     Assistive Device (Bed Mobility)  --  -     Comment (Bed Mobility)  HOB flat. no bed rails  -     Row Name 21 1026          Transfers    Transfers  sit-stand transfer;stand-sit transfer  -     Sit-Stand [place] (Transfers)  independent  -     Stand-Sit [place] (Transfers)  independent  -     Row Name 21 1026          Gait/Stairs (Locomotion)    Gait/Stairs Locomotion  gait/ambulation  independence;gait/ambulation assistive device;distance ambulated;gait pattern;gait deviations;maintains weight-bearing status  -     [place] Level (Gait)  independent  -JERMAINE     Distance in Feet (Gait)  380  -JERMAINE     Pattern (Gait)  step-through  -JERMAINE     Deviations/Abnormal Patterns (Gait)  base of support, wide  -JERMAINE     Negotiation (Stairs)  stairs independence;stairs assistive device;handrail location;number of steps;ascending technique;descending technique  -JERMAINE     [place] Level (Stairs)  independent  -JERMAINE     Assistive Device (Stairs)  walker, front-wheeled  -JERMAINE     Handrail Location (Stairs)  both sides  -JERMAINE     Number of Steps (Stairs)  15  -JERMAINE     Ascending Technique (Stairs)  step-to-step  -JERMAINE     Descending Technique (Stairs)  step-to-step  -JERMAINE     Row Name 06/22/21 1026          Safety Issues, Functional Mobility    Impairments Affecting Function (Mobility)  endurance/activity tolerance  -JERMAINE     Row Name 06/22/21 1026          Plan of Care Review    Plan of Care Reviewed With  patient  -     Progress  improving  -     Outcome Summary  pt responded well to PT. pt demonstrated independece w/ bed mobility, gait, and stair training. pt met 2 new goals and partially met the 3rd. pt will not need further PT at this time.   -JERMAINE     Row Name 06/22/21 1026          Vital Signs    Pre Systolic BP Rehab  149  -JERMAINE     Pre Treatment Diastolic BP  89  -JERMAINE     Post Systolic BP Rehab  163  -JERMAINE     Post Treatment Diastolic BP  96  -JERMAINE     Pretreatment Heart Rate (beats/min)  103  -JERMAINE     Posttreatment Heart Rate (beats/min)  95  -JERMAINE     Pre SpO2 (%)  98  -JERMAINE     O2 Delivery Pre Treatment  room air  -JERMAINE     Post SpO2 (%)  98  -JERMAINE     O2 Delivery Post Treatment  room air  -JERMAINE     Pre Patient Position  Supine  -JERMAINE     Post Patient Position  Supine  -JERMAINE     Row Name 06/22/21 1026          Bed Mobility Goal 1 (PT)    Activity/Assistive Device (Bed Mobility Goal 1, PT)  sit to supine/supine to sit  -JERMAINE      [place] Level/Cues Needed (Bed Mobility Goal 1, PT)  independent  -JERMAINE     Time Frame (Bed Mobility Goal 1, PT)  by discharge  -JERMAINE     Strategies/Barriers (Bed Mobility Goal 1, PT)  HOB flat, no bed rails.  -JERMAINE     Progress/Outcomes (Bed Mobility Goal 1, PT)  (S) goal met  -     Row Name 06/22/21 1026          Gait Training Goal 1 (PT)    Activity/Assistive Device (Gait Training Goal 1, PT)  gait (walking locomotion)  -JERMAINE     [place] Level (Gait Training Goal 1, PT)  independent  -JERMAINE     Distance (Gait Training Goal 1, PT)  150'x1.  -JERMAINE     Time Frame (Gait Training Goal 1, PT)  by discharge  -JERMAINE     Strategies/Barriers (Gait Training Goal 1, PT)  (S) Decrease sensation reported, LLE. goal met  -     Row Name 06/22/21 1026          Stairs Goal 1 (PT)    Activity/Assistive Device (Stairs Goal 1, PT)  descending stairs;ascending stairs  -JERMAINE     [place] Level/Cues Needed (Stairs Goal 1, PT)  independent  -JERMAINE     Number of Stairs (Stairs Goal 1, PT)  2 steps, no rails.  -JERMAINE     Time Frame (Stairs Goal 1, PT)  short term goal (STG);by discharge  -JERMAINE     Strategies/Barriers (Stairs Goal 1, PT)  Decrease sensation reported, LLE.  -JERMAINE     Progress/Outcome (Stairs Goal 1, PT)  (S) goal partially met  -     Row Name 06/22/21 1026          Positioning and Restraints    Pre-Treatment Position  in bed  -JERMAINE     Post Treatment Position  bed  -JERMAINE     In Bed  sitting EOB;call light within reach;encouraged to call for assist;exit alarm on all needs met.   -     Row Name 06/22/21 1026          Therapy Assessment/Plan (PT)    Rehab Potential (PT)  good, to achieve stated therapy goals  -     Criteria for Skilled Interventions Met (PT)  yes;skilled treatment is necessary  -       User Key  (r) = Recorded By, (t) = Taken By, (c) = Cosigned By    Initials Name Provider Type    Jaycob Hill PTA Physical Therapy Assistant        Physical Therapy Education                 Title: PT OT SLP Therapies (In  Progress)     Topic: Physical Therapy (In Progress)     Point: Mobility training (In Progress)     Learning Progress Summary           Patient Acceptance, E, NR by  at 6/22/2021 1311                   Point: Home exercise program (Not Started)     Learner Progress:  Not documented in this visit.          Point: Body mechanics (Not Started)     Learner Progress:  Not documented in this visit.          Point: Precautions (Done)     Learning Progress Summary           Patient Acceptance, E,TB, VU by  at 6/21/2021 1226    Comment: Tinetti assessed: 26/28 or low fall risk; FWW not necessary.                               User Key     Initials Effective Dates Name Provider Type Discipline     06/16/21 -  Jaycob Forbes, PTA Physical Therapy Assistant PT     06/16/21 -  Eleazar Solis, PT Physical Therapist PT              PT Recommendation and Plan  Anticipated Discharge Disposition (PT): home  Therapy Frequency (PT): 5 times/wk  Plan of Care Reviewed With: patient  Progress: improving  Outcome Summary: pt responded well to PT. pt demonstrated independece w/ bed mobility, gait, and stair training. pt met 2 new goals and partially met the 3rd. pt will not need further PT at this time.   Outcome Measures     Row Name 06/22/21 1026             How much help from another person do you currently need...    Turning from your back to your side while in flat bed without using bedrails?  4  -JERMAINE      Moving from lying on back to sitting on the side of a flat bed without bedrails?  4  -JERMAINE      Moving to and from a bed to a chair (including a wheelchair)?  4  -JERMAINE      Standing up from a chair using your arms (e.g., wheelchair, bedside chair)?  4  -JERMAINE      Climbing 3-5 steps with a railing?  4  -JERMAINE      To walk in hospital room?  4  -JERMAINE      AM-PAC 6 Clicks Score (PT)  24  -         Functional Assessment    Outcome Measure Options  AM-PAC 6 Clicks Basic Mobility (PT)  -        User Key  (r) = Recorded By, (t) = Taken  By, (c) = Cosigned By    Initials Name Provider Type    Jaycob Hill PTA Physical Therapy Assistant           Time Calculation:   PT Charges     Row Name 06/22/21 1313             Time Calculation    Start Time  1024  -JERMAINE      Stop Time  1049  -JERMAINE      Time Calculation (min)  25 min  -JERMAINE         Time Calculation- PT    Total Timed Code Minutes- PT  25 minute(s)  -JERMAINE         Timed Charges    46011 - Gait Training Minutes   10  -JERMAINE      51363 - PT Therapeutic Activity Minutes  15  -JERMAINE         Total Minutes    Timed Charges Total Minutes  25  -JERMAINE       Total Minutes  25  -JERMAINE        User Key  (r) = Recorded By, (t) = Taken By, (c) = Cosigned By    Initials Name Provider Type    Jaycob Hill PTA Physical Therapy Assistant        Therapy Charges for Today     Code Description Service Date Service Provider Modifiers Qty    41173938779 HC GAIT TRAINING EA 15 MIN 6/22/2021 Jaycob Forbes PTA GP 1    79890691123 HC PT THERAPEUTIC ACT EA 15 MIN 6/22/2021 Jaycob Forbes PTA GP 1          PT G-Codes  Outcome Measure Options: AM-PAC 6 Clicks Basic Mobility (PT)  AM-PAC 6 Clicks Score (PT): 24  AM-PAC 6 Clicks Score (OT): 22  Modified [place] Scale: 1 - No significant disability despite symptoms.  Able to carry out all usual duties and activities.  [place] Total Score: 26    Jaycob Forbes PTA  6/22/2021

## 2021-06-22 NOTE — OUTREACH NOTE
Prep Survey      Responses   [facility] patient discharged from?  [place]   Is LACE score < 7 ?  Yes   Emergency Room discharge w/ pulse ox?  No   Eligibility  [facility]   Date of Admission  06/20/21   Date of Discharge  06/22/21   Discharge Disposition  Home or Self Care   Discharge diagnosis  TIA   Does the patient have one of the following disease processes/diagnoses(primary or secondary)?  Stroke (TIA)   Does the patient have Home health ordered?  No   Is there a DME ordered?  No   Prep survey completed?  Yes          Maribel Whitt RN

## 2021-06-22 NOTE — DISCHARGE INSTR - OTHER ORDERS
Call [facility] MRI for appointment date/time. [phone]   They will try to precert the MRI with your insurance    Fax [phone]

## 2021-06-22 NOTE — DISCHARGE SUMMARY
[facility] Medicine Services  DISCHARGE SUMMARY       Date of Admission: 6/20/2021  Date of Discharge:  6/22/2021  Primary Care Physician: Provider, No Known    Presenting Problem/History of Present Illness:  Transient ischemic attack (TIA) [G45.9]  Weakness [R53.1]  Numbness on left side [R20.0]     Final Discharge Diagnoses:  Active Hospital Problems    Diagnosis    • **Left-sided back pain    • Hypertension    • Morbid obesity (CMS/HCC)    • Diabetes mellitus (CMS/[facility])        Consults:   Consults     Date and Time Order Name Status Description    6/20/2021  7:40 AM Inpatient Neurology Consult Stroke Completed     6/20/2021  5:23 AM Hospitalist (on-call MD unless specified)      6/20/2021  5:23 AM Inpatient Neurology Consult Stroke Completed     6/20/2021  3:24 AM Inpatient Neurology Consult Stroke Completed            Pertinent Test Results:   Lab Results (last 24 hours)     Procedure Component Value Units Date/Time    POC Glucose Once [[id number]]  (Abnormal) Collected: 06/22/21 1049    Specimen: Blood Updated: 06/22/21 1130     Glucose 139 mg/dL      Comment: RN NotifiedOperator: [id number] ROCHELLE RAINEYMetjulio ID: [id number]       POC Glucose Once [[id number]]  (Abnormal) Collected: 06/22/21 0715    Specimen: Blood Updated: 06/22/21 0736     Glucose 164 mg/dL      Comment: RN NotifiedOperator: [id number] ROCHELLE RAINEYMetjulio ID: [id number]       Comprehensive Metabolic Panel [[id number]]  (Abnormal) Collected: 06/22/21 0533    Specimen: Blood Updated: 06/22/21 0620     Glucose 152 mg/dL      BUN 12 mg/dL      Creatinine 0.69 mg/dL      Sodium 136 mmol/L      Potassium 4.1 mmol/L      Chloride 100 mmol/L      CO2 26.0 mmol/L      Calcium 9.5 mg/dL      Total Protein 7.3 g/dL      Albumin 3.80 g/dL      ALT (SGPT) 42 U/L      AST (SGOT) 25 U/L      Alkaline Phosphatase 86 U/L      Total Bilirubin 0.8 mg/dL      eGFR Non African Amer 139 mL/min/1.73      Globulin 3.5  gm/dL      A/G Ratio 1.1 g/dL      BUN/Creatinine Ratio 17.4     Anion Gap 10.0 mmol/L     Narrative:      GFR Normal >60  Chronic Kidney Disease <60  Kidney Failure <15      CBC & Differential [[id number]]  (Abnormal) Collected: 06/22/21 0533    Specimen: Blood Updated: 06/22/21 0603    Narrative:      The following orders were created for panel order CBC & Differential.  Procedure                               Abnormality         Status                     ---------                               -----------         ------                     CBC Auto Differential[[id number]]        Abnormal            Final result                 Please view results for these tests on the individual orders.    CBC Auto Differential [[id number]]  (Abnormal) Collected: 06/22/21 0533    Specimen: Blood Updated: 06/22/21 0603     WBC 9.75 10*3/mm3      RBC 5.04 10*6/mm3      Hemoglobin 14.4 g/dL      Hematocrit 42.7 %      MCV 84.7 fL      MCH 28.6 pg      MCHC 33.7 g/dL      RDW 14.7 %      RDW-SD 44.3 fl      MPV 9.7 fL      Platelets 315 10*3/mm3      Neutrophil % 59.3 %      Lymphocyte % 29.3 %      Monocyte % 6.3 %      Eosinophil % 4.2 %      Basophil % 0.5 %      Immature Grans % 0.4 %      Neutrophils, Absolute 5.78 10*3/mm3      Lymphocytes, Absolute 2.86 10*3/mm3      Monocytes, Absolute 0.61 10*3/mm3      Eosinophils, Absolute 0.41 10*3/mm3      Basophils, Absolute 0.05 10*3/mm3      Immature Grans, Absolute 0.04 10*3/mm3      nRBC 0.0 /100 WBC     POC Glucose Once [[id number]]  (Abnormal) Collected: 06/21/21 1931    Specimen: Blood Updated: 06/21/21 2010     Glucose 222 mg/dL      Comment: RN NotifiedOperator: [id number] ALEX REBAMeter ID: [id number]       POC Glucose Once [[id number]]  (Abnormal) Collected: 06/21/21 1628    Specimen: Blood Updated: 06/21/21 1717     Glucose 157 mg/dL      Comment: RN NotifiedOperator: [id number] REY Mcwilliams ID: [id number]           Imaging Results (Last 24 Hours)     ** No results  "found for the last 24 hours. **        Chief Complaint on Day of Discharge: No complaints    Hospital Course:  This is a 25-year-old male with past medical history of asthma, hypertension, PTSD, arthritis and JESUS that presented to [facility] on 6/20/2021 with complaints of left-sided numbness in both his upper and lower extremities.  Patient was evaluated by neurology and diagnosed with a likely right thalamic stroke due to his risk factors and family history of stroke.  Patient was unable to have an MRI due to size.  Repeat CT scanning of the head was negative.  Patient recommended to have an open MRI as an outpatient and follow-up with neurology in 4 weeks.  He was started on stroke prevention of aspirin and Lipitor.  He was started on Cozaar for blood pressure.  Hemoglobin A1c 7.20.  Metformin started.  Follow up with PCP in one week.     Condition on Discharge:  Stable.     Physical Exam on Discharge:  /80 (BP Location: Left arm, Patient Position: Sitting)   Pulse 94   Temp 96.8 °F (36 °C) (Oral)   Resp 18   Ht 170.2 cm (67.01\")   Wt (!) 204 kg (449 lb 11.8 oz)   SpO2 98%   BMI 70.42 kg/m²   Physical Exam  Constitutional:       Appearance: He is well-developed.   HENT:      Head: Normocephalic and atraumatic.   Eyes:      Pupils: Pupils are equal, round, and reactive to light.   Cardiovascular:      Rate and Rhythm: Normal rate and regular rhythm.   Pulmonary:      Effort: Pulmonary effort is normal.      Breath sounds: Normal breath sounds.   Abdominal:      General: Bowel sounds are normal.      Palpations: Abdomen is soft.   Musculoskeletal:         General: Normal range of motion.      Cervical back: Normal range of motion and neck supple.   Skin:     General: Skin is warm and dry.   Neurological:      Mental Status: He is alert and oriented to person, place, and time.   Psychiatric:         Behavior: Behavior normal.       Discharge Disposition:  Home or Self Care    Discharge " Medications:     Discharge Medications      New Medications      Instructions Start Date   aspirin 325 MG tablet   325 mg, Oral, Daily      atorvastatin 80 MG tablet  Commonly known as: LIPITOR   80 mg, Oral, Nightly      losartan 50 MG tablet  Commonly known as: COZAAR   50 mg, Oral, Every 24 Hours Scheduled      metFORMIN 500 MG tablet  Commonly known as: GLUCOPHAGE   500 mg, Oral, Daily With Breakfast   Start Date: June 23, 2021        Continue These Medications      Instructions Start Date   albuterol sulfate  (90 Base) MCG/ACT inhaler  Commonly known as: Ventolin HFA   2 puffs, Inhalation, Every 4 Hours PRN      famotidine 10 MG tablet  Commonly known as: PEPCID   10 mg, Oral, 2 Times Daily      montelukast 10 MG tablet  Commonly known as: SINGULAIR   10 mg, Oral, Nightly      ondansetron ODT 4 MG disintegrating tablet  Commonly known as: ZOFRAN-ODT   4 mg, Translingual, Every 6 Hours PRN             Discharge Diet:   Diet Instructions     Diet: Consistent Carbohydrate, Cardiac      Discharge Diet:  Consistent Carbohydrate  Cardiac             Activity at Discharge:   Activity Instructions     Activity as Tolerated            Discharge Care Plan/Instructions: As above.     Follow-up Appointment:  Additional Instructions for the Follow-ups that You Need to Schedule     MRI Brain Without Contrast   Jun 26, 2021      Results need to be sent to [facility]ILLALOB[facility] Neurology.    Release to patient: Immediate           Follow-up Information     Provider, No Known Follow up in 1 week(s).    Why: Patient follows with family practice. New DM2, HTN and possible stroke.   Contact information:  [place] [address]             [facility] OPEN MRI Follow up in 1 day(s).    Why: Neurology requests outpatient open MRI of the head.  First appointment available.   Contact information:  Levi Perkins  [facility] [address] [phone]                  This document has been electronically signed by WILFREDO Gaming on June 22, 2021 13:39 CDT        Time: Greater than 30 minutes.

## 2021-06-22 NOTE — PLAN OF CARE
Goal Outcome Evaluation:  Plan of Care Reviewed With: patient        Progress: improving  Outcome Summary: pt responded well to PT. pt demonstrated independece w/ bed mobility, gait, and stair training. pt met 2 new goals and partially met the 3rd. pt will not need further PT at this time.

## 2021-06-22 NOTE — THERAPY TREATMENT NOTE
Patient Name: Venkatesh Parra  : 1995    MRN: [id number]                              Today's Date: 2021       Admit Date: 2021    Visit Dx:     ICD-10-CM ICD-9-CM   1. Weakness  R53.1 780.79   2. Numbness on left side  R20.0 782.0   3. Transient ischemic attack (TIA)  G45.9 435.9   4. Impaired functional mobility, balance, gait, and endurance  Z74.09 V49.89   5. Impaired mobility and ADLs  Z74.09 V49.89    Z78.9    6. Morbid obesity (CMS/HCC)  E66.01 278.01   7. Syncope and collapse  R55 780.2     Patient Active Problem List   Diagnosis   • Syncope and collapse   • Weakness   • Transient ischemic attack (TIA)   • Hypertension   • Morbid obesity (CMS/HCC)   • Diabetes mellitus (CMS/HCC)   • Left-sided back pain     Past Medical History:   Diagnosis Date   • Asthma    • Carpal tunnel syndrome    • Depression    • Diabetes mellitus (CMS/HCC)    • Hypertension    • Morbid obesity (CMS/HCC)    • Osteoarthritis    • PTSD (post-traumatic stress disorder)      Past Surgical History:   Procedure Laterality Date   • CHOLECYSTECTOMY       General Information     Row Name 21          OT Time and Intention    Document Type  therapy note (daily note)  -KD     Mode of Treatment  individual therapy;occupational therapy  -KD     Row Name 21          General Information    Patient Profile Reviewed  yes  -KD     Existing Precautions/Restrictions  fall  -KD     Row Name 21          Cognition    Orientation Status (Cognition)  oriented x 4  -KD     Row Name 21          Safety Issues, Functional Mobility    Impairments Affecting Function (Mobility)  endurance/activity tolerance  -KD       User Key  (r) = Recorded By, (t) = Taken By, (c) = Cosigned By    Initials Name Provider Type    KD Shanna Ramos COTA/L Occupational Therapy Assistant          Mobility/ADL's     Row Name 21          Bed Mobility    Bed Mobility  supine-sit;sit-supine  -KD     Supine-Sit  [place] (Bed Mobility)  independent  -     Sit-Supine [place] (Bed Mobility)  independent  -     Assistive Device (Bed Mobility)  bed rails;head of bed elevated  -     Row Name 06/22/21 0913          Transfers    Sit-Stand [place] (Transfers)  independent  -     Row Name 06/22/21 0913          Activities of Daily Living    BADL Assessment/Intervention  upper body dressing;lower body dressing;bathing;grooming  -     Row Name 06/22/21 0913          Lower Body Dressing Assessment/Training    [place] Level (Lower Body Dressing)  don;socks;independent  -     Position (Lower Body Dressing)  edge of bed sitting  -     Row Name 06/22/21 0913          Upper Body Dressing Assessment/Training    [place] Level (Upper Body Dressing)  upper body dressing skills;doff;don;independent  -KD     Position (Upper Body Dressing)  edge of bed sitting  -     Row Name 06/22/21 0913          Bathing Assessment/Intervention    Assistive Devices (Bathing)  bath mitt  -     Row Name 06/22/21 0913          Grooming Assessment/Training    [place] Level (Grooming)  grooming skills;hair care, combing/brushing;oral care regimen;wash face, hands;independent  -     Position (Grooming)  sink side  -       User Key  (r) = Recorded By, (t) = Taken By, (c) = Cosigned By    Initials Name Provider Type     Shanna Ramos COTA/L Occupational Therapy Assistant        Obj/Interventions     Row Name 06/22/21 0913          Shoulder (Therapeutic Exercise)    Shoulder (Therapeutic Exercise)  AROM (active range of motion)  -     Shoulder AROM (Therapeutic Exercise)  bilateral;flexion;extension;aBduction;aDduction;internal rotation;external rotation  -     Row Name 06/22/21 0913          Elbow/Forearm (Therapeutic Exercise)    Elbow/Forearm (Therapeutic Exercise)  AROM (active range of motion)  -     Elbow/Forearm AROM (Therapeutic Exercise)  bilateral;flexion;extension;supination;pronation  -formerly [facility]  Name 06/22/21 0913          Hand (Therapeutic Exercise)    Hand (Therapeutic Exercise)  AROM (active range of motion)  -KD     Hand AROM/AAROM (Therapeutic Exercise)  bilateral;finger flexion;finger extension  -KD     Row Name 06/22/21 0913          Therapeutic Exercise    Therapeutic Exercise  shoulder;elbow/forearm;wrist;hand  -KD       User Key  (r) = Recorded By, (t) = Taken By, (c) = Cosigned By    Initials Name Provider Type    Shanna Hoyos COTA/L Occupational Therapy Assistant        Goals/Plan     Row Name 06/22/21 0913          Transfer Goal 1 (OT)    Activity/Assistive Device (Transfer Goal 1, OT)  toilet  -KD     [place] Level/Cues Needed (Transfer Goal 1, OT)  independent  -KD     Time Frame (Transfer Goal 1, OT)  long term goal (LTG);by discharge  -KD     Progress/Outcome (Transfer Goal 1, OT)  goal not met  -KD     Row Name 06/22/21 0913          Bathing Goal 1 (OT)    Activity/Device (Bathing Goal 1, OT)  bathing skills, all  -KD     [place] Level/Cues Needed (Bathing Goal 1, OT)  set-up required;independent  -KD     Time Frame (Bathing Goal 1, OT)  long term goal (LTG);by discharge  -KD     Progress/Outcomes (Bathing Goal 1, OT)  goal not met  -KD     Row Name 06/22/21 0913          Dressing Goal 1 (OT)    Activity/Device (Dressing Goal 1, OT)  dressing skills, all  -KD     Tok/Cues Needed (Dressing Goal 1, OT)  set-up required;independent  -KD     Time Frame (Dressing Goal 1, OT)  long term goal (LTG);by discharge  -KD     Progress/Outcome (Dressing Goal 1, OT)  goal met  -KD     Row Name 06/22/21 0913          ROM Goal 1 (OT)    ROM Goal 1 (OT)  Pt will engage in a 40 min functional task with 3 or less rest breaks stable vitals and no LOB.  -KD     Time Frame (ROM Goal 1, OT)  long term goal (LTG);by discharge  -KD     Progress/Outcome (ROM Goal 1, OT)  goal not met  -KD       User Key  (r) = Recorded By, (t) = Taken By, (c) = Cosigned By    Initials Name Provider Type     Shanna Hoyos COTA/L Occupational Therapy Assistant        Clinical Impression     Row Name 06/22/21 0913          Pain Scale: Numbers Pre/Post-Treatment    Pretreatment Pain Rating  0/10 - no pain  -KD     Posttreatment Pain Rating  0/10 - no pain  -KD     Row Name 06/22/21 0913          Plan of Care Review    Plan of Care Reviewed With  patient  -KD     Progress  improving  -KD     Row Name 06/22/21 0913          Therapy Assessment/Plan (OT)    Therapy Frequency (OT)  other (see comments) 5-7 days a week  -KD     Row Name 06/22/21 0913          Therapy Plan Review/Discharge Plan (OT)    Anticipated Discharge Disposition (OT)  home  -KD     Row Name 06/22/21 0913          Vital Signs    Pre Systolic BP Rehab  112  -KD     Pre Treatment Diastolic BP  67  -KD     Pretreatment Heart Rate (beats/min)  93  -KD     Pre SpO2 (%)  96  -KD     O2 Delivery Pre Treatment  room air  -KD     Pre Patient Position  Supine  -KD     Intra Patient Position  Standing  -KD     Post Patient Position  Sitting  -KD     Row Name 06/22/21 0913          Positioning and Restraints    Post Treatment Position  bed  -KD     In Bed  sitting EOB;call light within reach;encouraged to call for assist  -KD       User Key  (r) = Recorded By, (t) = Taken By, (c) = Cosigned By    Initials Name Provider Type    Shanna Hoyos COTA/L Occupational Therapy Assistant        Outcome Measures     Row Name 06/22/21 0913          How much help from another is currently needed...    Putting on and taking off regular lower body clothing?  4  -KD     Bathing (including washing, rinsing, and drying)  4  -KD     Toileting (which includes using toilet bed pan or urinal)  4  -KD     Putting on and taking off regular upper body clothing  4  -KD     Taking care of personal grooming (such as brushing teeth)  4  -KD     Eating meals  4  -KD     AM-PAC 6 Clicks Score (OT)  24  -KD     Row Name 06/22/21 1026          How much help from another person do you  currently need...    Turning from your back to your side while in flat bed without using bedrails?  4  -JERMAINE     Moving from lying on back to sitting on the side of a flat bed without bedrails?  4  -JERMAINE     Moving to and from a bed to a chair (including a wheelchair)?  4  -JERMAINE     Standing up from a chair using your arms (e.g., wheelchair, bedside chair)?  4  -JERMAINE     Climbing 3-5 steps with a railing?  4  -JERMAINE     To walk in hospital room?  4  -JERMAINE     AM-PAC 6 Clicks Score (PT)  24  -JERMAINE     Row Name 06/22/21 1026          Functional Assessment    Outcome Measure Options  AM-PAC 6 Clicks Basic Mobility (PT)  -JERMAINE       User Key  (r) = Recorded By, (t) = Taken By, (c) = Cosigned By    Initials Name Provider Type    JERMAINE Jaycob Forbes, PTA Physical Therapy Assistant    Shanna Hoyos, DONNA/L Occupational Therapy Assistant        Occupational Therapy Education                 Title: PT OT SLP Therapies (In Progress)     Topic: Occupational Therapy (In Progress)     Point: ADL training (In Progress)     Description:   Instruct learner(s) on proper safety adaptation and remediation techniques during self care or transfers.   Instruct in proper use of assistive devices.              Learning Progress Summary           Patient Acceptance, E, NR by  at 6/21/2021 1341    Comment: Educated about OT and POC. Educated on safety throughout. Educated to call for assist.                   Point: Home exercise program (Not Started)     Description:   Instruct learner(s) on appropriate technique for monitoring, assisting and/or progressing therapeutic exercises/activities.              Learner Progress:  Not documented in this visit.          Point: Precautions (In Progress)     Description:   Instruct learner(s) on prescribed precautions during self-care and functional transfers.              Learning Progress Summary           Patient Acceptance, E, NR by  at 6/21/2021 1341    Comment: Educated about OT and POC. Educated on  safety throughout. Educated to call for assist.                   Point: Body mechanics (Not Started)     Description:   Instruct learner(s) on proper positioning and spine alignment during self-care, functional mobility activities and/or exercises.              Learner Progress:  Not documented in this visit.                      User Key     Initials Effective Dates Name Provider Type [facility] 06/16/21 -  Brook Weems OTR/L Occupational Therapist OT              OT Recommendation and Plan  Therapy Frequency (OT): other (see comments) (5-7 days a week)  Plan of Care Review  Plan of Care Reviewed With: patient  Progress: improving     Time Calculation:   Time Calculation- OT     Row Name 06/22/21 1313 06/22/21 0913          Time Calculation- OT    OT Start Time  --  0913  -KD     OT Stop Time  --  0951  -KD     OT Time Calculation (min)  --  38 min  -KD     Total Timed Code Minutes- OT  --  38 minute(s)  -KD     OT Received On  --  06/22/21  -KD        Timed Charges    43744 - OT Therapeutic Exercise Minutes  --  15  -KD     06759 - Gait Training Minutes   10  -JERMAINE  --     77422 - OT Self Care/Mgmt Minutes  --  23  -KD        Total Minutes    Timed Charges Total Minutes  10  -JERMAINE  38  -KD      Total Minutes  10  -JERMAINE  38  -KD       User Key  (r) = Recorded By, (t) = Taken By, (c) = Cosigned By    Initials Name Provider Type    JERMAINE Jaycob Forbes, PTA Physical Therapy Assistant    KD Shanna Ramos THOMPSON/L Occupational Therapy Assistant        Therapy Charges for Today     Code Description Service Date Service Provider Modifiers Qty    64510281300 HC OT SELF CARE/MGMT/TRAIN EA 15 MIN 6/22/2021 Shanna Ramos COTA/L GO 2    62088296427 HC OT THER PROC EA 15 MIN 6/22/2021 Shanna Ramos COTA/L GO 1               ELLIS Mcconnell  6/22/2021

## 2021-06-22 NOTE — DISCHARGE PLACEMENT REQUEST
"Melina Parra (25 y.o. Male)     Date of Birth Social Security Number Address Home Phone MRN    1995  [address]  [facility] [address] [phone] [id number]    Latter day Marital Status          Laci Single       Admission Date Admission Type Admitting Provider Attending Provider Department, Room/Bed    21 Emergency Fernando Chance MD Odeh, Osayawe N, MD [address], 402/2    Discharge Date Discharge Disposition Discharge Destination                       Attending Provider: Fernando Chance MD    Allergies: Amoxicillin, Penicillins    Isolation: None   Infection: None   Code Status: CPR    Ht: 170.2 cm (67.01\")   Wt: 204 kg (449 lb 11.8 oz)    Admission Cmt: None   Principal Problem: Left-sided back pain [M54.9]                 Active Insurance as of 2021     Primary Coverage     Payor Plan Insurance Group Employer/Plan Group    [facility] MEDICAID [id number]     Payor Plan Address Payor Plan Phone Number Payor Plan Fax Number Effective Dates    [address] [address] [phone]  2019 - None Entered    [place] [address]       Subscriber Name Subscriber Birth Date Member ID       MELINA PARRA 1995 [id number]                 Emergency Contacts      (Rel.) Home Phone Work Phone Mobile Phone    DEMARCUS PARRA (Mother) [phone] -- [phone]    JOSE GIVENS (Friend) [phone] -- [phone]    AGUSTÍN GIVENS (Friend) [phone] -- [phone]        [address] [address]  Phone:  [phone]  Fax:          Patient:     Melina Parra MRN:  [id number]   [address]  [facility] [address] :  1995  SSN:    Phone: [phone] Sex:  M      INSURANCE PAYOR PLAN GROUP # SUBSCRIBER ID   Primary:    [facility] [id number]   Admitting Diagnosis: Transient ischemic attack (TIA) [G45.9]  Order Date:  " 2021         Procedure: MRI Brain Without Contrast       (Order ID: [id number])     Diagnosis:  Weakness (R53.1 [ICD-10-CM] 780.79 [ICD-9-CM])  Numbness on left side (R20.0 [ICD-10-CM] 782.0 [ICD-9-CM])  Transient ischemic attack (TIA) (G45.9 [ICD-10-CM] 435.9 [ICD-9-CM])  Impaired functional mobility, balance, gait, and endurance (Z74.09 [ICD-10-CM] V49.89 [ICD-9-CM])  Impaired mobility and ADLs (Z74.09,Z78.9 [ICD-10-CM] V49.89 [ICD-9-CM])  Morbid obesity (CMS/HCC) (E66.01 [ICD-10-CM] 278.01 [ICD-9-CM])  Syncope and collapse (R55 [ICD-10-CM] 780.2 [ICD-9-CM])   Accession#:   Priority:  Routine             Expected Date:  6/26/2021  Expiration Date:  6/21/2022     Order Comments: Scheduling Instructions: Results need to be sent to [facility]ILLAL[facility] Neurology.  Release to patient: Immediate     Indications:  Numbness or tingling, paresthesia (Ped 0-18y)  Indication comments:        Authorizing Provider:Elba Earl APRN  Authorizing Provider's NPI: [id number]  Order Entered By: Elba Earl APRN 6/21/2021  4:26 PM     Electronically signed by: Elba Earl APRN 6/21/2021  4:26 PM            Emergency Contact Information     Name Relation Home Work Mobile    DEMARCUS PARRA Mother [phone]    JOSE GIVENS Friend [phone]    AGUSTÍN GIVENS Friend [phone]          Insurance Information                [facility] MEDICAID Phone: [phone]    Subscriber: Venkatesh Parra Subscriber#: [id number]    Group#: [id number] Precert#:

## 2021-06-22 NOTE — PLAN OF CARE
Goal Outcome Evaluation:  Plan of Care Reviewed With: patient        Progress: improving  Outcome Summary: VSS, sleeping well, no further complaints at this time

## 2021-06-23 ENCOUNTER — TRANSITIONAL CARE MANAGEMENT TELEPHONE ENCOUNTER (OUTPATIENT)
Dept: CALL CENTER | Facility: HOSPITAL | Age: 26
End: 2021-06-23

## 2021-06-23 NOTE — OUTREACH NOTE
Call Center TCM Note      Responses   [facility] patient discharged from?  [place]   Does the patient have one of the following disease processes/diagnoses(primary or secondary)?  Stroke (TIA)   TCM attempt successful?  Yes [Jose J Mcqueen, and Itz on verbal release ]   Call start time  1141   Call end time  1143   Discharge diagnosis  TIA   Is patient permission given to speak with other caregiver?  Yes   List who call center can speak with  Itz Alvarez friend    Person spoke with today (if not patient) and relationship  Itz ku    Medication comments  Itz unsure if pt has picked up medications   Does the patient have a primary care provider?   Yes   Does the patient have an appointment with their PCP within 7 days of discharge?  Greater than 7 days   Comments regarding PCP  Hosp dc [facility] apt on 7-1-21 with PCP    What is preventing the patient from scheduling follow up appointments within 7 days of discharge?  Earlier appointment not available   Nursing Interventions  Verified appointment date/time/provider   Psychosocial issues?  No   Did the patient receive a copy of their discharge instructions?  Yes   Nursing interventions  Reviewed instructions with patient   What is the patient's perception of their health status since discharge?  Improving   Nursing interventions  Nurse provided patient education   Is the patient able to teach back FAST for Stroke?  Yes   Is the patient/caregiver able to teach back the risk factors for a stroke?  High blood pressure-goal below 120/80, Smoking   Is the patient/caregiver able to teach back signs and symptoms related to disease process for when to call PCP?  Yes   Is the patient/caregiver able to teach back signs and symptoms related to disease process for when to call 911?  Yes   If the patient is a current smoker, are they able to teach back resources for cessation?  Not a smoker   Is the patient/caregiver able to teach back the hierarchy of who to call/visit  for symptoms/problems? PCP, Specialist, Home health nurse, Urgent Care, ED, 911  Yes   TCM call completed?  Yes          Oriana Montana RN    6/23/2021, 11:44 EDT

## 2021-07-01 ENCOUNTER — READMISSION MANAGEMENT (OUTPATIENT)
Dept: CALL CENTER | Facility: HOSPITAL | Age: 26
End: 2021-07-01

## 2021-07-01 NOTE — OUTREACH NOTE
Stroke Week 2 Survey      Responses   [facility] patient discharged from?  [place]   Does the patient have one of the following disease processes/diagnoses(primary or secondary)?  Stroke (TIA)   Week 2 attempt successful?  No   Unsuccessful attempts  Attempt 1          Gabo Gautam RN

## 2021-07-05 ENCOUNTER — READMISSION MANAGEMENT (OUTPATIENT)
Dept: CALL CENTER | Facility: HOSPITAL | Age: 26
End: 2021-07-05

## 2021-07-05 NOTE — OUTREACH NOTE
Stroke Week 2 Survey      Responses   [facility] [place] patient discharged from?  [place]   Does the patient have one of the following disease processes/diagnoses(primary or secondary)?  Stroke (TIA)   Week 2 attempt successful?  No   Unsuccessful attempts  Attempt 2          Chantelle Arriaza RN

## 2021-07-14 ENCOUNTER — READMISSION MANAGEMENT (OUTPATIENT)
Dept: CALL CENTER | Facility: HOSPITAL | Age: 26
End: 2021-07-14

## 2021-07-14 NOTE — OUTREACH NOTE
Stroke Week 3 Survey      Responses   [facility] patient discharged from?  [place]   Does the patient have one of the following disease processes/diagnoses(primary or secondary)?  Stroke (TIA)   Week 3 attempt successful?  No   Unsuccessful attempts  Attempt 1          Breonna Cameron RN

## 2021-07-19 ENCOUNTER — READMISSION MANAGEMENT (OUTPATIENT)
Dept: CALL CENTER | Facility: HOSPITAL | Age: 26
End: 2021-07-19

## 2021-07-19 NOTE — OUTREACH NOTE
"Stroke Week 3 Survey      Responses   [facility] patient discharged from?  [place]   Does the patient have one of the following disease processes/diagnoses(primary or secondary)?  Stroke (TIA)   Week 3 attempt successful?  Yes   Call start time  0943   Call end time  0946   Discharge diagnosis  TIA   Meds reviewed with patient/caregiver?  Yes   Is the patient taking all medications as directed (includes completed medication regime)?  Yes   Medication comments  Medication is doing ok pt supposes    Has the patient kept scheduled appointments due by today?  N/A   Comments  Have a couple of doctor apts that are coming up soon   Does the patient require any assistance with activities of daily living such as eating, bathing, dressing, walking, etc.?  No   Does the patient have any residual symptoms from stroke/TIA?  No   Does the patient understand the diet ordered at discharge?  Yes   What is the patient's perception of their health status since discharge?  New symptoms unrelated to diagnosis [\"Dealing with pink eye and other issues\" ]   Is the patient able to teach back FAST for Stroke?  Yes   Is the patient/caregiver able to teach back the risk factors for a stroke?  High blood pressure-goal below 120/80   Week 3 call completed?  Yes          Oriana Montana RN  "

## 2021-07-27 ENCOUNTER — READMISSION MANAGEMENT (OUTPATIENT)
Dept: CALL CENTER | Facility: HOSPITAL | Age: 26
End: 2021-07-27

## 2021-07-27 NOTE — OUTREACH NOTE
Stroke Week 4 Survey      Responses   [facility]?  [place]   Does the patient have one of the following disease processes/diagnoses(primary or secondary)?  Stroke (TIA)   Week 4 attempt successful?  No          Mehnaz Youssef RN

## 2021-09-28 ENCOUNTER — TELEPHONE (OUTPATIENT)
Dept: BARIATRICS/WEIGHT MGMT | Facility: CLINIC | Age: 26
End: 2021-09-28

## 2022-01-21 ENCOUNTER — HOSPITAL ENCOUNTER (EMERGENCY)
Facility: HOSPITAL | Age: 27
Discharge: HOME | End: 2022-01-21
Attending: FAMILY MEDICINE | Admitting: FAMILY MEDICINE

## 2022-01-21 VITALS
DIASTOLIC BLOOD PRESSURE: 80 MMHG | OXYGEN SATURATION: 98 % | BODY MASS INDEX: 49.44 KG/M2 | RESPIRATION RATE: 20 BRPM | HEIGHT: 67 IN | SYSTOLIC BLOOD PRESSURE: 130 MMHG | HEART RATE: 80 BPM | WEIGHT: 315 LBS | TEMPERATURE: 97.4 F

## 2022-01-21 DIAGNOSIS — R73.9 HYPERGLYCEMIA: Primary | ICD-10-CM

## 2022-01-21 LAB
ALBUMIN SERPL-MCNC: 4.3 G/DL (ref 3.5–5.2)
ALBUMIN/GLOB SERPL: 1.5 G/DL
ALP SERPL-CCNC: 97 U/L (ref 39–117)
ALT SERPL W P-5'-P-CCNC: 39 U/L (ref 1–41)
ANION GAP SERPL CALCULATED.3IONS-SCNC: 10 MMOL/L (ref 5–15)
AST SERPL-CCNC: 16 U/L (ref 1–40)
BACTERIA UR QL AUTO: ABNORMAL /HPF
BASOPHILS # BLD AUTO: 0.06 10*3/MM3 (ref 0–0.2)
BASOPHILS NFR BLD AUTO: 0.6 % (ref 0–1.5)
BILIRUB SERPL-MCNC: 0.5 MG/DL (ref 0–1.2)
BILIRUB UR QL STRIP: NEGATIVE
BUN SERPL-MCNC: 11 MG/DL (ref 6–20)
BUN/CREAT SERPL: 16.4 (ref 7–25)
CALCIUM SPEC-SCNC: 9.6 MG/DL (ref 8.6–10.5)
CHLORIDE SERPL-SCNC: 99 MMOL/L (ref 98–107)
CLARITY UR: CLEAR
CO2 SERPL-SCNC: 27 MMOL/L (ref 22–29)
COLOR UR: YELLOW
CREAT SERPL-MCNC: 0.67 MG/DL (ref 0.76–1.27)
DEPRECATED RDW RBC AUTO: 40.5 FL (ref 37–54)
EOSINOPHIL # BLD AUTO: 0.38 10*3/MM3 (ref 0–0.4)
EOSINOPHIL NFR BLD AUTO: 3.6 % (ref 0.3–6.2)
ERYTHROCYTE [DISTWIDTH] IN BLOOD BY AUTOMATED COUNT: 13.9 % (ref 12.3–15.4)
GFR SERPL CREATININE-BSD FRML MDRD: 142 ML/MIN/1.73
GLOBULIN UR ELPH-MCNC: 2.9 GM/DL
GLUCOSE SERPL-MCNC: 421 MG/DL (ref 65–99)
GLUCOSE UR STRIP-MCNC: ABNORMAL MG/DL
HBA1C MFR BLD: 12.7 % (ref 4.8–5.6)
HCT VFR BLD AUTO: 43.8 % (ref 37.5–51)
HGB BLD-MCNC: 14.6 G/DL (ref 13–17.7)
HGB UR QL STRIP.AUTO: ABNORMAL
HOLD SPECIMEN: NORMAL
HOLD SPECIMEN: NORMAL
HYALINE CASTS UR QL AUTO: ABNORMAL /LPF
IMM GRANULOCYTES # BLD AUTO: 0.05 10*3/MM3 (ref 0–0.05)
IMM GRANULOCYTES NFR BLD AUTO: 0.5 % (ref 0–0.5)
KETONES UR QL STRIP: NEGATIVE
LEUKOCYTE ESTERASE UR QL STRIP.AUTO: ABNORMAL
LIPASE SERPL-CCNC: 42 U/L (ref 13–60)
LYMPHOCYTES # BLD AUTO: 2.84 10*3/MM3 (ref 0.7–3.1)
LYMPHOCYTES NFR BLD AUTO: 26.6 % (ref 19.6–45.3)
MCH RBC QN AUTO: 27.3 PG (ref 26.6–33)
MCHC RBC AUTO-ENTMCNC: 33.3 G/DL (ref 31.5–35.7)
MCV RBC AUTO: 81.9 FL (ref 79–97)
MONOCYTES # BLD AUTO: 0.62 10*3/MM3 (ref 0.1–0.9)
MONOCYTES NFR BLD AUTO: 5.8 % (ref 5–12)
NEUTROPHILS NFR BLD AUTO: 6.72 10*3/MM3 (ref 1.7–7)
NEUTROPHILS NFR BLD AUTO: 62.9 % (ref 42.7–76)
NITRITE UR QL STRIP: NEGATIVE
NRBC BLD AUTO-RTO: 0 /100 WBC (ref 0–0.2)
PH UR STRIP.AUTO: <=5 [PH] (ref 5–9)
PLATELET # BLD AUTO: 289 10*3/MM3 (ref 140–450)
PMV BLD AUTO: 10.1 FL (ref 6–12)
POTASSIUM SERPL-SCNC: 4.1 MMOL/L (ref 3.5–5.2)
PROT SERPL-MCNC: 7.2 G/DL (ref 6–8.5)
PROT UR QL STRIP: NEGATIVE
RBC # BLD AUTO: 5.35 10*6/MM3 (ref 4.14–5.8)
RBC # UR STRIP: ABNORMAL /HPF
REF LAB TEST METHOD: ABNORMAL
SODIUM SERPL-SCNC: 136 MMOL/L (ref 136–145)
SP GR UR STRIP: 1.04 (ref 1–1.03)
SQUAMOUS #/AREA URNS HPF: ABNORMAL /HPF
UROBILINOGEN UR QL STRIP: ABNORMAL
WBC # UR STRIP: ABNORMAL /HPF
WBC NRBC COR # BLD: 10.67 10*3/MM3 (ref 3.4–10.8)
WHOLE BLOOD HOLD SPECIMEN: NORMAL
WHOLE BLOOD HOLD SPECIMEN: NORMAL

## 2022-01-21 PROCEDURE — 83690 ASSAY OF LIPASE: CPT | Performed by: FAMILY MEDICINE

## 2022-01-21 PROCEDURE — 99283 EMERGENCY DEPT VISIT LOW MDM: CPT

## 2022-01-21 PROCEDURE — 80053 COMPREHEN METABOLIC PANEL: CPT | Performed by: FAMILY MEDICINE

## 2022-01-21 PROCEDURE — 85025 COMPLETE CBC W/AUTO DIFF WBC: CPT | Performed by: FAMILY MEDICINE

## 2022-01-21 PROCEDURE — 93005 ELECTROCARDIOGRAM TRACING: CPT | Performed by: FAMILY MEDICINE

## 2022-01-21 PROCEDURE — 93005 ELECTROCARDIOGRAM TRACING: CPT

## 2022-01-21 PROCEDURE — 93010 ELECTROCARDIOGRAM REPORT: CPT | Performed by: INTERNAL MEDICINE

## 2022-01-21 PROCEDURE — 83036 HEMOGLOBIN GLYCOSYLATED A1C: CPT | Performed by: FAMILY MEDICINE

## 2022-01-21 PROCEDURE — 96360 HYDRATION IV INFUSION INIT: CPT

## 2022-01-21 PROCEDURE — 81001 URINALYSIS AUTO W/SCOPE: CPT | Performed by: FAMILY MEDICINE

## 2022-01-21 PROCEDURE — 63710000001 INSULIN REGULAR HUMAN PER 5 UNITS: Performed by: FAMILY MEDICINE

## 2022-01-21 RX ORDER — SODIUM CHLORIDE 0.9 % (FLUSH) 0.9 %
10 SYRINGE (ML) INJECTION AS NEEDED
Status: DISCONTINUED | OUTPATIENT
Start: 2022-01-21 | End: 2022-01-21 | Stop reason: HOSPADM

## 2022-01-21 RX ADMIN — SODIUM CHLORIDE 1000 ML: 9 INJECTION, SOLUTION INTRAVENOUS at 12:38

## 2022-01-21 RX ADMIN — SODIUM CHLORIDE 1000 ML: 9 INJECTION, SOLUTION INTRAVENOUS at 11:26

## 2022-01-21 RX ADMIN — HUMAN INSULIN 10 UNITS: 100 INJECTION, SOLUTION SUBCUTANEOUS at 12:38

## 2022-01-21 NOTE — ED PROVIDER NOTES
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 Complains of some chest pain and R sided cramping in R arm & leg. States he is unable to afford any medications for his chronic conditions. Has had a cough and congestion for 3 days. Denies any leg swelling, dysuria, vomiting or diarrhea.     Review of Systems   Constitutional: Positive for chills and diaphoresis.   HENT: Positive for congestion.    Respiratory: Positive for cough and shortness of breath.    Cardiovascular: Positive for chest pain. Negative for leg swelling.   Gastrointestinal: Negative for abdominal distention and abdominal pain.   Genitourinary: Positive for urgency. Negative for dysuria.   Neurological: Positive for light-headedness.   Psychiatric/Behavioral: Negative for agitation and behavioral problems.       Past Medical History:   Diagnosis Date   • Asthma    • Carpal tunnel syndrome    • Depression    • Diabetes mellitus (HCC)    • Hypertension    • Morbid obesity (HCC)    • Osteoarthritis    • PTSD (post-traumatic stress disorder)        Allergies   Allergen Reactions   • Lorcet [Hydrocodone-Acetaminophen] Mental Status Change   • Amoxicillin Rash   • Penicillins Rash     redness       Past Surgical History:   Procedure Laterality Date   • CHOLECYSTECTOMY         Family History   Problem Relation Age of Onset   • Hypertension Father        Social History     Socioeconomic History   • Marital status: Single   Tobacco Use   • Smoking status: Former Smoker     Packs/day: 1.00     Types: Cigarettes   • Smokeless tobacco: Current User     Types: Chew   Substance and Sexual Activity   • Alcohol use: Not Currently   • Drug use: Never   • Sexual activity: Not Currently       /81   Pulse 82   Temp 97.4 °F (36.3 °C) (Infrared)   Resp 20   Ht  "170.2 cm (67\")   Wt (!) 203 kg (448 lb)   SpO2 98%   BMI 70.17 kg/m²     Objective   Physical Exam  Constitutional:       Appearance: Normal appearance. He is obese.   HENT:      Head: Normocephalic and atraumatic.      Right Ear: Tympanic membrane normal.      Left Ear: Tympanic membrane normal.      Nose: Nose normal.      Mouth/Throat:      Mouth: Mucous membranes are moist.      Pharynx: Oropharynx is clear.   Eyes:      Extraocular Movements: Extraocular movements intact.      Pupils: Pupils are equal, round, and reactive to light.   Cardiovascular:      Rate and Rhythm: Normal rate and regular rhythm.      Pulses: Normal pulses.      Heart sounds: Normal heart sounds.   Pulmonary:      Effort: Pulmonary effort is normal.      Breath sounds: No stridor. No wheezing or rales.   Abdominal:      General: Bowel sounds are normal.      Palpations: Abdomen is soft. There is no mass.      Tenderness: There is no abdominal tenderness. There is no guarding.   Musculoskeletal:         General: Normal range of motion.      Cervical back: Normal range of motion and neck supple.   Skin:     General: Skin is warm and dry.      Capillary Refill: Capillary refill takes less than 2 seconds.   Neurological:      General: No focal deficit present.      Mental Status: He is alert and oriented to person, place, and time.      Cranial Nerves: No cranial nerve deficit.      Sensory: No sensory deficit.      Motor: No weakness.         Procedures           ED Course  ED Course as of 01/21/22 1434   Fri Jan 21, 2022   1434 Results were discussed with patient.  Told to follow-up with her primary care in 3 days.  Come back to the ER symptoms get worse. [MO]      ED Course User Index  [MO] Antonio Ye MD           Labs Reviewed   COMPREHENSIVE METABOLIC PANEL - Abnormal; Notable for the following components:       Result Value    Glucose 421 (*)     Creatinine 0.67 (*)     All other components within normal limits    " Narrative:     GFR Normal >60  Chronic Kidney Disease <60  Kidney Failure <15     URINALYSIS W/ MICROSCOPIC IF INDICATED (NO CULTURE) - Abnormal; Notable for the following components:    Specific Gravity, UA 1.036 (*)     Glucose, UA >=1000 mg/dL (3+) (*)     Blood, UA Trace (*)     Leuk Esterase, UA Trace (*)     All other components within normal limits   HEMOGLOBIN A1C - Abnormal; Notable for the following components:    Hemoglobin A1C 12.70 (*)     All other components within normal limits    Narrative:     Hemoglobin A1C Ranges:    Increased Risk for Diabetes  5.7% to 6.4%  Diabetes                     >= 6.5%  Diabetic Goal                < 7.0%   URINALYSIS, MICROSCOPIC ONLY - Abnormal; Notable for the following components:    RBC, UA 0-2 (*)     WBC, UA 13-20 (*)     All other components within normal limits   LIPASE - Normal   CBC WITH AUTO DIFFERENTIAL - Normal   RAINBOW DRAW    Narrative:     The following orders were created for panel order [place] Draw.  Procedure                               Abnormality         Status                     ---------                               -----------         ------                     Green Top (Gel)[[id number]]                                  Final result               Lavender Top[[id number]]                                     Final result               Gold Top - SST[[id number]]                                   Final result               Light Blue Top[[id number]]                                   Final result                 Please view results for these tests on the individual orders.   CBC AND DIFFERENTIAL    Narrative:     The following orders were created for panel order CBC & Differential.  Procedure                               Abnormality         Status                     ---------                               -----------         ------                     CBC Auto Differential[[id number]]        Normal              Final result                 Please  view results for these tests on the individual orders.   GREEN TOP   LAVENDER TOP   GOLD TOP - SST   LIGHT BLUE TOP       No orders to display                                         MDM    Final diagnoses:   Hyperglycemia       ED Disposition  ED Disposition     ED Disposition Condition Comment    Discharge Stable           Provider, No Known  [place] [id number]    In 3 days           Medication List      No changes were made to your prescriptions during this visit.          Antonio Ye MD  01/21/22 1432       Antonio Ye MD  01/21/22 1432

## 2022-01-21 NOTE — ED NOTES
Patient presents to ED with c/o of a headache, elevated blood pressure and nausea. The patient stated that his headache is 9/10 on VAS. Patient denies Covid exposure.      Ksenia Gonzalez RN  01/21/22 1049

## 2022-01-29 LAB
QT INTERVAL: 378 MS
QTC INTERVAL: 464 MS

## 2022-02-09 ENCOUNTER — OFFICE VISIT (OUTPATIENT)
Dept: FAMILY MEDICINE CLINIC | Facility: CLINIC | Age: 27
End: 2022-02-09

## 2022-02-09 VITALS
WEIGHT: 315 LBS | HEART RATE: 103 BPM | DIASTOLIC BLOOD PRESSURE: 90 MMHG | SYSTOLIC BLOOD PRESSURE: 140 MMHG | HEIGHT: 67 IN | BODY MASS INDEX: 49.44 KG/M2 | OXYGEN SATURATION: 98 %

## 2022-02-09 DIAGNOSIS — Z86.73 HISTORY OF CEREBROVASCULAR ACCIDENT (CVA) DUE TO ISCHEMIA: ICD-10-CM

## 2022-02-09 DIAGNOSIS — K21.9 GASTROESOPHAGEAL REFLUX DISEASE, UNSPECIFIED WHETHER ESOPHAGITIS PRESENT: ICD-10-CM

## 2022-02-09 DIAGNOSIS — Z76.0 ENCOUNTER FOR MEDICATION REFILL: ICD-10-CM

## 2022-02-09 DIAGNOSIS — E11.65 TYPE 2 DIABETES MELLITUS WITH HYPERGLYCEMIA, WITHOUT LONG-TERM CURRENT USE OF INSULIN: ICD-10-CM

## 2022-02-09 DIAGNOSIS — Z13.220 SCREENING FOR HYPERCHOLESTEROLEMIA: ICD-10-CM

## 2022-02-09 DIAGNOSIS — B37.49 YEAST DERMATITIS OF PENIS: Primary | ICD-10-CM

## 2022-02-09 DIAGNOSIS — M17.0 OSTEOARTHRITIS OF BOTH KNEES, UNSPECIFIED OSTEOARTHRITIS TYPE: ICD-10-CM

## 2022-02-09 DIAGNOSIS — J45.20 MILD INTERMITTENT ASTHMA, UNSPECIFIED WHETHER COMPLICATED: ICD-10-CM

## 2022-02-09 DIAGNOSIS — I10 HYPERTENSION, UNSPECIFIED TYPE: ICD-10-CM

## 2022-02-09 PROCEDURE — 99213 OFFICE O/P EST LOW 20 MIN: CPT | Performed by: STUDENT IN AN ORGANIZED HEALTH CARE EDUCATION/TRAINING PROGRAM

## 2022-02-09 RX ORDER — NYSTATIN 100000 [USP'U]/G
POWDER TOPICAL 2 TIMES DAILY
Qty: 60 G | Refills: 1 | Status: ON HOLD | OUTPATIENT
Start: 2022-02-09 | End: 2022-03-07

## 2022-02-09 RX ORDER — METFORMIN HYDROCHLORIDE 500 MG/1
1000 TABLET, EXTENDED RELEASE ORAL
Qty: 120 TABLET | Refills: 3 | Status: SHIPPED | OUTPATIENT
Start: 2022-02-09 | End: 2022-06-09

## 2022-02-09 RX ORDER — FAMOTIDINE 20 MG/1
20 TABLET, FILM COATED ORAL DAILY
Qty: 30 TABLET | Refills: 3 | Status: SHIPPED | OUTPATIENT
Start: 2022-02-09 | End: 2022-06-09

## 2022-02-09 RX ORDER — ATORVASTATIN CALCIUM 40 MG/1
40 TABLET, FILM COATED ORAL DAILY
Qty: 30 TABLET | Refills: 3 | Status: SHIPPED | OUTPATIENT
Start: 2022-02-09 | End: 2022-03-09 | Stop reason: HOSPADM

## 2022-02-09 RX ORDER — ASPIRIN 325 MG
325 TABLET, DELAYED RELEASE (ENTERIC COATED) ORAL DAILY
Qty: 30 TABLET | Refills: 3 | Status: SHIPPED | OUTPATIENT
Start: 2022-02-09 | End: 2022-06-09

## 2022-02-09 RX ORDER — FLUCONAZOLE 150 MG/1
150 TABLET ORAL
Qty: 3 TABLET | Refills: 0 | Status: SHIPPED | OUTPATIENT
Start: 2022-02-09 | End: 2022-02-14

## 2022-02-09 RX ORDER — LOSARTAN POTASSIUM 50 MG/1
50 TABLET ORAL
Qty: 30 TABLET | Refills: 3 | Status: SHIPPED | OUTPATIENT
Start: 2022-02-09 | End: 2022-08-15

## 2022-02-09 RX ORDER — ALBUTEROL SULFATE 90 UG/1
2 AEROSOL, METERED RESPIRATORY (INHALATION) EVERY 4 HOURS PRN
Qty: 18 G | Refills: 3 | Status: SHIPPED | OUTPATIENT
Start: 2022-02-09 | End: 2022-08-02 | Stop reason: SDUPTHER

## 2022-02-09 RX ORDER — MONTELUKAST SODIUM 10 MG/1
10 TABLET ORAL NIGHTLY
Qty: 30 TABLET | Refills: 3 | Status: SHIPPED | OUTPATIENT
Start: 2022-02-09 | End: 2023-01-09

## 2022-02-09 NOTE — PATIENT INSTRUCTIONS
Start metformin 1000 mg twice a day. You will restart your BP and stomach meds as soon as you pick them up.    Start taking full dose aspirin and lipitor. If aspirin is not covered, please pick it up over the counter.     Get a thumb splint from the drug store or [employer] and wear that as often as you can.     You can take over the counter ibuprofen (800 mg every 6 hours) or aleve 220 mg every 12 hours, for up to 14 days. Take one or the other, not both. Use heating pads or cold packs on your thumb as helpful.    You will need to return in 2 weeks for a finger stick and blood pressure check.     If you are unable to get any of your medications or glucometer today at the pharmacy, please call clinic right away at [phone].     Have your labs drawn in May.     If you do not hear from orthopedic surgery by next week, call them or us.

## 2022-02-09 NOTE — PROGRESS NOTES
Family Medicine Residency  Consuelo Mcneal MD    Subjective:     Venkatesh Parra is a 26 y.o. male who presents for ED follow up.    Patient was seen in the ED and his labs showed significant hyperglycemia with an elevated HbA1c of 12.7. He has not had his medications for several months because he hasn't followed up in clinic.    He has had some GERD symptoms lately.  He had excellent relief with Pepcid previously.    Has a yeast rash in groin which is going up to his left lateral abdomen. Has increased since his blood glucose has been out of control. He describes it as smelling like unbaked bread and has a white cheesy drainage.     His left thumb was dislocated a few days ago and is causing him some pain. He reduced his thumb himself and has full ROM and strength in all directions at this time.     Would like to have gel placed back in his knees. He had gel treatments in [id number].    The following portions of the patient's history were reviewed and updated as appropriate: allergies, current medications, past family history, past medical history, past social history, past surgical history and problem list.    Past Medical Hx:  Past Medical History:   Diagnosis Date   • Asthma    • Carpal tunnel syndrome    • Depression    • Diabetes mellitus (HCC)    • Hypertension    • Morbid obesity (HCC)    • Osteoarthritis    • PTSD (post-traumatic stress disorder)        Past Surgical Hx:  Past Surgical History:   Procedure Laterality Date   • CHOLECYSTECTOMY         Current Meds:    Current Outpatient Medications:   •  albuterol sulfate HFA (Ventolin HFA) 108 (90 Base) MCG/ACT inhaler, Inhale 2 puffs Every 4 (Four) Hours As Needed for Wheezing., Disp: 18 g, Rfl: 3  •  aspirin  MG tablet, Take 1 tablet by mouth Daily for 120 days., Disp: 30 tablet, Rfl: 3  •  atorvastatin (Lipitor) 40 MG tablet, Take 1 tablet by mouth Daily for 120 days., Disp: 30 tablet, Rfl: 3  •  Blood Glucose Monitoring Suppl (D-Care Glucometer)  w/Device kit, 1 kit 3 (Three) Times a Day Before Meals., Disp: 1 kit, Rfl: 0  •  famotidine (PEPCID) 20 MG tablet, Take 1 tablet by mouth Daily for 120 days., Disp: 30 tablet, Rfl: 3  •  fluconazole (Diflucan) 150 MG tablet, Take 1 tablet by mouth Every Other Day for 3 doses., Disp: 3 tablet, Rfl: 0  •  losartan (COZAAR) 50 MG tablet, Take 1 tablet by mouth Daily., Disp: 30 tablet, Rfl: 3  •  metFORMIN ER (GLUCOPHAGE-XR) 500 MG 24 hr tablet, Take 2 tablets by mouth 2 (Two) Times a Day Before Meals for 120 days., Disp: 120 tablet, Rfl: 3  •  montelukast (SINGULAIR) 10 MG tablet, Take 1 tablet by mouth Every Night for 30 days., Disp: 30 tablet, Rfl: 3  •  nystatin (MYCOSTATIN) 712647 UNIT/GM powder, Apply  topically to the appropriate area as directed 2 (Two) Times a Day., Disp: 60 g, Rfl: 1    Allergies:  Allergies   Allergen Reactions   • Lorcet [Hydrocodone-Acetaminophen] Mental Status Change   • Amoxicillin Rash   • Penicillins Rash     redness       Family Hx:  Family History   Problem Relation Age of Onset   • Hypertension Father         Social History:  Social History     Socioeconomic History   • Marital status: Single   Tobacco Use   • Smoking status: Former Smoker     Packs/day: 1.00     Types: Cigarettes   • Smokeless tobacco: Current User     Types: Chew   Substance and Sexual Activity   • Alcohol use: Not Currently   • Drug use: Never   • Sexual activity: Not Currently       Review of Systems  Review of Systems   Constitutional: Negative for activity change and appetite change.   HENT: Negative for congestion and ear pain.    Eyes: Negative for pain and discharge.   Respiratory: Negative for chest tightness and shortness of breath.    Cardiovascular: Negative for chest pain and palpitations.   Gastrointestinal: Negative for abdominal distention and abdominal pain.   Endocrine: Negative for cold intolerance and heat intolerance.   Genitourinary: Negative for difficulty urinating and dysuria.  "  Musculoskeletal: Positive for arthralgias. Negative for back pain.        Bilateral knee pain, left thumb pain   Skin: Positive for rash. Negative for color change.   Allergic/Immunologic: Negative for environmental allergies and food allergies.   Neurological: Negative for dizziness and headaches.   Hematological: Negative for adenopathy. Does not bruise/bleed easily.   Psychiatric/Behavioral: Negative for agitation and confusion.       Objective:     /90   Pulse 103   Ht 170.2 cm (67\")   Wt (!) 181 kg (399 lb)   SpO2 98%   BMI 62.49 kg/m²   Physical Exam  Vitals and nursing note reviewed.   Constitutional:       Appearance: He is well-developed. He is obese.   HENT:      Head: Normocephalic and atraumatic.      Mouth/Throat:      Mouth: Mucous membranes are moist.   Eyes:      Pupils: Pupils are equal, round, and reactive to light.   Neck:      Thyroid: No thyromegaly.      Trachea: No tracheal deviation.   Cardiovascular:      Rate and Rhythm: Normal rate.      Pulses:           Radial pulses are 2+ on the left side.        Dorsalis pedis pulses are 2+ on the right side and 2+ on the left side.      Heart sounds: Normal heart sounds, S1 normal and S2 normal.   Pulmonary:      Effort: Pulmonary effort is normal.      Breath sounds: Normal breath sounds.   Abdominal:      Palpations: Abdomen is soft.   Musculoskeletal:         General: Tenderness (to base of left thumb) present. Normal range of motion.      Cervical back: Neck supple.   Skin:     General: Skin is warm and dry.      Capillary Refill: Capillary refill takes 2 to 3 seconds.      Comments: Yeast dermatitis noted to left lateral abdomen, deferred groin exam.  Rash extends through waistline of pants   Neurological:      Mental Status: He is alert and oriented to person, place, and time.      GCS: GCS eye subscore is 4. GCS verbal subscore is 5. GCS motor subscore is 6.   Psychiatric:         Speech: Speech normal.         Behavior: Behavior " normal.         Thought Content: Thought content normal.          Assessment/Plan:     Diagnoses and all orders for this visit:    1. Yeast dermatitis of penis (Primary)  -     fluconazole (Diflucan) 150 MG tablet; Take 1 tablet by mouth Every Other Day for 3 doses.  Dispense: 3 tablet; Refill: 0  -     nystatin (MYCOSTATIN) 629860 UNIT/GM powder; Apply  topically to the appropriate area as directed 2 (Two) Times a Day.  Dispense: 60 g; Refill: 1    2. Type 2 diabetes mellitus with hyperglycemia, without long-term current use of insulin (HCC)  -     metFORMIN ER (GLUCOPHAGE-XR) 500 MG 24 hr tablet; Take 2 tablets by mouth 2 (Two) Times a Day Before Meals for 120 days.  Dispense: 120 tablet; Refill: 3  -     Blood Glucose Monitoring Suppl (D-Care Glucometer) w/Device kit; 1 kit 3 (Three) Times a Day Before Meals.  Dispense: 1 kit; Refill: 0  -     Hemoglobin A1c; Future    3. Encounter for medication refill  -     albuterol sulfate HFA (Ventolin HFA) 108 (90 Base) MCG/ACT inhaler; Inhale 2 puffs Every 4 (Four) Hours As Needed for Wheezing.  Dispense: 18 g; Refill: 3    4. Screening for hypercholesterolemia  -     Lipid panel; Future    5. Mild intermittent asthma, unspecified whether complicated  -     albuterol sulfate HFA (Ventolin HFA) 108 (90 Base) MCG/ACT inhaler; Inhale 2 puffs Every 4 (Four) Hours As Needed for Wheezing.  Dispense: 18 g; Refill: 3  -     montelukast (SINGULAIR) 10 MG tablet; Take 1 tablet by mouth Every Night for 30 days.  Dispense: 30 tablet; Refill: 3    6. Hypertension, unspecified type  -     losartan (COZAAR) 50 MG tablet; Take 1 tablet by mouth Daily.  Dispense: 30 tablet; Refill: 3    7. Gastroesophageal reflux disease, unspecified whether esophagitis present  -     famotidine (PEPCID) 20 MG tablet; Take 1 tablet by mouth Daily for 120 days.  Dispense: 30 tablet; Refill: 3    8. History of cerebrovascular accident (CVA) due to ischemia  -     atorvastatin (Lipitor) 40 MG tablet; Take 1  tablet by mouth Daily for 120 days.  Dispense: 30 tablet; Refill: 3  -     aspirin  MG tablet; Take 1 tablet by mouth Daily for 120 days.  Dispense: 30 tablet; Refill: 3    9. Osteoarthritis of both knees, unspecified osteoarthritis type  -     Ambulatory Referral to Orthopedic Surgery    Reordered previous medications: Aspirin, Lipitor, Metformin, albuterol, Singulair, losartan, Pepcid.  Patient given new glucometer.  Increase Metformin dose from previous to 1 g twice daily.  Patient does not want to start injectables and was well controlled with previous Metformin dose prior to his running out of medication.  Patient continued on aspirin and Lipitor for TIA/CVA from June 2021.  Patient instructed to take his medications as directed and follow-up in 2 weeks for blood pressure and blood sugar check.  Will repeat hemoglobin A1c in 3 months to evaluate improvement.  Work note provided for patient for today.  Will address care gaps at next visit.    · Rx changes: Increase Metformin to 1 g twice daily, increase Pepcid to 20 mg daily, decrease Lipitor to 40 mg daily reordered all previous medications  · Patient Education: See above  · Compliance at present is estimated to be good.   · Efforts to improve compliance (if necessary) will be directed at Medication administration and proper follow-ups.         Follow-up:     Return in about 2 weeks (around 2/23/2022) for Recheck BP and glucose.    Preventative:  Health Maintenance   Topic Date Due   • URINE MICROALBUMIN  Never done   • ANNUAL PHYSICAL  Never done   • COVID-19 Vaccine (1) Never done   • Pneumococcal Vaccine 0-64 (1 of 2 - PPSV23) Never done   • Hepatitis B (1 of 3 - Risk 3-dose series) Never done   • HEPATITIS C SCREENING  Never done   • DIABETIC FOOT EXAM  Never done   • DIABETIC EYE EXAM  Never done   • INFLUENZA VACCINE  08/01/2021   • HEMOGLOBIN A1C  07/21/2022   • TDAP/TD VACCINES (3 - Td or Tdap) 11/01/2026     Male Preventative: Exercises  regularly  Recommended: none  Vaccine Counseling: N/A    Weight  -Class: Obese Class III extreme obesity: > or equal to 40kg/m2  -Patient's Body mass index is 62.49 kg/m². indicating that he is morbidly obese (BMI > 40 or > 35 with obesity - related health condition).   Alcohol use:  reports previous alcohol use.  Nicotine status  reports that he has quit smoking. His smoking use included cigarettes. He smoked 1.00 pack per day. His smokeless tobacco use includes chew.    Goals    None         RISK SCORE: 5      This document has been electronically signed by Consuelo Mcneal MD on February 9, 2022 16:14 CST    Consuelo Mcneal MD PGY-3  Part of this note may be an electronic transcription/translation of spoken language to printed text using the Dragon Dictation System.

## 2022-02-15 NOTE — PROGRESS NOTES
I have seen the patient.  I have reviewed the notes, assessments, and/or procedures performed by Dr. Mcneal, I concur with her/his documentation and assessment and plan for Venkatesh Parra.       This document has been electronically signed by Rivas Ortiz MD on February 15, 2022 11:33 CST

## 2022-02-18 DIAGNOSIS — M25.561 PAIN IN BOTH KNEES, UNSPECIFIED CHRONICITY: Primary | ICD-10-CM

## 2022-02-18 DIAGNOSIS — M25.562 PAIN IN BOTH KNEES, UNSPECIFIED CHRONICITY: Primary | ICD-10-CM

## 2022-02-19 ENCOUNTER — HOSPITAL ENCOUNTER (EMERGENCY)
Facility: HOSPITAL | Age: 27
Discharge: HOME | End: 2022-02-19
Attending: FAMILY MEDICINE | Admitting: FAMILY MEDICINE

## 2022-02-19 VITALS
BODY MASS INDEX: 50.62 KG/M2 | HEIGHT: 66 IN | RESPIRATION RATE: 18 BRPM | TEMPERATURE: 98.4 F | HEART RATE: 98 BPM | OXYGEN SATURATION: 97 % | WEIGHT: 315 LBS | DIASTOLIC BLOOD PRESSURE: 84 MMHG | SYSTOLIC BLOOD PRESSURE: 155 MMHG

## 2022-02-19 DIAGNOSIS — L73.9 FOLLICULITIS: Primary | ICD-10-CM

## 2022-02-19 PROCEDURE — 99282 EMERGENCY DEPT VISIT SF MDM: CPT

## 2022-02-19 RX ORDER — SULFAMETHOXAZOLE AND TRIMETHOPRIM 800; 160 MG/1; MG/1
1 TABLET ORAL 2 TIMES DAILY
Qty: 20 TABLET | Refills: 0 | Status: SHIPPED | OUTPATIENT
Start: 2022-02-19 | End: 2022-03-09 | Stop reason: HOSPADM

## 2022-02-19 NOTE — ED NOTES
Pt discharged home with d'c instructions he verbalized understanding of discharge and ambulated out of ED in no distress.      Siria Roe, RN  02/19/22 1822

## 2022-02-19 NOTE — ED PROVIDER NOTES
"Subjective     History provided by:  Patient   used: No    Patient is a 26 years old with past medical history of hypertension, morbid obesity, diabetes who presented here today because of rash on the pelvic area for the past few days.  Denies any pruritus.  Denies any fever chills or sweating.    Review of Systems   All other systems reviewed and are negative.      Past Medical History:   Diagnosis Date   • Asthma    • Carpal tunnel syndrome    • Depression    • Diabetes mellitus (HCC)    • Hypertension    • Morbid obesity (HCC)    • Osteoarthritis    • PTSD (post-traumatic stress disorder)        Allergies   Allergen Reactions   • Lorcet [Hydrocodone-Acetaminophen] Mental Status Change   • Amoxicillin Rash   • Penicillins Rash     redness       Past Surgical History:   Procedure Laterality Date   • CHOLECYSTECTOMY         Family History   Problem Relation Age of Onset   • Hypertension Father        Social History     Socioeconomic History   • Marital status: Single   Tobacco Use   • Smoking status: Former Smoker     Packs/day: 1.00     Types: Cigarettes   • Smokeless tobacco: Current User     Types: Chew   Substance and Sexual Activity   • Alcohol use: Not Currently   • Drug use: Never   • Sexual activity: Not Currently       /86 (BP Location: Right arm, Patient Position: Sitting)   Pulse 101   Temp 98.4 °F (36.9 °C) (Infrared)   Resp 18   Ht 167.6 cm (66\")   Wt (!) 182 kg (401 lb)   SpO2 99%   BMI 64.72 kg/m²     Objective   Physical Exam  Constitutional:       Appearance: Normal appearance. He is obese.   HENT:      Head: Normocephalic and atraumatic.      Right Ear: Tympanic membrane, ear canal and external ear normal.      Left Ear: Tympanic membrane, ear canal and external ear normal.      Nose: Nose normal.   Eyes:      Extraocular Movements: Extraocular movements intact.      Conjunctiva/sclera: Conjunctivae normal.      Pupils: Pupils are equal, round, and reactive to " light.   Cardiovascular:      Rate and Rhythm: Normal rate and regular rhythm.      Pulses: Normal pulses.      Heart sounds: Normal heart sounds.   Pulmonary:      Effort: Pulmonary effort is normal.      Breath sounds: Normal breath sounds.   Abdominal:      General: Abdomen is flat. Bowel sounds are normal.      Palpations: Abdomen is soft.   Musculoskeletal:         General: Normal range of motion.      Cervical back: Normal range of motion and neck supple.   Skin:     General: Skin is warm.      Capillary Refill: Capillary refill takes less than 2 seconds.             Comments: Redness, boils   Neurological:      General: No focal deficit present.      Mental Status: He is alert and oriented to person, place, and time.         Procedures           ED Course                                                 MDM    Final diagnoses:   Folliculitis       ED Disposition  ED Disposition     ED Disposition Condition Comment    Discharge Stable           Man Arambula MD  [address]  [facility] [address] [phone]    In 3 days           Medication List      New Prescriptions    sulfamethoxazole-trimethoprim 800-160 MG per tablet  Commonly known as: BACTRIM DS,SEPTRA DS  Take 1 tablet by mouth 2 (Two) Times a Day.           Where to Get Your Medications      These medications were sent to [facility] - [place], [place] - [address] - [phone]  - [phone] FX  [address], [place] [address]    Phone: [phone]   · sulfamethoxazole-trimethoprim 800-160 MG per tablet          Antonio Ye MD  02/19/22 [id number]

## 2022-02-23 ENCOUNTER — OFFICE VISIT (OUTPATIENT)
Dept: FAMILY MEDICINE CLINIC | Facility: CLINIC | Age: 27
End: 2022-02-23

## 2022-02-23 ENCOUNTER — OFFICE VISIT (OUTPATIENT)
Dept: ORTHOPEDIC SURGERY | Facility: CLINIC | Age: 27
End: 2022-02-23

## 2022-02-23 VITALS
DIASTOLIC BLOOD PRESSURE: 88 MMHG | WEIGHT: 315 LBS | BODY MASS INDEX: 49.44 KG/M2 | HEART RATE: 103 BPM | TEMPERATURE: 96.8 F | HEIGHT: 67 IN | SYSTOLIC BLOOD PRESSURE: 120 MMHG | OXYGEN SATURATION: 98 %

## 2022-02-23 VITALS — BODY MASS INDEX: 50.62 KG/M2 | HEIGHT: 66 IN | WEIGHT: 315 LBS

## 2022-02-23 DIAGNOSIS — M25.562 PAIN IN BOTH KNEES, UNSPECIFIED CHRONICITY: Primary | ICD-10-CM

## 2022-02-23 DIAGNOSIS — E66.01 MORBID OBESITY WITH BMI OF 60.0-69.9, ADULT: ICD-10-CM

## 2022-02-23 DIAGNOSIS — L73.9 FOLLICULITIS: Primary | ICD-10-CM

## 2022-02-23 DIAGNOSIS — M25.561 PAIN IN BOTH KNEES, UNSPECIFIED CHRONICITY: Primary | ICD-10-CM

## 2022-02-23 DIAGNOSIS — E11.65 UNCONTROLLED TYPE 2 DIABETES MELLITUS WITH HYPERGLYCEMIA: ICD-10-CM

## 2022-02-23 PROCEDURE — 99213 OFFICE O/P EST LOW 20 MIN: CPT | Performed by: CHIROPRACTOR

## 2022-02-23 PROCEDURE — 99214 OFFICE O/P EST MOD 30 MIN: CPT | Performed by: NURSE PRACTITIONER

## 2022-02-23 NOTE — PROGRESS NOTES
Venkatesh Parra is a 26 y.o. male   Primary provider:  Man Arambula MD       Chief Complaint   Patient presents with   • Left Knee - Initial Evaluation   • Right Knee - Initial Evaluation       HISTORY OF PRESENT ILLNESS:    Patient is a 26-year-old male who presents with complaints of bilateral knee pain.  Patient reports he has had knee pain since at least age 16.  He reports an injury in 2011 where he fell through a roof, however he has had no recent injuries or traumas.  He reports pain is moderate and constant.  He reports pain is a grinding and aching pain.  He has occasional clicking.  Standing, sitting, walking aggravate pain.  He reports increased stiffness after prolonged sitting.  He has tried OTC medications and viscosupplementation.  He reports viscosupplementation helped him in the past.  He has no complaints of burning, tingling, numbness today.  His most recent hemoglobin A1c is 12.  He is currently on Metformin.  He reports on Metformin his blood sugar is still running around 330.    Knee Pain   There was no injury mechanism. The pain is present in the left knee and right knee. The quality of the pain is described as aching (grinding). The pain is moderate. The pain has been constant since onset. Associated symptoms comments: Clicking/popping/snapping. He reports no foreign bodies present. The symptoms are aggravated by movement and weight bearing. He has tried acetaminophen and NSAIDs for the symptoms.        CONCURRENT MEDICAL HISTORY:    Past Medical History:   Diagnosis Date   • Asthma    • Carpal tunnel syndrome    • Depression    • Diabetes mellitus (HCC)    • Hypertension    • Morbid obesity (HCC)    • Osteoarthritis    • PTSD (post-traumatic stress disorder)        Allergies   Allergen Reactions   • Lorcet [Hydrocodone-Acetaminophen] Mental Status Change   • Amoxicillin Rash   • Penicillins Rash     redness         Current Outpatient Medications:   •  albuterol sulfate HFA (Ventolin  "HFA) 108 (90 Base) MCG/ACT inhaler, Inhale 2 puffs Every 4 (Four) Hours As Needed for Wheezing., Disp: 18 g, Rfl: 3  •  aspirin  MG tablet, Take 1 tablet by mouth Daily for 120 days., Disp: 30 tablet, Rfl: 3  •  atorvastatin (Lipitor) 40 MG tablet, Take 1 tablet by mouth Daily for 120 days., Disp: 30 tablet, Rfl: 3  •  Blood Glucose Monitoring Suppl (D-Care Glucometer) w/Device kit, 1 kit 3 (Three) Times a Day Before Meals., Disp: 1 kit, Rfl: 0  •  famotidine (PEPCID) 20 MG tablet, Take 1 tablet by mouth Daily for 120 days., Disp: 30 tablet, Rfl: 3  •  losartan (COZAAR) 50 MG tablet, Take 1 tablet by mouth Daily., Disp: 30 tablet, Rfl: 3  •  metFORMIN ER (GLUCOPHAGE-XR) 500 MG 24 hr tablet, Take 2 tablets by mouth 2 (Two) Times a Day Before Meals for 120 days., Disp: 120 tablet, Rfl: 3  •  montelukast (SINGULAIR) 10 MG tablet, Take 1 tablet by mouth Every Night for 30 days., Disp: 30 tablet, Rfl: 3  •  nystatin (MYCOSTATIN) 988477 UNIT/GM powder, Apply  topically to the appropriate area as directed 2 (Two) Times a Day., Disp: 60 g, Rfl: 1  •  sulfamethoxazole-trimethoprim (BACTRIM DS,SEPTRA DS) 800-160 MG per tablet, Take 1 tablet by mouth 2 (Two) Times a Day., Disp: 20 tablet, Rfl: 0    Past Surgical History:   Procedure Laterality Date   • CHOLECYSTECTOMY         Family History   Problem Relation Age of Onset   • Hypertension Father        Social History     Socioeconomic History   • Marital status: Single   Tobacco Use   • Smoking status: Former Smoker     Packs/day: 1.00     Types: Cigarettes   • Smokeless tobacco: Current User     Types: Chew   Vaping Use   • Vaping Use: Never used   Substance and Sexual Activity   • Alcohol use: Not Currently   • Drug use: Never   • Sexual activity: Not Currently        Review of Systems   Musculoskeletal:        Bilateral knee pain   Psychiatric/Behavioral: Positive for sleep disturbance.       PHYSICAL EXAMINATION:       Ht 167.6 cm (66\")   Wt (!) 179 kg (395 lb)   " BMI 63.75 kg/m²     Physical Exam  Vitals and nursing note reviewed.   Constitutional:       General: He is not in acute distress.     Appearance: He is well-developed. He is not toxic-appearing.   HENT:      Head: Normocephalic.   Pulmonary:      Effort: Pulmonary effort is normal. No respiratory distress.   Skin:     General: Skin is warm and dry.   Neurological:      Mental Status: He is alert and oriented to person, place, and time.   Psychiatric:         Behavior: Behavior normal.         Thought Content: Thought content normal.         Judgment: Judgment normal.         GAIT:     []  Normal  []  Antalgic    Assistive device: [x]  None  []  Walker     []  Crutches  []  Cane     []  Wheelchair  []  Stretcher    Right Knee Exam     Tenderness   Right knee tenderness location: Diffuse.    Range of Motion   Extension: 0   Flexion: 120     Other   Erythema: absent    Comments:  Mild pain limitations with arc of motion  No evidence of infection      Left Knee Exam     Tenderness   Left knee tenderness location: Diffuse.    Range of Motion   Extension: -5   Flexion: 130     Other   Erythema: absent    Comments:  Mild pain and limitations with arc of motion.  No evidence of infection                        ASSESSMENT:    Diagnoses and all orders for this visit:    Pain in both knees, unspecified chronicity    Uncontrolled type 2 diabetes mellitus with hyperglycemia (HCC)  -     Ambulatory Referral to Endocrinology  -     Ambulatory Referral to Nutrition Services    BMI 60.0-69.9, adult (HCC)  -     Ambulatory Referral to Endocrinology  -     Ambulatory Referral to Nutrition Services          PLAN      X-rays reviewed, joint spaces generally well maintained.  Intra-articular steroid injections not recommended today due to hemoglobin A1c of 12.  Bill discussion held with patient that I do not feel Metformin is adequate to control his type 2 diabetes.  Strongly recommend patient be seen by endocrinology.  Patient states  recently has had some blood sugars as high as 560.  Explained to patient his body cannot maintain with blood sugars at this level.  Loss of eyesight, other organ failure explained.  Strongly recommend weight loss to decrease pain in bilateral knees but also to help with diabetes.  Patient is very open today to dietitian referral, dietitian referral made.  Patient has had benefit from viscosupplementation in the past, viscosupplementation ordered.    EMR Dragon/Transciption Disclaimer: Some of this note may be an electronic transcription/translation of spoken language to printed text.  The electronic translation of spoken language may permit erroneous, or at times, nonsensical words or phrases to be inadvertently transcribed. Although I have reviewed the note for such errors, some may still exist.       Time spent of a minimum of 30 minutes including the face to face evaluation, reviewing of medical history and prior medial records, reviewing of diagnostic studies, ordering additional tests, documentation, patient education and coordination of care.       Return for Visco.        This document has been electronically signed by WILFREDO Faria on February 23, 2022 15:25 CST

## 2022-02-24 NOTE — PROGRESS NOTES
Family Medicine Residency  Man Arambula MD    Subjective:     Venkatesh Parra is a 26 y.o. male who presents for folliculitis and morbid obesity.  He was recently on 2/19 in the local ED where he was diagnosed with folliculitis.  Recommended treatment with Bactrim for 10 days.  He comes in today for follow-up evaluation.  He is also being seen for morbid obesity.  His current BMI is over 61%.  He does endorse that at his highest weight he was a little over 450 pounds.  He currently weighs 394 pounds.    The following portions of the patient's history were reviewed and updated as appropriate: allergies, current medications, past family history, past medical history, past social history, past surgical history and problem list.    Past Medical Hx:  Past Medical History:   Diagnosis Date   • Asthma    • Carpal tunnel syndrome    • Depression    • Diabetes mellitus (HCC)    • Hypertension    • Morbid obesity (HCC)    • Osteoarthritis    • PTSD (post-traumatic stress disorder)        Past Surgical Hx:  Past Surgical History:   Procedure Laterality Date   • CHOLECYSTECTOMY         Current Meds:    Current Outpatient Medications:   •  albuterol sulfate HFA (Ventolin HFA) 108 (90 Base) MCG/ACT inhaler, Inhale 2 puffs Every 4 (Four) Hours As Needed for Wheezing., Disp: 18 g, Rfl: 3  •  aspirin  MG tablet, Take 1 tablet by mouth Daily for 120 days., Disp: 30 tablet, Rfl: 3  •  atorvastatin (Lipitor) 40 MG tablet, Take 1 tablet by mouth Daily for 120 days., Disp: 30 tablet, Rfl: 3  •  Blood Glucose Monitoring Suppl (D-Care Glucometer) w/Device kit, 1 kit 3 (Three) Times a Day Before Meals., Disp: 1 kit, Rfl: 0  •  famotidine (PEPCID) 20 MG tablet, Take 1 tablet by mouth Daily for 120 days., Disp: 30 tablet, Rfl: 3  •  losartan (COZAAR) 50 MG tablet, Take 1 tablet by mouth Daily., Disp: 30 tablet, Rfl: 3  •  metFORMIN ER (GLUCOPHAGE-XR) 500 MG 24 hr tablet, Take 2 tablets by mouth 2 (Two) Times a Day Before Meals for  "120 days., Disp: 120 tablet, Rfl: 3  •  montelukast (SINGULAIR) 10 MG tablet, Take 1 tablet by mouth Every Night for 30 days., Disp: 30 tablet, Rfl: 3  •  nystatin (MYCOSTATIN) 279637 UNIT/GM powder, Apply  topically to the appropriate area as directed 2 (Two) Times a Day., Disp: 60 g, Rfl: 1  •  sulfamethoxazole-trimethoprim (BACTRIM DS,SEPTRA DS) 800-160 MG per tablet, Take 1 tablet by mouth 2 (Two) Times a Day., Disp: 20 tablet, Rfl: 0    Allergies:  Allergies   Allergen Reactions   • Lorcet [Hydrocodone-Acetaminophen] Mental Status Change   • Amoxicillin Rash   • Penicillins Rash     redness       Family Hx:  Family History   Problem Relation Age of Onset   • Hypertension Father         Social History:  Social History     Socioeconomic History   • Marital status: Single   Tobacco Use   • Smoking status: Former Smoker     Packs/day: 1.00     Types: Cigarettes   • Smokeless tobacco: Current User     Types: Chew   Vaping Use   • Vaping Use: Never used   Substance and Sexual Activity   • Alcohol use: Not Currently   • Drug use: Never   • Sexual activity: Not Currently       Review of Systems  Review of Systems   Constitutional: Negative for fatigue and unexpected weight change.   HENT: Negative for congestion and trouble swallowing.    Eyes:        Wears corrective lenses.   Respiratory: Negative for chest tightness and shortness of breath.    Cardiovascular: Negative for chest pain and palpitations.   Gastrointestinal: Negative for abdominal pain and blood in stool.   Genitourinary: Negative for dysuria and hematuria.   Musculoskeletal: Positive for gait problem. Negative for back pain.        Gait problem likely secondary to size.   Skin: Positive for rash.        In the lower pelvic/groin area   Neurological: Negative for seizures and headaches.       Objective:     /88   Pulse 103   Temp 96.8 °F (36 °C)   Ht 170.2 cm (67\")   Wt (!) 179 kg (394 lb 12.8 oz)   SpO2 98%   BMI 61.83 kg/m²   Physical " Exam  Constitutional:       General: He is not in acute distress.     Appearance: He is obese. He is not toxic-appearing.   HENT:      Nose: No congestion.      Mouth/Throat:      Pharynx: No oropharyngeal exudate or posterior oropharyngeal erythema.   Eyes:      General: No scleral icterus.  Cardiovascular:      Rate and Rhythm: Regular rhythm. Tachycardia present.      Pulses: Normal pulses.      Heart sounds: No murmur heard.       Comments: Heart rate 103.  Pulmonary:      Effort: No respiratory distress.      Breath sounds: No wheezing.   Abdominal:      General: There is distension.      Tenderness: There is no abdominal tenderness.      Comments: Bowel sounds difficult to appreciate due to habitus.   Musculoskeletal:      Right lower leg: No edema.      Left lower leg: No edema.   Skin:     Findings: Rash present.      Comments: Multiple 1 cm in diameter healing follicular lesions in lower pelvic/groin area.  No current discharge from lesions.  They appear to be healing.   Neurological:      General: No focal deficit present.   Psychiatric:         Mood and Affect: Mood normal.         Thought Content: Thought content normal.          Assessment/Plan:     Diagnoses and all orders for this visit:    1. Folliculitis (Primary)  -Redness and boils in the groin area that have been present for about 1 week.  -Seen in local ED on 2/19 diagnosed with folliculitis given 10-day course of Bactrim.  -Folliculitis seems to improved since starting the medication.  -Examination today does show evidence of healing follicular lesions.  No new boils are noted.  No drainage from current lesions.  -Recommended the patient continue his Bactrim until full 10-day course of been completed.  -If not significantly improved at that time he is to call for further evaluation.  -Patient's weight discussed as a possible contributing factor to the folliculitis given the large pannus.    2. Morbid obesity with BMI of 60.0-69.9, adult  ([facility])  -Patient's highest lifetime weight a little over 450 pounds.  -Current weight 394 pounds.  -Current BMI 61.83  -He understands that the obesity puts him at significant risk for worsening diabetes and hypertension.  -Endorses that he walks on a daily basis.  -Endorses that he has cut out sugary soft drinks altogether.  -Advised to continue dieting and exercising and to practice decreased portion control as well as decreased fried fatty foods.  -At some point in the past had referral to bariatric surgery in [place] but did not keep the appointment.  -Would like renewed referral for bariatric evaluation in [place].  -Is going to try to find the name of the surgeon that he previously was arranged to see.      · Rx changes: None  · Patient Education: Importance of continue the antibiotic until full course has been completed  · Compliance at present is estimated to be excellent.        Follow-up:     Return in about 4 weeks (around 3/23/2022) for Recheck.    Preventative:  Health Maintenance   Topic Date Due   • URINE MICROALBUMIN  Never done   • ANNUAL PHYSICAL  Never done   • COVID-19 Vaccine (1) Never done   • Pneumococcal Vaccine 0-64 (1 of 2 - PPSV23) Never done   • Hepatitis B (1 of 3 - Risk 3-dose series) Never done   • HEPATITIS C SCREENING  Never done   • DIABETIC FOOT EXAM  Never done   • DIABETIC EYE EXAM  Never done   • INFLUENZA VACCINE  08/01/2021   • HEMOGLOBIN A1C  07/21/2022   • TDAP/TD VACCINES (3 - Td or Tdap) 11/01/2026       Weight  -Class: Obese Class III extreme obesity: > or equal to 40kg/m2  -Patient's Body mass index is 61.83 kg/m². indicating that he is morbidly obese (BMI > 40 or > 35 with obesity - related health condition). Obesity-related health conditions include the following: hypertension and diabetes mellitus. Obesity is improving with lifestyle modifications. BMI is is above average; BMI management plan is completed. We discussed portion control, increasing exercise and  consulting a Bariatric surgeon..   eat more fruits and vegetables, decrease soda or juice intake, increase water intake, increase physical activity, reduce portion size and cut out extra servings    Alcohol use:  reports previous alcohol use.  Nicotine status  reports that he has quit smoking. His smoking use included cigarettes. He smoked 1.00 pack per day. His smokeless tobacco use includes chew.    Goals    None         RISK SCORE: 4        This document has been electronically signed by Man Arambula MD on February 24, 2022 14:21 CST

## 2022-02-28 NOTE — PROGRESS NOTES
I have seen the patient.  I have reviewed the notes, assessments, and/or procedures performed by Dr. Arambula, I concur with her/his documentation and assessment and plan for Venkatesh Parra.       This document has been electronically signed by Rivas Ortiz MD on February 28, 2022 16:52 CST

## 2022-03-07 ENCOUNTER — APPOINTMENT (OUTPATIENT)
Dept: GENERAL RADIOLOGY | Facility: HOSPITAL | Age: 27
End: 2022-03-07

## 2022-03-07 ENCOUNTER — HOSPITAL ENCOUNTER (OUTPATIENT)
Facility: HOSPITAL | Age: 27
Setting detail: OBSERVATION
Discharge: HOME | End: 2022-03-09
Attending: STUDENT IN AN ORGANIZED HEALTH CARE EDUCATION/TRAINING PROGRAM | Admitting: STUDENT IN AN ORGANIZED HEALTH CARE EDUCATION/TRAINING PROGRAM

## 2022-03-07 ENCOUNTER — APPOINTMENT (OUTPATIENT)
Dept: CT IMAGING | Facility: HOSPITAL | Age: 27
End: 2022-03-07

## 2022-03-07 DIAGNOSIS — R26.89 DECREASED FUNCTIONAL MOBILITY: ICD-10-CM

## 2022-03-07 DIAGNOSIS — Z78.9 IMPAIRED MOBILITY AND ADLS: ICD-10-CM

## 2022-03-07 DIAGNOSIS — R20.0 NUMBNESS: ICD-10-CM

## 2022-03-07 DIAGNOSIS — R29.90 STROKE-LIKE SYMPTOM: Primary | ICD-10-CM

## 2022-03-07 DIAGNOSIS — Z74.09 IMPAIRED MOBILITY AND ADLS: ICD-10-CM

## 2022-03-07 LAB
ABO GROUP BLD: NORMAL
ABO GROUP BLD: NORMAL
ALBUMIN SERPL-MCNC: 4.1 G/DL (ref 3.5–5.2)
ALBUMIN/GLOB SERPL: 1.2 G/DL
ALP SERPL-CCNC: 97 U/L (ref 39–117)
ALT SERPL W P-5'-P-CCNC: 37 U/L (ref 1–41)
ANION GAP SERPL CALCULATED.3IONS-SCNC: 14 MMOL/L (ref 5–15)
APTT PPP: 25.7 SECONDS (ref 20–40.3)
AST SERPL-CCNC: 14 U/L (ref 1–40)
BASOPHILS # BLD AUTO: 0.06 10*3/MM3 (ref 0–0.2)
BASOPHILS NFR BLD AUTO: 0.6 % (ref 0–1.5)
BILIRUB SERPL-MCNC: 0.6 MG/DL (ref 0–1.2)
BLD GP AB SCN SERPL QL: NEGATIVE
BUN SERPL-MCNC: 15 MG/DL (ref 6–20)
BUN/CREAT SERPL: 20.8 (ref 7–25)
CALCIUM SPEC-SCNC: 9.3 MG/DL (ref 8.6–10.5)
CHLORIDE SERPL-SCNC: 97 MMOL/L (ref 98–107)
CO2 SERPL-SCNC: 22 MMOL/L (ref 22–29)
CREAT SERPL-MCNC: 0.72 MG/DL (ref 0.76–1.27)
DEPRECATED RDW RBC AUTO: 41.4 FL (ref 37–54)
EGFRCR SERPLBLD CKD-EPI 2021: 128.4 ML/MIN/1.73
EOSINOPHIL # BLD AUTO: 0.38 10*3/MM3 (ref 0–0.4)
EOSINOPHIL NFR BLD AUTO: 3.5 % (ref 0.3–6.2)
ERYTHROCYTE [DISTWIDTH] IN BLOOD BY AUTOMATED COUNT: 14.2 % (ref 12.3–15.4)
FLUAV SUBTYP SPEC NAA+PROBE: NOT DETECTED
FLUBV RNA ISLT QL NAA+PROBE: NOT DETECTED
GLOBULIN UR ELPH-MCNC: 3.5 GM/DL
GLUCOSE BLDC GLUCOMTR-MCNC: 346 MG/DL (ref 70–130)
GLUCOSE SERPL-MCNC: 346 MG/DL (ref 65–99)
HCT VFR BLD AUTO: 47 % (ref 37.5–51)
HGB BLD-MCNC: 15.8 G/DL (ref 13–17.7)
HOLD SPECIMEN: NORMAL
IMM GRANULOCYTES # BLD AUTO: 0.06 10*3/MM3 (ref 0–0.05)
IMM GRANULOCYTES NFR BLD AUTO: 0.6 % (ref 0–0.5)
INR PPP: 0.97 (ref 0.8–1.2)
LYMPHOCYTES # BLD AUTO: 3.22 10*3/MM3 (ref 0.7–3.1)
LYMPHOCYTES NFR BLD AUTO: 29.7 % (ref 19.6–45.3)
Lab: NORMAL
MCH RBC QN AUTO: 27.7 PG (ref 26.6–33)
MCHC RBC AUTO-ENTMCNC: 33.6 G/DL (ref 31.5–35.7)
MCV RBC AUTO: 82.3 FL (ref 79–97)
MONOCYTES # BLD AUTO: 0.58 10*3/MM3 (ref 0.1–0.9)
MONOCYTES NFR BLD AUTO: 5.4 % (ref 5–12)
NEUTROPHILS NFR BLD AUTO: 6.54 10*3/MM3 (ref 1.7–7)
NEUTROPHILS NFR BLD AUTO: 60.2 % (ref 42.7–76)
NRBC BLD AUTO-RTO: 0 /100 WBC (ref 0–0.2)
PLATELET # BLD AUTO: 328 10*3/MM3 (ref 140–450)
PMV BLD AUTO: 10.4 FL (ref 6–12)
POTASSIUM SERPL-SCNC: 4.1 MMOL/L (ref 3.5–5.2)
PROT SERPL-MCNC: 7.6 G/DL (ref 6–8.5)
PROTHROMBIN TIME: 12.8 SECONDS (ref 11.1–15.3)
RBC # BLD AUTO: 5.71 10*6/MM3 (ref 4.14–5.8)
RH BLD: NEGATIVE
RH BLD: NEGATIVE
SARS-COV-2 RNA PNL SPEC NAA+PROBE: NOT DETECTED
SODIUM SERPL-SCNC: 133 MMOL/L (ref 136–145)
T&S EXPIRATION DATE: NORMAL
TROPONIN T SERPL-MCNC: <0.01 NG/ML (ref 0–0.03)
WBC NRBC COR # BLD: 10.84 10*3/MM3 (ref 3.4–10.8)
WHOLE BLOOD HOLD SPECIMEN: NORMAL
WHOLE BLOOD HOLD SPECIMEN: NORMAL

## 2022-03-07 PROCEDURE — G0378 HOSPITAL OBSERVATION PER HR: HCPCS

## 2022-03-07 PROCEDURE — 93010 ELECTROCARDIOGRAM REPORT: CPT | Performed by: INTERNAL MEDICINE

## 2022-03-07 PROCEDURE — 84484 ASSAY OF TROPONIN QUANT: CPT | Performed by: STUDENT IN AN ORGANIZED HEALTH CARE EDUCATION/TRAINING PROGRAM

## 2022-03-07 PROCEDURE — 86901 BLOOD TYPING SEROLOGIC RH(D): CPT | Performed by: STUDENT IN AN ORGANIZED HEALTH CARE EDUCATION/TRAINING PROGRAM

## 2022-03-07 PROCEDURE — 36415 COLL VENOUS BLD VENIPUNCTURE: CPT | Performed by: STUDENT IN AN ORGANIZED HEALTH CARE EDUCATION/TRAINING PROGRAM

## 2022-03-07 PROCEDURE — 82962 GLUCOSE BLOOD TEST: CPT

## 2022-03-07 PROCEDURE — 85610 PROTHROMBIN TIME: CPT | Performed by: STUDENT IN AN ORGANIZED HEALTH CARE EDUCATION/TRAINING PROGRAM

## 2022-03-07 PROCEDURE — 70450 CT HEAD/BRAIN W/O DYE: CPT

## 2022-03-07 PROCEDURE — 85730 THROMBOPLASTIN TIME PARTIAL: CPT | Performed by: STUDENT IN AN ORGANIZED HEALTH CARE EDUCATION/TRAINING PROGRAM

## 2022-03-07 PROCEDURE — 70498 CT ANGIOGRAPHY NECK: CPT

## 2022-03-07 PROCEDURE — 93005 ELECTROCARDIOGRAM TRACING: CPT | Performed by: STUDENT IN AN ORGANIZED HEALTH CARE EDUCATION/TRAINING PROGRAM

## 2022-03-07 PROCEDURE — 99214 OFFICE O/P EST MOD 30 MIN: CPT | Performed by: NURSE PRACTITIONER

## 2022-03-07 PROCEDURE — 86900 BLOOD TYPING SEROLOGIC ABO: CPT | Performed by: STUDENT IN AN ORGANIZED HEALTH CARE EDUCATION/TRAINING PROGRAM

## 2022-03-07 PROCEDURE — 85025 COMPLETE CBC W/AUTO DIFF WBC: CPT | Performed by: STUDENT IN AN ORGANIZED HEALTH CARE EDUCATION/TRAINING PROGRAM

## 2022-03-07 PROCEDURE — 86900 BLOOD TYPING SEROLOGIC ABO: CPT

## 2022-03-07 PROCEDURE — 71045 X-RAY EXAM CHEST 1 VIEW: CPT

## 2022-03-07 PROCEDURE — 86850 RBC ANTIBODY SCREEN: CPT | Performed by: STUDENT IN AN ORGANIZED HEALTH CARE EDUCATION/TRAINING PROGRAM

## 2022-03-07 PROCEDURE — C9803 HOPD COVID-19 SPEC COLLECT: HCPCS

## 2022-03-07 PROCEDURE — 86901 BLOOD TYPING SEROLOGIC RH(D): CPT

## 2022-03-07 PROCEDURE — 80053 COMPREHEN METABOLIC PANEL: CPT | Performed by: STUDENT IN AN ORGANIZED HEALTH CARE EDUCATION/TRAINING PROGRAM

## 2022-03-07 PROCEDURE — 99285 EMERGENCY DEPT VISIT HI MDM: CPT

## 2022-03-07 PROCEDURE — 0042T HC CT CEREBRAL PERFUSION W/WO CONTRAST: CPT

## 2022-03-07 PROCEDURE — 70496 CT ANGIOGRAPHY HEAD: CPT

## 2022-03-07 PROCEDURE — 87636 SARSCOV2 & INF A&B AMP PRB: CPT | Performed by: STUDENT IN AN ORGANIZED HEALTH CARE EDUCATION/TRAINING PROGRAM

## 2022-03-07 PROCEDURE — 0 IOPAMIDOL PER 1 ML: Performed by: STUDENT IN AN ORGANIZED HEALTH CARE EDUCATION/TRAINING PROGRAM

## 2022-03-07 RX ORDER — NICOTINE POLACRILEX 4 MG
15 LOZENGE BUCCAL
Status: DISCONTINUED | OUTPATIENT
Start: 2022-03-07 | End: 2022-03-09 | Stop reason: HOSPADM

## 2022-03-07 RX ORDER — SODIUM CHLORIDE 0.9 % (FLUSH) 0.9 %
10 SYRINGE (ML) INJECTION AS NEEDED
Status: DISCONTINUED | OUTPATIENT
Start: 2022-03-07 | End: 2022-03-09 | Stop reason: HOSPADM

## 2022-03-07 RX ORDER — ONDANSETRON 4 MG/1
4 TABLET, FILM COATED ORAL EVERY 6 HOURS PRN
Status: DISCONTINUED | OUTPATIENT
Start: 2022-03-07 | End: 2022-03-09 | Stop reason: HOSPADM

## 2022-03-07 RX ORDER — ASPIRIN 325 MG
325 TABLET ORAL DAILY
Status: DISCONTINUED | OUTPATIENT
Start: 2022-03-07 | End: 2022-03-09 | Stop reason: HOSPADM

## 2022-03-07 RX ORDER — SODIUM CHLORIDE 9 MG/ML
100 INJECTION, SOLUTION INTRAVENOUS
Status: COMPLETED | OUTPATIENT
Start: 2022-03-07 | End: 2022-03-07

## 2022-03-07 RX ORDER — MONTELUKAST SODIUM 10 MG/1
10 TABLET ORAL NIGHTLY
Status: DISCONTINUED | OUTPATIENT
Start: 2022-03-08 | End: 2022-03-09 | Stop reason: HOSPADM

## 2022-03-07 RX ORDER — FAMOTIDINE 20 MG/1
20 TABLET, FILM COATED ORAL DAILY
Status: DISCONTINUED | OUTPATIENT
Start: 2022-03-08 | End: 2022-03-09 | Stop reason: HOSPADM

## 2022-03-07 RX ORDER — DEXTROSE MONOHYDRATE 25 G/50ML
25 INJECTION, SOLUTION INTRAVENOUS
Status: DISCONTINUED | OUTPATIENT
Start: 2022-03-07 | End: 2022-03-09 | Stop reason: HOSPADM

## 2022-03-07 RX ORDER — SODIUM CHLORIDE 0.9 % (FLUSH) 0.9 %
10 SYRINGE (ML) INJECTION EVERY 12 HOURS SCHEDULED
Status: DISCONTINUED | OUTPATIENT
Start: 2022-03-08 | End: 2022-03-09 | Stop reason: HOSPADM

## 2022-03-07 RX ORDER — ATORVASTATIN CALCIUM 40 MG/1
80 TABLET, FILM COATED ORAL NIGHTLY
Status: DISCONTINUED | OUTPATIENT
Start: 2022-03-07 | End: 2022-03-09 | Stop reason: HOSPADM

## 2022-03-07 RX ORDER — ASPIRIN 300 MG/1
300 SUPPOSITORY RECTAL DAILY
Status: DISCONTINUED | OUTPATIENT
Start: 2022-03-07 | End: 2022-03-09 | Stop reason: HOSPADM

## 2022-03-07 RX ORDER — ONDANSETRON 2 MG/ML
4 INJECTION INTRAMUSCULAR; INTRAVENOUS EVERY 6 HOURS PRN
Status: DISCONTINUED | OUTPATIENT
Start: 2022-03-07 | End: 2022-03-09 | Stop reason: HOSPADM

## 2022-03-07 RX ORDER — ACETAMINOPHEN 325 MG/1
650 TABLET ORAL EVERY 4 HOURS PRN
Status: DISCONTINUED | OUTPATIENT
Start: 2022-03-07 | End: 2022-03-09 | Stop reason: HOSPADM

## 2022-03-07 RX ADMIN — ASPIRIN 325 MG: 325 TABLET ORAL at 16:04

## 2022-03-07 RX ADMIN — IOPAMIDOL 90 ML: 755 INJECTION, SOLUTION INTRAVENOUS at 13:10

## 2022-03-07 RX ADMIN — SODIUM CHLORIDE 100 ML: 9 INJECTION, SOLUTION INTRAVENOUS at 13:17

## 2022-03-07 RX ADMIN — ATORVASTATIN CALCIUM 80 MG: 40 TABLET, FILM COATED ORAL at 20:43

## 2022-03-07 RX ADMIN — IOPAMIDOL 50 ML: 755 INJECTION, SOLUTION INTRAVENOUS at 13:16

## 2022-03-07 RX ADMIN — SODIUM CHLORIDE, POTASSIUM CHLORIDE, SODIUM LACTATE AND CALCIUM CHLORIDE 1000 ML: 600; 310; 30; 20 INJECTION, SOLUTION INTRAVENOUS at 13:55

## 2022-03-07 NOTE — ED PROVIDER NOTES
Subjective   26-year-old male with history of a stroke last year requiring TPA comes to the ER chief complaint of left sided numbness and blurry vision.  EMS reports that the patient's blood glucose was about 500 for them.  Patient reports that his symptoms from the prior stroke resolved.  He denies weakness. Last known normal was 1130.      History provided by:  Patient   used: No        Review of Systems   Constitutional: Negative for chills and fever.   HENT: Negative for congestion and rhinorrhea.    Eyes: Positive for visual disturbance.   Respiratory: Negative for cough and shortness of breath.    Cardiovascular: Negative for chest pain and palpitations.   Gastrointestinal: Negative for abdominal pain and nausea.   Genitourinary: Negative for dysuria and flank pain.   Skin: Negative for color change and rash.   Neurological: Positive for numbness. Negative for dizziness, seizures, facial asymmetry, speech difficulty, weakness, light-headedness and headaches.   Psychiatric/Behavioral: Negative for agitation. The patient is not nervous/anxious.        Past Medical History:   Diagnosis Date   • Asthma    • Carpal tunnel syndrome    • Depression    • Diabetes mellitus (HCC)    • Hypertension    • Morbid obesity (HCC)    • Osteoarthritis    • PTSD (post-traumatic stress disorder)        Allergies   Allergen Reactions   • Lorcet [Hydrocodone-Acetaminophen] Mental Status Change   • Amoxicillin Rash   • Penicillins Rash     redness       Past Surgical History:   Procedure Laterality Date   • CHOLECYSTECTOMY         Family History   Problem Relation Age of Onset   • Hypertension Father        Social History     Socioeconomic History   • Marital status: Single   Tobacco Use   • Smoking status: Former Smoker     Packs/day: 1.00     Types: Cigarettes   • Smokeless tobacco: Current User     Types: Chew   Vaping Use   • Vaping Use: Never used   Substance and Sexual Activity   • Alcohol use: Not Currently  "  • Drug use: Never   • Sexual activity: Not Currently           Objective    Vitals:    03/07/22 1246 03/07/22 1353   BP: (!) 149/104 131/83   BP Location: Right arm    Patient Position: Sitting    Pulse: 120 114   Resp: 20 20   Temp: 97.5 °F (36.4 °C)    TempSrc: Oral    SpO2: 97%    Weight: (!) 178 kg (392 lb 6.4 oz)    Height: 170.2 cm (67\")        Physical Exam  Vitals and nursing note reviewed.   Constitutional:       General: He is not in acute distress.     Appearance: He is well-developed. He is not diaphoretic.   HENT:      Head: Normocephalic.   Eyes:      Conjunctiva/sclera: Conjunctivae normal.   Pulmonary:      Effort: Pulmonary effort is normal. No accessory muscle usage or respiratory distress.   Skin:     General: Skin is dry.   Neurological:      Mental Status: He is alert and oriented to person, place, and time.      GCS: GCS eye subscore is 4. GCS verbal subscore is 5. GCS motor subscore is 6.      Cranial Nerves: No dysarthria or facial asymmetry.      Sensory: Sensory deficit present.      Motor: No weakness.      Coordination: Coordination is intact.   Psychiatric:         Behavior: Behavior normal.         Procedures           ED Course      Results for orders placed or performed during the hospital encounter of 03/07/22   Comprehensive Metabolic Panel    Specimen: Blood   Result Value Ref Range    Glucose 346 (H) 65 - 99 mg/dL    BUN 15 6 - 20 mg/dL    Creatinine 0.72 (L) 0.76 - 1.27 mg/dL    Sodium 133 (L) 136 - 145 mmol/L    Potassium 4.1 3.5 - 5.2 mmol/L    Chloride 97 (L) 98 - 107 mmol/L    CO2 22.0 22.0 - 29.0 mmol/L    Calcium 9.3 8.6 - 10.5 mg/dL    Total Protein 7.6 6.0 - 8.5 g/dL    Albumin 4.10 3.50 - 5.20 g/dL    ALT (SGPT) 37 1 - 41 U/L    AST (SGOT) 14 1 - 40 U/L    Alkaline Phosphatase 97 39 - 117 U/L    Total Bilirubin 0.6 0.0 - 1.2 mg/dL    Globulin 3.5 gm/dL    A/G Ratio 1.2 g/dL    BUN/Creatinine Ratio 20.8 7.0 - 25.0    Anion Gap 14.0 5.0 - 15.0 mmol/L    eGFR 128.4 >60.0 " mL/min/1.73   Protime-INR    Specimen: Blood   Result Value Ref Range    Protime 12.8 11.1 - 15.3 Seconds    INR 0.97 0.80 - 1.20   aPTT    Specimen: Blood   Result Value Ref Range    PTT 25.7 20.0 - 40.3 seconds   Troponin    Specimen: Blood   Result Value Ref Range    Troponin T <0.010 0.000 - 0.030 ng/mL   CBC Auto Differential    Specimen: Blood   Result Value Ref Range    WBC 10.84 (H) 3.40 - 10.80 10*3/mm3    RBC 5.71 4.14 - 5.80 10*6/mm3    Hemoglobin 15.8 13.0 - 17.7 g/dL    Hematocrit 47.0 37.5 - 51.0 %    MCV 82.3 79.0 - 97.0 fL    MCH 27.7 26.6 - 33.0 pg    MCHC 33.6 31.5 - 35.7 g/dL    RDW 14.2 12.3 - 15.4 %    RDW-SD 41.4 37.0 - 54.0 fl    MPV 10.4 6.0 - 12.0 fL    Platelets 328 140 - 450 10*3/mm3    Neutrophil % 60.2 42.7 - 76.0 %    Lymphocyte % 29.7 19.6 - 45.3 %    Monocyte % 5.4 5.0 - 12.0 %    Eosinophil % 3.5 0.3 - 6.2 %    Basophil % 0.6 0.0 - 1.5 %    Immature Grans % 0.6 (H) 0.0 - 0.5 %    Neutrophils, Absolute 6.54 1.70 - 7.00 10*3/mm3    Lymphocytes, Absolute 3.22 (H) 0.70 - 3.10 10*3/mm3    Monocytes, Absolute 0.58 0.10 - 0.90 10*3/mm3    Eosinophils, Absolute 0.38 0.00 - 0.40 10*3/mm3    Basophils, Absolute 0.06 0.00 - 0.20 10*3/mm3    Immature Grans, Absolute 0.06 (H) 0.00 - 0.05 10*3/mm3    nRBC 0.0 0.0 - 0.2 /100 WBC   PREVIOUS HISTORY    Specimen: Blood   Result Value Ref Range    Previous History No record on File    ABO RH Specimen Verification    Specimen: Blood   Result Value Ref Range    ABO Type A     RH type Negative      XR Chest 1 View   Final Result   No evidence of active disease.      Electronically signed by:  Jonnie Jordan MD  3/7/2022 1:57 PM CST   Workstation: [id number]      CT CEREBRAL PERFUSION WITH & WITHOUT CONTRAST   Final Result   Normal CT angiogram carotid arteries/neck. Normal CT   angiogram intracranial circulation.      Unremarkable CT perfusion study. No evidence of infarct.      Electronically signed by:  Jonnie Jordan MD  3/7/2022 2:41 PM CST   Workstation:  [id number]      CT Angiogram Neck   Final Result   Normal CT angiogram carotid arteries/neck. Normal CT   angiogram intracranial circulation.      Unremarkable CT perfusion study. No evidence of infarct.      Electronically signed by:  Jonnie Jordan MD  3/7/2022 2:41 PM CST   Workstation: [id number]      CT Angiogram Head w AI Analysis of LVO   Final Result   Normal CT angiogram carotid arteries/neck. Normal CT   angiogram intracranial circulation.      Unremarkable CT perfusion study. No evidence of infarct.      Electronically signed by:  Jonnie Jordan MD  3/7/2022 2:41 PM CST   Workstation: [id number]      CT Head Without Contrast Stroke Protocol   Final Result   Normal CT of the head.      Electronically signed by:  Parish Huynh MD  3/7/2022 1:22 PM CST   Workstation: [id number]                                                   MDM  Number of Diagnoses or Management Options  Numbness: new and requires workup  Stroke-like symptom: new and requires workup  Diagnosis management comments: Vital signs are stable, afebrile. Neuro consulted and the patient was evaluated. Neuro recommend against TPA. CT head negative for acute intracranial abnormalities. Neuro recommended inpatient admission for further work-up.      Final diagnoses:   Stroke-like symptom   Numbness       ED Disposition  ED Disposition     ED Disposition   Decision to Admit    Condition   --    Comment   Level of Care: Stepdown [25]   Diagnosis: Stroke-like symptom [[id number]]   Admitting Physician: ARSLAN POSADA [[id number]]   Attending Physician: ARSLAN POSADA [[id number]]               No follow-up provider specified.       Medication List      No changes were made to your prescriptions during this visit.          Arun Dye MD  03/07/22 5555

## 2022-03-07 NOTE — ED NOTES
This rn transported pt to er room at this time. Urinal provided to pt     Gloria Calixto RN  03/07/22 9240

## 2022-03-07 NOTE — ED NOTES
This rn and bozena guardado transported pt to ct. This rn to remain in ct with pt     Gloria Calixto RN  03/07/22 5151

## 2022-03-07 NOTE — H&P
[facility] Medicine Admission      Date of Admission: 3/7/2022      Primary Care Physician: Man Arambula MD      Chief Complaint: Numbness    HPI:  This is a 26 year old male with a history of TIA, HTN, DM, and morbid obesity who presents with left sided numbness of his face, arm, and leg which began at approximately 11:30 this morning. It has improved some but is still present.  No other known aggravating or alleviating factors. No other associated symptoms.     Concurrent Medical History:  has a past medical history of Asthma, Carpal tunnel syndrome, Depression, Diabetes mellitus (HCC), Hypertension, Morbid obesity (HCC), Osteoarthritis, and PTSD (post-traumatic stress disorder).    Past Surgical History:   Past Surgical History:   Procedure Laterality Date   • CHOLECYSTECTOMY         Family History:   Family History   Problem Relation Age of Onset   • Hypertension Father        Social History:   Social History     Socioeconomic History   • Marital status: Single   Tobacco Use   • Smoking status: Former Smoker     Packs/day: 1.00     Types: Cigarettes   • Smokeless tobacco: Current User     Types: Chew   Vaping Use   • Vaping Use: Never used   Substance and Sexual Activity   • Alcohol use: Not Currently   • Drug use: Never   • Sexual activity: Not Currently       Allergies:   Allergies   Allergen Reactions   • Lorcet [Hydrocodone-Acetaminophen] Mental Status Change   • Amoxicillin Rash   • Penicillins Rash     redness       Medications:   No current facility-administered medications on file prior to encounter.     Current Outpatient Medications on File Prior to Encounter   Medication Sig Dispense Refill   • albuterol sulfate HFA (Ventolin HFA) 108 (90 Base) MCG/ACT inhaler Inhale 2 puffs Every 4 (Four) Hours As Needed for Wheezing. 18 g 3   • aspirin  MG tablet Take 1 tablet by mouth Daily for 120 days. 30 tablet 3   • atorvastatin (Lipitor) 40 MG  tablet Take 1 tablet by mouth Daily for 120 days. 30 tablet 3   • Blood Glucose Monitoring Suppl (D-Care Glucometer) w/Device kit 1 kit 3 (Three) Times a Day Before Meals. 1 kit 0   • famotidine (PEPCID) 20 MG tablet Take 1 tablet by mouth Daily for 120 days. 30 tablet 3   • losartan (COZAAR) 50 MG tablet Take 1 tablet by mouth Daily. 30 tablet 3   • metFORMIN ER (GLUCOPHAGE-XR) 500 MG 24 hr tablet Take 2 tablets by mouth 2 (Two) Times a Day Before Meals for 120 days. 120 tablet 3   • montelukast (SINGULAIR) 10 MG tablet Take 1 tablet by mouth Every Night for 30 days. 30 tablet 3   • nystatin (MYCOSTATIN) 886996 UNIT/GM powder Apply  topically to the appropriate area as directed 2 (Two) Times a Day. 60 g 1   • sulfamethoxazole-trimethoprim (BACTRIM DS,SEPTRA DS) 800-160 MG per tablet Take 1 tablet by mouth 2 (Two) Times a Day. 20 tablet 0         Review of Systems:  Review of Systems   Constitutional: Negative for appetite change, chills, fatigue and fever.   HENT: Negative for congestion.    Eyes: Negative for visual disturbance.   Respiratory: Negative for cough, chest tightness, shortness of breath and wheezing.    Cardiovascular: Negative for chest pain and palpitations.   Gastrointestinal: Negative for abdominal distention, abdominal pain, diarrhea, nausea and vomiting.   Genitourinary: Negative for difficulty urinating and dysuria.   Musculoskeletal: Negative for arthralgias, back pain, myalgias and neck pain.   Skin: Negative for rash and wound.   Neurological: Positive for numbness. Negative for syncope, facial asymmetry and weakness.   All other systems reviewed and are negative.     Otherwise complete ROS is negative except as mentioned above.    Physical Exam:   Temp:  [97.5 °F (36.4 °C)] 97.5 °F (36.4 °C)  Heart Rate:  [102-120] 102  Resp:  [20] 20  BP: (131-149)/() 131/83  Physical Exam  Vitals reviewed.   Constitutional:       General: He is not in acute distress.     Appearance: Normal  appearance. He is well-developed. He is not diaphoretic.   HENT:      Head: Normocephalic and atraumatic.   Eyes:      Conjunctiva/sclera: Conjunctivae normal.   Cardiovascular:      Rate and Rhythm: Normal rate and regular rhythm.      Heart sounds: No murmur heard.    No friction rub. No gallop.   Pulmonary:      Effort: Pulmonary effort is normal. No respiratory distress.      Breath sounds: Normal breath sounds. No wheezing or rales.   Chest:      Chest wall: No tenderness.   Abdominal:      General: Bowel sounds are normal. There is no distension.      Palpations: Abdomen is soft.      Tenderness: There is no abdominal tenderness.   Musculoskeletal:         General: No swelling or deformity. Normal range of motion.      Cervical back: Normal range of motion and neck supple.      Comments: 5/5 strength U/L ext bilat   Skin:     General: Skin is warm and dry.      Findings: No erythema.   Neurological:      General: No focal deficit present.      Mental Status: He is alert and oriented to person, place, and time.      Comments: CN I: deferred  CN II: Visual fields intact  CN III,IV,VI: extraocular movements intact  CN V: Masseter strength intact and mandibular numbness  CN VII: Smile and eyelid closure symmetrical  CN VIII: Hearing intact  CN IX and X: Voice and palate movement intact  CN XI: Shoulder shrug intact  CN XII: Tongue protrusion and movement intact             Results Reviewed:  I have personally reviewed current lab, radiology, and data and agree with results.  Lab Results (last 24 hours)     Procedure Component Value Units Date/Time    [place] Draw [[id number]] Collected: 03/07/22 1350    Specimen: Blood Updated: 03/07/22 1503    Narrative:      The following orders were created for panel order [place] Draw.  Procedure                               Abnormality         Status                     ---------                               -----------         ------                     [facility]  (Gel)[[id number]]                                  Final result               Lavender Top[[id number]]                                     Final result               Gold Top - SST[[id number]]                                   Final result               Light Blue Top[[id number]]                                   Final result                 Please view results for these tests on the individual orders.    Lavender Top [[id number]] Collected: 03/07/22 1350    Specimen: Blood Updated: 03/07/22 1503     Extra Tube hold for add-on     Comment: Auto resulted       Green Top (Gel) [[id number]] Collected: 03/07/22 1350    Specimen: Blood Updated: 03/07/22 1503     Extra Tube Hold for add-ons.     Comment: Auto resulted.       Gold Top - SST [[id number]] Collected: 03/07/22 1350    Specimen: Blood Updated: 03/07/22 1503     Extra Tube Hold for add-ons.     Comment: Auto resulted.       Light Blue Top [[id number]] Collected: 03/07/22 1350    Specimen: Blood Updated: 03/07/22 1503     Extra Tube hold for add-on     Comment: Auto resulted       Comprehensive Metabolic Panel [[id number]]  (Abnormal) Collected: 03/07/22 1350    Specimen: Blood Updated: 03/07/22 1422     Glucose 346 mg/dL      BUN 15 mg/dL      Creatinine 0.72 mg/dL      Sodium 133 mmol/L      Potassium 4.1 mmol/L      Comment: Slight hemolysis detected by analyzer. Results may be affected.        Chloride 97 mmol/L      CO2 22.0 mmol/L      Calcium 9.3 mg/dL      Total Protein 7.6 g/dL      Albumin 4.10 g/dL      ALT (SGPT) 37 U/L      AST (SGOT) 14 U/L      Alkaline Phosphatase 97 U/L      Total Bilirubin 0.6 mg/dL      Globulin 3.5 gm/dL      A/G Ratio 1.2 g/dL      BUN/Creatinine Ratio 20.8     Anion Gap 14.0 mmol/L      eGFR 128.4 mL/min/1.73      Comment: National Kidney Foundation and American Society of Nephrology (ASN) Task Force recommended calculation based on the Chronic Kidney Disease Epidemiology Collaboration (CKD-EPI) equation refit without adjustment for  race.       Narrative:      GFR Normal >60  Chronic Kidney Disease <60  Kidney Failure <15      Troponin [[id number]]  (Normal) Collected: 03/07/22 1350    Specimen: Blood Updated: 03/07/22 1422     Troponin T <0.010 ng/mL     Narrative:      Troponin T Reference Range:  <= 0.03 ng/mL-   Negative for AMI  >0.03 ng/mL-     Abnormal for myocardial necrosis.  Clinicians would have to utilize clinical acumen, EKG, Troponin and serial changes to determine if it is an Acute Myocardial Infarction or myocardial injury due to an underlying chronic condition.       Results may be falsely decreased if patient taking Biotin.      Protime-INR [[id number]]  (Normal) Collected: 03/07/22 1350    Specimen: Blood Updated: 03/07/22 1416     Protime 12.8 Seconds      INR 0.97    Narrative:      Therapeutic range for most indications is 2.0-3.0 INR,  or 2.5-3.5 for mechanical heart valves.    aPTT [[id number]]  (Normal) Collected: 03/07/22 1350    Specimen: Blood Updated: 03/07/22 1416     PTT 25.7 seconds     Narrative:      The recommended Heparin therapeutic range is 68-97 seconds.    CBC & Differential [[id number]]  (Abnormal) Collected: 03/07/22 1350    Specimen: Blood Updated: 03/07/22 1359    Narrative:      The following orders were created for panel order CBC & Differential.  Procedure                               Abnormality         Status                     ---------                               -----------         ------                     CBC Auto Differential[[id number]]        Abnormal            Final result                 Please view results for these tests on the individual orders.    CBC Auto Differential [[id number]]  (Abnormal) Collected: 03/07/22 1350    Specimen: Blood Updated: 03/07/22 1359     WBC 10.84 10*3/mm3      RBC 5.71 10*6/mm3      Hemoglobin 15.8 g/dL      Hematocrit 47.0 %      MCV 82.3 fL      MCH 27.7 pg      MCHC 33.6 g/dL      RDW 14.2 %      RDW-SD 41.4 fl      MPV 10.4 fL      Platelets 328  10*3/mm3      Neutrophil % 60.2 %      Lymphocyte % 29.7 %      Monocyte % 5.4 %      Eosinophil % 3.5 %      Basophil % 0.6 %      Immature Grans % 0.6 %      Neutrophils, Absolute 6.54 10*3/mm3      Lymphocytes, Absolute 3.22 10*3/mm3      Monocytes, Absolute 0.58 10*3/mm3      Eosinophils, Absolute 0.38 10*3/mm3      Basophils, Absolute 0.06 10*3/mm3      Immature Grans, Absolute 0.06 10*3/mm3      nRBC 0.0 /100 WBC     Extra Tubes [[id number]] Collected: 03/07/22 1357    Specimen: Blood, Venous Line Updated: 03/07/22 1357    Narrative:      The following orders were created for panel order Extra Tubes.  Procedure                               Abnormality         Status                     ---------                               -----------         ------                     [place] Top[[id number]]                                         In process                   Please view results for these tests on the individual orders.    Gray Top [[id number]] Collected: 03/07/22 1357    Specimen: Blood Updated: 03/07/22 1357        Imaging Results (Last 24 Hours)     Procedure Component Value Units Date/Time    CT CEREBRAL PERFUSION WITH & WITHOUT CONTRAST [[id number]] Collected: 03/07/22 1306     Updated: 03/07/22 1444    Narrative:      CT angiography of the neck , carotid arteries with contrast. CT  angiography of the intracranial circulation. CT cerebral  perfusion.    Clinical Indication: Acute stroke protocol   .    Comparison: CT brain without contrast March 7, 2022..    Technique: The study was acquired in a helical fashion with  multiplanar thin-section reconstructions following uneventful  intravenous administration of rapid bolus iodinated contrast  material. 90 cc Isovue-370. Additional three-dimensional images  were performed on the independent [employer] workstation by a  radiologist and were transferred to the PACS station for further  evaluation.  Measurement of carotid stenosis based on NASCET method  for  calculating the degree of stenosis.  The NASCET method calculates  the degree of stenosis with reference to the lumen of the carotid  artery distal to the stenosis.     This exam was performed according to our departmental  dose-optimization program, which includes automated exposure  control, adjustment of the mA and/or kV according to patient size  and/or use of iterative reconstruction technique.        Findings: The common carotid arteries appear symmetric and  normal. There is no evidence of significant soft or calcific  plaque at the carotid bifurcations. There is no luminal narrowing  of the distal common carotid or internal carotid arteries. Left  vertebral artery is dominant. Both vertebral arteries are patent.  There is no evidence of stenosis at the origins of the common  carotid and vertebral arteries.     Normal CT angiography of intracranial circulation. Normal right  and left middle cerebral arteries. Normal anterior cerebral  arteries and posterior cerebral arteries.    CT PERFUSION:    Rapid:  CBF less than 30%, 0 mL. T max greater than 6.0 seconds,  0 mL.  Mass volume is 0. Mismatch ratio none.      Impression:      Normal CT angiogram carotid arteries/neck. Normal CT  angiogram intracranial circulation.    Unremarkable CT perfusion study. No evidence of infarct.    Electronically signed by:  Jonnie Jordan MD  3/7/2022 2:41 PM CST  Workstation: [id number]    CT Angiogram Head w AI Analysis of LVO [[id number]] Collected: 03/07/22 1259     Updated: 03/07/22 1444    Narrative:      CT angiography of the neck , carotid arteries with contrast. CT  angiography of the intracranial circulation. CT cerebral  perfusion.    Clinical Indication: Acute stroke protocol   .    Comparison: CT brain without contrast March 7, 2022..    Technique: The study was acquired in a helical fashion with  multiplanar thin-section reconstructions following uneventful  intravenous administration of rapid bolus iodinated  contrast  material. 90 cc Isovue-370. Additional three-dimensional images  were performed on the independent [employer] workstation by a  radiologist and were transferred to the PACS station for further  evaluation.  Measurement of carotid stenosis based on NASCET method for  calculating the degree of stenosis.  The NASCET method calculates  the degree of stenosis with reference to the lumen of the carotid  artery distal to the stenosis.     This exam was performed according to our departmental  dose-optimization program, which includes automated exposure  control, adjustment of the mA and/or kV according to patient size  and/or use of iterative reconstruction technique.        Findings: The common carotid arteries appear symmetric and  normal. There is no evidence of significant soft or calcific  plaque at the carotid bifurcations. There is no luminal narrowing  of the distal common carotid or internal carotid arteries. Left  vertebral artery is dominant. Both vertebral arteries are patent.  There is no evidence of stenosis at the origins of the common  carotid and vertebral arteries.     Normal CT angiography of intracranial circulation. Normal right  and left middle cerebral arteries. Normal anterior cerebral  arteries and posterior cerebral arteries.    CT PERFUSION:    Rapid:  CBF less than 30%, 0 mL. T max greater than 6.0 seconds,  0 mL.  Mass volume is 0. Mismatch ratio none.      Impression:      Normal CT angiogram carotid arteries/neck. Normal CT  angiogram intracranial circulation.    Unremarkable CT perfusion study. No evidence of infarct.    Electronically signed by:  Jonnie Jordan MD  3/7/2022 2:41 PM CST  Workstation: [id number]    CT Angiogram Neck [[id number]] Collected: 03/07/22 1259     Updated: 03/07/22 144    Narrative:      CT angiography of the neck , carotid arteries with contrast. CT  angiography of the intracranial circulation. CT cerebral  perfusion.    Clinical Indication: Acute stroke protocol    .    Comparison: CT brain without contrast March 7, 2022..    Technique: The study was acquired in a helical fashion with  multiplanar thin-section reconstructions following uneventful  intravenous administration of rapid bolus iodinated contrast  material. 90 cc Isovue-370. Additional three-dimensional images  were performed on the independent [employer] workstation by a  radiologist and were transferred to the PACS station for further  evaluation.  Measurement of carotid stenosis based on NASCET method for  calculating the degree of stenosis.  The NASCET method calculates  the degree of stenosis with reference to the lumen of the carotid  artery distal to the stenosis.     This exam was performed according to our departmental  dose-optimization program, which includes automated exposure  control, adjustment of the mA and/or kV according to patient size  and/or use of iterative reconstruction technique.        Findings: The common carotid arteries appear symmetric and  normal. There is no evidence of significant soft or calcific  plaque at the carotid bifurcations. There is no luminal narrowing  of the distal common carotid or internal carotid arteries. Left  vertebral artery is dominant. Both vertebral arteries are patent.  There is no evidence of stenosis at the origins of the common  carotid and vertebral arteries.     Normal CT angiography of intracranial circulation. Normal right  and left middle cerebral arteries. Normal anterior cerebral  arteries and posterior cerebral arteries.    CT PERFUSION:    Rapid:  CBF less than 30%, 0 mL. T max greater than 6.0 seconds,  0 mL.  Mass volume is 0. Mismatch ratio none.      Impression:      Normal CT angiogram carotid arteries/neck. Normal CT  angiogram intracranial circulation.    Unremarkable CT perfusion study. No evidence of infarct.    Electronically signed by:  Jonnie Jordan MD  3/7/2022 2:41 PM CST  Workstation: [id number]    XR Chest 1 View [[id number]] Collected:  03/07/22 1357     Updated: 03/07/22 1424    Narrative:      Chest x-ray single view.         CLINICAL INDICATION: Shortness of breath. Acute stroke protocol.    COMPARISON: June 20, 2021.    FINDINGS: Cardiac silhouette is normal in size. Pulmonary  vascularity is unremarkable.     No focal infiltrate or consolidation.  No pleural effusion.  No  pneumothorax.      Impression:      No evidence of active disease.    Electronically signed by:  Jonnie Jordan MD  3/7/2022 1:57 PM CST  Workstation: [id number]    CT Head Without Contrast Stroke Protocol [[id number]] Collected: 03/07/22 1256     Updated: 03/07/22 1325    Narrative:        PROCEDURE: CT head without contrast    REASON FOR EXAM: Stroke, follow up    This exam was performed according to our departmental  dose-optimization program, which includes automated exposure  control, adjustment of the mA and/or kV according to patient size  and/or use of iterative reconstruction technique.    FINDINGS: Comparison study dated June 21, 2021. Axial computer  tomography sequential imaging of the head was performed from the  vertex to the base of the skull. .Sagittal and coronal  reformation was performed .    The skull vault is intact. Paranasal sinuses and bilateral  mastoid air cells are well aerated. Cerebral and cerebellar  parenchymal are normal. Ventricular system and subarachnoid  spaces are normal.      Impression:      Normal CT of the head.    Electronically signed by:  Parish Huynh MD  3/7/2022 1:22 PM CST  Workstation: [id number]            Assessment:      Stroke-like symptom    Hypertension    Morbid obesity (HCC)    Diabetes mellitus (HCC)            Plan:  1. Observation, telemetry  2. Aspirin  3. Statin  4. Permissive hypertension  5. Glucose control: SSI  6. Neurology consultation appreciated  7. Unable to perform MRI due to body size.  Repeat CT head in the AM.  8. Bedrest today, PT/OT in AM  9. VTE PPx: Lovenox  10. FULL CODE       I confirmed that the  patient's Advance Care Plan is present, code status is documented, or surrogate decision maker is listed in the patient's medical record.     I have utilized all available immediate resources to obtain, update, or review the patient's current medications.     I discussed the patient's findings and my recommendations with: patient    Jose J Means Edwar, APRN  03/07/22  15:27 CST

## 2022-03-07 NOTE — CONSULTS
Stroke Consult Note    Patient Name: Venkatesh Parra   MRN: [id number]  Age: 26 y.o.  Sex: male  : 1995    Primary Care Physician: Man Arambula MD  Referring Physician:  Arun Dye MD    TIME STROKE TEAM CALLED: 12:50 CST     TIME PATIENT SEEN: 12:52 CST    Handedness: Right  Race: White     Chief Complaint/Reason for Consultation: Left-sided numbness and blurred vision    HPI:  Pt is a 26-yr-old right-handed white male with known diagnosis of hypertension, DM2, morbid obesity, JESUS, and asthma who presented with left-side numbness and blurred vision. Pt stated he is numb on his left face, arm, and leg and left eye blurriness. Upon exam he is alert and oriented X4, strengths are equal, states numbness to left face, arm, and leg, visual field is intact but states mild left eye blurriness.      Last Known Normal Date/Time: 11:30    Review of Systems   HENT: Negative.    Eyes: Positive for visual disturbance.   Respiratory: Negative.    Cardiovascular: Negative.    Gastrointestinal: Negative.    Genitourinary: Negative.    Musculoskeletal: Negative.    Skin: Negative.    Neurological: Positive for numbness.   Psychiatric/Behavioral: Negative.         Temp:  [97.5 °F (36.4 °C)] 97.5 °F (36.4 °C)  Heart Rate:  [120] 120  Resp:  [20] 20  BP: (149)/(104) 149/104    Neurological Exam  Mental Status  Awake, alert and oriented to person, place and time.Alert. Speech is normal. Language is fluent with no aphasia. Attention and concentration are normal.    Cranial Nerves  CN II: Mild left blurriness. Visual fields full to confrontation.  CN III, IV, VI: Extraocular movements intact bilaterally. Normal lids and orbits bilaterally. Pupils equal round and reactive to light bilaterally.  CN V:  Left: Diminished sensation of the entire left side of the face.  CN VII: Full and symmetric facial movement.  CN IX, X: Palate elevates symmetrically. Normal gag reflex.  CN XI: Shoulder shrug strength is normal.  CN XII:  Tongue midline without atrophy or fasciculations.    Motor  Normal muscle bulk throughout. No fasciculations present. Strength is 5/5 throughout all four extremities.    Sensory  Light touch abnormality: Left face, arm, and leg.     Reflexes  Not assessed.    Coordination    Finger-to-nose, rapid alternating movements and heel-to-shin normal bilaterally without dysmetria.    Gait  Normal casual, toe, heel and tandem gait.      Physical Exam  Vitals and nursing note reviewed.   HENT:      Head: Normocephalic and atraumatic.   Eyes:      General: Lids are normal.      Extraocular Movements: Extraocular movements intact.      Pupils: Pupils are equal, round, and reactive to light.   Cardiovascular:      Rate and Rhythm: Normal rate.   Pulmonary:      Effort: Pulmonary effort is normal.   Musculoskeletal:         General: Normal range of motion.      Cervical back: Normal range of motion.   Skin:     General: Skin is warm and dry.   Neurological:      General: No focal deficit present.      Mental Status: He is alert.      Coordination: Coordination is intact.      Deep Tendon Reflexes: Strength normal.   Psychiatric:         Mood and Affect: Mood normal.         Speech: Speech normal.         Acute Stroke Data    Alteplase (tPA) Inclusion / Exclusion Criteria    Time: 14:06 CST  Person Administering Scale: WILFREDO Velasquez    Inclusion Criteria  [x]   18 years of age or greater   []   Onset of symptoms < 4.5 hours before beginning treatment (stroke onset = time patient was last seen well or without symptoms).   []   Diagnosis of acute ischemic stroke causing measurable disabling deficit (Complete Hemianopia, Any Aphasia, Visual or Sensory Extinction, Any weakness limiting sustained effort against gravity)   []   Any remaining deficit considered potentially disabling in view of patient and practitioner   Exclusion criteria (Do not proceed with Alteplase if any are checked under exclusion criteria)  []   Onset unknown  or GREATER than 4.5 hours   []   ICH on CT/MRI   []   CT demonstrates hypodensity representing acute or subacute infarct   []   Significant head trauma or prior stroke in the previous 3 months   []   Symptoms suggestive of subarachnoid hemorrhage   []   History of un-ruptured intracranial aneurysm GREATER than 10 mm   []   Recent intracranial or intraspinal surgery within the last 3 months   []   Arterial puncture at a non-compressible site in the previous 7 days   []   Active internal bleeding   []   Acute bleeding tendency   []   Platelet count LESS than 100,000 for known hematological diseases such as leukemia, thrombocytopenia or chronic cirrhosis   []   Current use of anticoagulant with INR GREATER than 1.7 or PT GREATER than 15 seconds, aPTT GREATER than 40 seconds   []   Heparin received within 48 hours, resulting in abnormally elevated aPTT GREATER than upper limit of normal   []   Current use of direct thrombin inhibitors or direct factor Xa inhibitors in the past 48 hours   []   Elevated blood pressure refractory to treatment (systolic GREATER than 185 mm/Hg or diastolic  GREATER than 110 mm/Hg   []   Suspected infective endocarditis and aortic arch dissection   []   Current use of therapeutic treatment dose of low-molecular-weight heparin (LMWH) within the previous 24 hours   []   Structural GI malignancy or bleed   Relative exclusion for all patients  [x]   Only minor non-disabling symptoms   []   Pregnancy   []   Seizure at onset with postictal residual neurological impairments   []   Major surgery or previous trauma within past 14 days   []   History of previous spontaneous ICH, intracranial neoplasm, or AV malformation   []   Postpartum (within previous 14 days)   []   Recent GI or urinary tract hemorrhage (within previous 21 days)   []   Recent acute MI (within previous 3 months)   []   History of un-ruptured intracranial aneurysm LESS than 10 mm   []   History of ruptured intracranial aneurysm   []    Blood glucose LESS than 50 mg/dL (2.7 mmol/L)   []   Dural puncture within the last 7 days   []   Known GREATER than 10 cerebral microbleeds   Additional exclusions for patients with symptoms onset between 3 and 4.5 hours.  []   Age > 80.   []   On any anticoagulants regardless of INR  >>> Warfarin (Coumadin), Heparin, Enoxaparin (Lovenox), fondaparinux (Arixtra), bivalirudin (Angiomax), Argatroban, dabigatran (Pradaxa), rivaroxaban (Xarelto), or apixaban (Eliquis)   []   Severe stroke (NIHSS > 25).   []   History of BOTH diabetes and previous ischemic stroke.   []   The risks and benefits have been discussed with the patient or family related to the administration of IV Alteplase for stroke symptoms.   []   I have discussed and reviewed the patient's case and imaging with the attending prior to IV Alteplase.   N/A Time Alteplase administered       Past Medical History:   Diagnosis Date   • Asthma    • Carpal tunnel syndrome    • Depression    • Diabetes mellitus (HCC)    • Hypertension    • Morbid obesity (HCC)    • Osteoarthritis    • PTSD (post-traumatic stress disorder)      Past Surgical History:   Procedure Laterality Date   • CHOLECYSTECTOMY       Family History   Problem Relation Age of Onset   • Hypertension Father      Social History     Socioeconomic History   • Marital status: Single   Tobacco Use   • Smoking status: Former Smoker     Packs/day: 1.00     Types: Cigarettes   • Smokeless tobacco: Current User     Types: Chew   Vaping Use   • Vaping Use: Never used   Substance and Sexual Activity   • Alcohol use: Not Currently   • Drug use: Never   • Sexual activity: Not Currently     Allergies   Allergen Reactions   • Lorcet [Hydrocodone-Acetaminophen] Mental Status Change   • Amoxicillin Rash   • Penicillins Rash     redness     Prior to Admission medications    Medication Sig Start Date End Date Taking? Authorizing Provider   albuterol sulfate HFA (Ventolin HFA) 108 (90 Base) MCG/ACT inhaler Inhale 2  puffs Every 4 (Four) Hours As Needed for Wheezing. 2/9/22   Consuelo Mcneal MD   aspirin  MG tablet Take 1 tablet by mouth Daily for 120 days. 2/9/22 6/9/22  Consuelo Mcneal MD   atorvastatin (Lipitor) 40 MG tablet Take 1 tablet by mouth Daily for 120 days. 2/9/22 6/9/22  Consuelo Mcneal MD   Blood Glucose Monitoring Suppl (D-Care Glucometer) w/Device kit 1 kit 3 (Three) Times a Day Before Meals. 2/9/22   Consuelo Mcneal MD   famotidine (PEPCID) 20 MG tablet Take 1 tablet by mouth Daily for 120 days. 2/9/22 6/9/22  Consuelo Mcneal MD   losartan (COZAAR) 50 MG tablet Take 1 tablet by mouth Daily. 2/9/22   Consuelo Mcneal MD   metFORMIN ER (GLUCOPHAGE-XR) 500 MG 24 hr tablet Take 2 tablets by mouth 2 (Two) Times a Day Before Meals for 120 days. 2/9/22 6/9/22  Consuelo Mcneal MD   montelukast (SINGULAIR) 10 MG tablet Take 1 tablet by mouth Every Night for 30 days. 2/9/22 3/11/22  Consuelo Mcneal MD   nystatin (MYCOSTATIN) 907028 UNIT/GM powder Apply  topically to the appropriate area as directed 2 (Two) Times a Day. 2/9/22   Consuelo Mcneal MD   sulfamethoxazole-trimethoprim (BACTRIM DS,SEPTRA DS) 800-160 MG per tablet Take 1 tablet by mouth 2 (Two) Times a Day. 2/19/22   Antonio Ye MD       [facility] Meds:  Scheduled- lactated ringers, 1,000 mL, Intravenous, Once      Infusions-     PRNs- sodium chloride    Functional Status Prior to Current Stroke/[place] Score: 0    NIH Stroke Scale  Time: 14:06 CST  Person Administering Scale: WILFREDO Velasquez    1a  Level of consciousness: 0=alert; keenly responsive   1b. LOC questions:  0=Performs both tasks correctly   1c. LOC commands: 0=Performs both tasks correctly   2.  Best Gaze: 0=normal   3.  Visual: 0=No visual loss   4. Facial Palsy: 0=Normal symmetric movement   5a.  Motor left arm: 0=No drift, limb holds 90 (or 45) degrees for full 10 seconds   5b.  Motor right arm: 0=No drift, limb holds 90 (or 45)  degrees for full 10 seconds   6a. motor left le=No drift, limb holds 90 (or 45) degrees for full 10 seconds   6b  Motor right le=No drift, limb holds 90 (or 45) degrees for full 10 seconds   7. Limb Ataxia: 0=Absent   8.  Sensory: 1=Mild to moderate sensory loss; patient feels pinprick is less sharp or is dull on the affected side; there is a loss of superficial pain with pinprick but patient is aware He is being touched   9. Best Language:  0=No aphasia, normal   10. Dysarthria: 0=Normal   11. Extinction and Inattention: 0=No abnormality    Total:   1       Results Reviewed:  I have personally reviewed current lab, radiology, and data and agree with results.  Lab Results (last 24 hours)     Procedure Component Value Units Date/Time    CBC & Differential [[id number]]  (Abnormal) Collected: 22    Specimen: Blood Updated: 22    Narrative:      The following orders were created for panel order CBC & Differential.  Procedure                               Abnormality         Status                     ---------                               -----------         ------                     CBC Auto Differential[[id number]]        Abnormal            Final result                 Please view results for these tests on the individual orders.    CBC Auto Differential [[id number]]  (Abnormal) Collected: 22    Specimen: Blood Updated: 22     WBC 10.84 10*3/mm3      RBC 5.71 10*6/mm3      Hemoglobin 15.8 g/dL      Hematocrit 47.0 %      MCV 82.3 fL      MCH 27.7 pg      MCHC 33.6 g/dL      RDW 14.2 %      RDW-SD 41.4 fl      MPV 10.4 fL      Platelets 328 10*3/mm3      Neutrophil % 60.2 %      Lymphocyte % 29.7 %      Monocyte % 5.4 %      Eosinophil % 3.5 %      Basophil % 0.6 %      Immature Grans % 0.6 %      Neutrophils, Absolute 6.54 10*3/mm3      Lymphocytes, Absolute 3.22 10*3/mm3      Monocytes, Absolute 0.58 10*3/mm3      Eosinophils, Absolute 0.38 10*3/mm3      Basophils,  Absolute 0.06 10*3/mm3      Immature Grans, Absolute 0.06 10*3/mm3      nRBC 0.0 /100 WBC     Extra Tubes [[id number]] Collected: 03/07/22 1357    Specimen: Blood, Venous Line Updated: 03/07/22 1357    Narrative:      The following orders were created for panel order Extra Tubes.  Procedure                               Abnormality         Status                     ---------                               -----------         ------                     [place] Top[[id number]]                                         In process                   Please view results for these tests on the individual orders.    Gray Top [[id number]] Collected: 03/07/22 1357    Specimen: Blood Updated: 03/07/22 1357    Lavender Top [[id number]] Collected: 03/07/22 1350    Specimen: Blood Updated: 03/07/22 1356    Comprehensive Metabolic Panel [[id number]] Collected: 03/07/22 1350    Specimen: Blood Updated: 03/07/22 1356    Troponin [[id number]] Collected: 03/07/22 1350    Specimen: Blood Updated: 03/07/22 1356    Green Top (Gel) [[id number]] Collected: 03/07/22 1350    Specimen: Blood Updated: 03/07/22 1356    Gold Top - SST [[id number]] Collected: 03/07/22 1350    Specimen: Blood Updated: 03/07/22 1356    [place] Draw [[id number]] Collected: 03/07/22 1350    Specimen: Blood Updated: 03/07/22 1356    Narrative:      The following orders were created for panel order [place] Draw.  Procedure                               Abnormality         Status                     ---------                               -----------         ------                     Green Top (Gel)[[id number]]                                  In process                 Lavender Top[[id number]]                                     In process                 Gold Top - SST[[id number]]                                   In process                 Light Blue Top[[id number]]                                   In process                   Please view results for these tests on the individual  orders.    Protime-INR [[id number]] Collected: 03/07/22 1350    Specimen: Blood Updated: 03/07/22 1356    aPTT [[id number]] Collected: 03/07/22 1350    Specimen: Blood Updated: 03/07/22 1356    Light Blue Top [[id number]] Collected: 03/07/22 1350    Specimen: Blood Updated: 03/07/22 1356        Imaging Results (Last 24 Hours)     Procedure Component Value Units Date/Time    XR Chest 1 View [[id number]] Collected: 03/07/22 1357     Updated: 03/07/22 1400    Narrative:      Chest x-ray single view.         CLINICAL INDICATION: Shortness of breath. Acute stroke protocol.    COMPARISON: June 20, 2021.    FINDINGS: Cardiac silhouette is normal in size. Pulmonary  vascularity is unremarkable.     No focal infiltrate or consolidation.  No pleural effusion.  No  pneumothorax.      Impression:      No evidence of active disease.    Electronically signed by:  Jonnie Jordan MD  3/7/2022 1:57 PM CST  Workstation: [id number]    CT Head Without Contrast Stroke Protocol [[id number]] Collected: 03/07/22 1256     Updated: 03/07/22 1325    Narrative:        PROCEDURE: CT head without contrast    REASON FOR EXAM: Stroke, follow up    This exam was performed according to our departmental  dose-optimization program, which includes automated exposure  control, adjustment of the mA and/or kV according to patient size  and/or use of iterative reconstruction technique.    FINDINGS: Comparison study dated June 21, 2021. Axial computer  tomography sequential imaging of the head was performed from the  vertex to the base of the skull. .Sagittal and coronal  reformation was performed .    The skull vault is intact. Paranasal sinuses and bilateral  mastoid air cells are well aerated. Cerebral and cerebellar  parenchymal are normal. Ventricular system and subarachnoid  spaces are normal.      Impression:      Normal CT of the head.    Electronically signed by:  Parish Huynh MD  3/7/2022 1:22 PM CST  Workstation: [id number]    CT CEREBRAL PERFUSION WITH &  WITHOUT CONTRAST [[id number]] Resulted: 03/07/22 1308     Updated: 03/07/22 1317    CT Angiogram Neck [[id number]] Resulted: 03/07/22 1307     Updated: 03/07/22 1311    CT Angiogram Head w AI Analysis of LVO [[id number]] Resulted: 03/07/22 1307     Updated: 03/07/22 1311        Results for orders placed during the hospital encounter of 06/20/21    Adult Transthoracic Echo Complete W/ Cont if Necessary Per Protocol    Interpretation Summary  · Left ventricular ejection fraction appears to be 66 - 70%.  · Left ventricular diastolic function was normal.  · Estimated right ventricular systolic pressure from tricuspid regurgitation is normal (<35 mmHg).      Assessment/Plan: Pt is a 26-yr-old right-handed white male with known diagnosis of hypertension, DM2, morbid obesity, JESUS, and asthma who presented with left-side numbness and blurred vision. Pt stated he is numb on his left face, arm, and leg and left eye blurriness. Upon exam he is alert and oriented X4, strengths are equal, states numbness to left face, arm, and leg, visual field is intact but states mild left eye blurriness.    1. Left-sided numbness- Improving, he is in the window for TPA but symptoms are nondisabling and improving since arriving. CT neg for stroke, CTA and echo pending. Will start stroke order set, check A1C and lipid panel. Continue aspirin 325 mg/day and Lipitor 80 mg.   2. Hypertension- Allow permissive hypertension today. Normalize BP tomorrow.   3. DM2- Will check A1C, glucose was 346 upon arrival.  4. Activity- Bedrest today. Can work with PT/OT tomorrow.  5. Diet- ADA heart-healthy diet.  Case was discussed with pt, Dr. Feldman, ER physician, and nursing. Thank you for the consult.          No diagnosis found.        Jorden Cameron, WILFREDO  March 7, 2022  14:06 CST

## 2022-03-08 ENCOUNTER — APPOINTMENT (OUTPATIENT)
Dept: CARDIOLOGY | Facility: HOSPITAL | Age: 27
End: 2022-03-08

## 2022-03-08 ENCOUNTER — APPOINTMENT (OUTPATIENT)
Dept: CT IMAGING | Facility: HOSPITAL | Age: 27
End: 2022-03-08

## 2022-03-08 LAB
ALBUMIN SERPL-MCNC: 3.8 G/DL (ref 3.5–5.2)
ALBUMIN/GLOB SERPL: 1.3 G/DL
ALP SERPL-CCNC: 87 U/L (ref 39–117)
ALT SERPL W P-5'-P-CCNC: 32 U/L (ref 1–41)
ANION GAP SERPL CALCULATED.3IONS-SCNC: 11 MMOL/L (ref 5–15)
AST SERPL-CCNC: 16 U/L (ref 1–40)
BASOPHILS # BLD AUTO: 0.04 10*3/MM3 (ref 0–0.2)
BASOPHILS NFR BLD AUTO: 0.4 % (ref 0–1.5)
BH CV ECHO MEAS - ACS: 2.4 CM
BH CV ECHO MEAS - AO MAX PG (FULL): 0.17 MMHG
BH CV ECHO MEAS - AO MAX PG: 3 MMHG
BH CV ECHO MEAS - AO MEAN PG (FULL): -1 MMHG
BH CV ECHO MEAS - AO MEAN PG: 1 MMHG
BH CV ECHO MEAS - AO ROOT AREA (BSA CORRECTED): 1.4
BH CV ECHO MEAS - AO ROOT AREA: 10.8 CM^2
BH CV ECHO MEAS - AO ROOT DIAM: 3.7 CM
BH CV ECHO MEAS - AO V2 MAX: 87.3 CM/SEC
BH CV ECHO MEAS - AO V2 MEAN: 50.3 CM/SEC
BH CV ECHO MEAS - AO V2 VTI: 14.6 CM
BH CV ECHO MEAS - ASC AORTA: 3.5 CM
BH CV ECHO MEAS - AVA(I,A): 6.4 CM^2
BH CV ECHO MEAS - AVA(I,D): 6.4 CM^2
BH CV ECHO MEAS - AVA(V,A): 4.8 CM^2
BH CV ECHO MEAS - AVA(V,D): 4.8 CM^2
BH CV ECHO MEAS - BSA(HAYCOCK): 2.9 M^2
BH CV ECHO MEAS - BSA: 2.6 M^2
BH CV ECHO MEAS - BZI_BMI: 57.8 KILOGRAMS/M^2
BH CV ECHO MEAS - BZI_METRIC_HEIGHT: 170.2 CM
BH CV ECHO MEAS - BZI_METRIC_WEIGHT: 167.4 KG
BH CV ECHO MEAS - EDV(CUBED): 99.9 ML
BH CV ECHO MEAS - EDV(MOD-SP2): 98 ML
BH CV ECHO MEAS - EDV(MOD-SP4): 111 ML
BH CV ECHO MEAS - EDV(TEICH): 99.3 ML
BH CV ECHO MEAS - EF(CUBED): 75.3 %
BH CV ECHO MEAS - EF(MOD-SP2): 59.8 %
BH CV ECHO MEAS - EF(MOD-SP4): 54 %
BH CV ECHO MEAS - EF(TEICH): 67.3 %
BH CV ECHO MEAS - ESV(CUBED): 24.6 ML
BH CV ECHO MEAS - ESV(MOD-SP2): 39.4 ML
BH CV ECHO MEAS - ESV(MOD-SP4): 51.1 ML
BH CV ECHO MEAS - ESV(TEICH): 32.5 ML
BH CV ECHO MEAS - FS: 37.3 %
BH CV ECHO MEAS - IVS/LVPW: 1.1
BH CV ECHO MEAS - IVSD: 1.5 CM
BH CV ECHO MEAS - LA DIMENSION: 3.4 CM
BH CV ECHO MEAS - LA/AO: 0.92
BH CV ECHO MEAS - LV DIASTOLIC VOL/BSA (35-75): 42.3 ML/M^2
BH CV ECHO MEAS - LV MASS(C)D: 251.9 GRAMS
BH CV ECHO MEAS - LV MASS(C)DI: 96 GRAMS/M^2
BH CV ECHO MEAS - LV MAX PG: 2.9 MMHG
BH CV ECHO MEAS - LV MEAN PG: 2 MMHG
BH CV ECHO MEAS - LV SYSTOLIC VOL/BSA (12-30): 19.5 ML/M^2
BH CV ECHO MEAS - LV V1 MAX: 84.9 CM/SEC
BH CV ECHO MEAS - LV V1 MEAN: 69.9 CM/SEC
BH CV ECHO MEAS - LV V1 VTI: 18.9 CM
BH CV ECHO MEAS - LVIDD: 4.6 CM
BH CV ECHO MEAS - LVIDS: 2.9 CM
BH CV ECHO MEAS - LVLD AP2: 9.1 CM
BH CV ECHO MEAS - LVLD AP4: 9.4 CM
BH CV ECHO MEAS - LVLS AP2: 7.1 CM
BH CV ECHO MEAS - LVLS AP4: 7.9 CM
BH CV ECHO MEAS - LVOT AREA (M): 4.9 CM^2
BH CV ECHO MEAS - LVOT AREA: 4.9 CM^2
BH CV ECHO MEAS - LVOT DIAM: 2.5 CM
BH CV ECHO MEAS - LVPWD: 1.3 CM
BH CV ECHO MEAS - MV A MAX VEL: 63 CM/SEC
BH CV ECHO MEAS - MV DEC SLOPE: 651 CM/SEC^2
BH CV ECHO MEAS - MV E MAX VEL: 87.4 CM/SEC
BH CV ECHO MEAS - MV E/A: 1.4
BH CV ECHO MEAS - MV MAX PG: 4.1 MMHG
BH CV ECHO MEAS - MV MEAN PG: 1 MMHG
BH CV ECHO MEAS - MV P1/2T MAX VEL: 98.8 CM/SEC
BH CV ECHO MEAS - MV P1/2T: 44.5 MSEC
BH CV ECHO MEAS - MV V2 MAX: 101 CM/SEC
BH CV ECHO MEAS - MV V2 MEAN: 52.1 CM/SEC
BH CV ECHO MEAS - MV V2 VTI: 16.3 CM
BH CV ECHO MEAS - MVA P1/2T LCG: 2.2 CM^2
BH CV ECHO MEAS - MVA(P1/2T): 4.9 CM^2
BH CV ECHO MEAS - MVA(VTI): 5.7 CM^2
BH CV ECHO MEAS - PA MAX PG: 3.1 MMHG
BH CV ECHO MEAS - PA V2 MAX: 88.2 CM/SEC
BH CV ECHO MEAS - RAP SYSTOLE: 5 MMHG
BH CV ECHO MEAS - RVDD: 2.8 CM
BH CV ECHO MEAS - RVSP: 19.4 MMHG
BH CV ECHO MEAS - SI(AO): 59.8 ML/M^2
BH CV ECHO MEAS - SI(CUBED): 28.7 ML/M^2
BH CV ECHO MEAS - SI(LVOT): 35.4 ML/M^2
BH CV ECHO MEAS - SI(MOD-SP2): 22.3 ML/M^2
BH CV ECHO MEAS - SI(MOD-SP4): 22.8 ML/M^2
BH CV ECHO MEAS - SI(TEICH): 25.5 ML/M^2
BH CV ECHO MEAS - SV(AO): 157 ML
BH CV ECHO MEAS - SV(CUBED): 75.3 ML
BH CV ECHO MEAS - SV(LVOT): 92.8 ML
BH CV ECHO MEAS - SV(MOD-SP2): 58.6 ML
BH CV ECHO MEAS - SV(MOD-SP4): 59.9 ML
BH CV ECHO MEAS - SV(TEICH): 66.8 ML
BH CV ECHO MEAS - TR MAX VEL: 190 CM/SEC
BILIRUB SERPL-MCNC: 0.8 MG/DL (ref 0–1.2)
BUN SERPL-MCNC: 13 MG/DL (ref 6–20)
BUN/CREAT SERPL: 16.9 (ref 7–25)
CALCIUM SPEC-SCNC: 8.8 MG/DL (ref 8.6–10.5)
CHLORIDE SERPL-SCNC: 98 MMOL/L (ref 98–107)
CHOLEST SERPL-MCNC: 223 MG/DL (ref 0–200)
CO2 SERPL-SCNC: 25 MMOL/L (ref 22–29)
CREAT SERPL-MCNC: 0.77 MG/DL (ref 0.76–1.27)
DEPRECATED RDW RBC AUTO: 43.3 FL (ref 37–54)
EGFRCR SERPLBLD CKD-EPI 2021: 125.8 ML/MIN/1.73
EOSINOPHIL # BLD AUTO: 0.42 10*3/MM3 (ref 0–0.4)
EOSINOPHIL NFR BLD AUTO: 4.4 % (ref 0.3–6.2)
ERYTHROCYTE [DISTWIDTH] IN BLOOD BY AUTOMATED COUNT: 14.3 % (ref 12.3–15.4)
GLOBULIN UR ELPH-MCNC: 2.9 GM/DL
GLUCOSE BLDC GLUCOMTR-MCNC: 228 MG/DL (ref 70–130)
GLUCOSE BLDC GLUCOMTR-MCNC: 287 MG/DL (ref 70–130)
GLUCOSE BLDC GLUCOMTR-MCNC: 449 MG/DL (ref 70–130)
GLUCOSE SERPL-MCNC: 336 MG/DL (ref 65–99)
HBA1C MFR BLD: 12.9 % (ref 4.8–5.6)
HCT VFR BLD AUTO: 42.7 % (ref 37.5–51)
HDLC SERPL-MCNC: 28 MG/DL (ref 40–60)
HGB BLD-MCNC: 14 G/DL (ref 13–17.7)
IMM GRANULOCYTES # BLD AUTO: 0.04 10*3/MM3 (ref 0–0.05)
IMM GRANULOCYTES NFR BLD AUTO: 0.4 % (ref 0–0.5)
LDLC SERPL CALC-MCNC: 108 MG/DL (ref 0–100)
LDLC/HDLC SERPL: 3.37 {RATIO}
LYMPHOCYTES # BLD AUTO: 2.9 10*3/MM3 (ref 0.7–3.1)
LYMPHOCYTES NFR BLD AUTO: 30.2 % (ref 19.6–45.3)
MAXIMAL PREDICTED HEART RATE: 193 BPM
MCH RBC QN AUTO: 27.5 PG (ref 26.6–33)
MCHC RBC AUTO-ENTMCNC: 32.8 G/DL (ref 31.5–35.7)
MCV RBC AUTO: 83.9 FL (ref 79–97)
MONOCYTES # BLD AUTO: 0.57 10*3/MM3 (ref 0.1–0.9)
MONOCYTES NFR BLD AUTO: 5.9 % (ref 5–12)
NEUTROPHILS NFR BLD AUTO: 5.64 10*3/MM3 (ref 1.7–7)
NEUTROPHILS NFR BLD AUTO: 58.7 % (ref 42.7–76)
NRBC BLD AUTO-RTO: 0 /100 WBC (ref 0–0.2)
PLATELET # BLD AUTO: 275 10*3/MM3 (ref 140–450)
PMV BLD AUTO: 10.4 FL (ref 6–12)
POTASSIUM SERPL-SCNC: 4 MMOL/L (ref 3.5–5.2)
PROT SERPL-MCNC: 6.7 G/DL (ref 6–8.5)
RBC # BLD AUTO: 5.09 10*6/MM3 (ref 4.14–5.8)
SODIUM SERPL-SCNC: 134 MMOL/L (ref 136–145)
STRESS TARGET HR: 164 BPM
TRIGL SERPL-MCNC: 503 MG/DL (ref 0–150)
VLDLC SERPL-MCNC: 87 MG/DL (ref 5–40)
WBC NRBC COR # BLD: 9.61 10*3/MM3 (ref 3.4–10.8)

## 2022-03-08 PROCEDURE — 80061 LIPID PANEL: CPT | Performed by: NURSE PRACTITIONER

## 2022-03-08 PROCEDURE — 80053 COMPREHEN METABOLIC PANEL: CPT | Performed by: NURSE PRACTITIONER

## 2022-03-08 PROCEDURE — 63710000001 INSULIN DETEMIR PER 5 UNITS: Performed by: HOSPITALIST

## 2022-03-08 PROCEDURE — 82962 GLUCOSE BLOOD TEST: CPT

## 2022-03-08 PROCEDURE — 63710000001 INSULIN ASPART PER 5 UNITS: Performed by: NURSE PRACTITIONER

## 2022-03-08 PROCEDURE — 85025 COMPLETE CBC W/AUTO DIFF WBC: CPT | Performed by: NURSE PRACTITIONER

## 2022-03-08 PROCEDURE — 97167 OT EVAL HIGH COMPLEX 60 MIN: CPT

## 2022-03-08 PROCEDURE — 93306 TTE W/DOPPLER COMPLETE: CPT

## 2022-03-08 PROCEDURE — 83036 HEMOGLOBIN GLYCOSYLATED A1C: CPT | Performed by: NURSE PRACTITIONER

## 2022-03-08 PROCEDURE — G0378 HOSPITAL OBSERVATION PER HR: HCPCS

## 2022-03-08 PROCEDURE — 97161 PT EVAL LOW COMPLEX 20 MIN: CPT

## 2022-03-08 PROCEDURE — 25010000002 ENOXAPARIN PER 10 MG: Performed by: NURSE PRACTITIONER

## 2022-03-08 PROCEDURE — 99213 OFFICE O/P EST LOW 20 MIN: CPT | Performed by: NURSE PRACTITIONER

## 2022-03-08 PROCEDURE — 93306 TTE W/DOPPLER COMPLETE: CPT | Performed by: INTERNAL MEDICINE

## 2022-03-08 PROCEDURE — 96372 THER/PROPH/DIAG INJ SC/IM: CPT

## 2022-03-08 PROCEDURE — 70450 CT HEAD/BRAIN W/O DYE: CPT

## 2022-03-08 RX ADMIN — Medication 10 ML: at 09:00

## 2022-03-08 RX ADMIN — ASPIRIN 325 MG: 325 TABLET ORAL at 09:27

## 2022-03-08 RX ADMIN — Medication 10 ML: at 01:31

## 2022-03-08 RX ADMIN — Medication 10 ML: at 21:05

## 2022-03-08 RX ADMIN — MONTELUKAST 10 MG: 10 TABLET, FILM COATED ORAL at 21:05

## 2022-03-08 RX ADMIN — INSULIN DETEMIR 10 UNITS: 100 INJECTION, SOLUTION SUBCUTANEOUS at 21:05

## 2022-03-08 RX ADMIN — INSULIN ASPART 8 UNITS: 100 INJECTION, SOLUTION INTRAVENOUS; SUBCUTANEOUS at 16:39

## 2022-03-08 RX ADMIN — ATORVASTATIN CALCIUM 80 MG: 40 TABLET, FILM COATED ORAL at 21:05

## 2022-03-08 RX ADMIN — FAMOTIDINE 20 MG: 20 TABLET ORAL at 09:27

## 2022-03-08 RX ADMIN — ACETAMINOPHEN 650 MG: 325 TABLET, FILM COATED ORAL at 10:04

## 2022-03-08 RX ADMIN — ENOXAPARIN SODIUM 40 MG: 40 INJECTION SUBCUTANEOUS at 01:30

## 2022-03-08 RX ADMIN — ENOXAPARIN SODIUM 40 MG: 40 INJECTION SUBCUTANEOUS at 23:52

## 2022-03-08 RX ADMIN — INSULIN ASPART 14 UNITS: 100 INJECTION, SOLUTION INTRAVENOUS; SUBCUTANEOUS at 12:32

## 2022-03-08 RX ADMIN — INSULIN ASPART 10 UNITS: 100 INJECTION, SOLUTION INTRAVENOUS; SUBCUTANEOUS at 05:44

## 2022-03-08 RX ADMIN — MONTELUKAST 10 MG: 10 TABLET, FILM COATED ORAL at 01:30

## 2022-03-08 NOTE — PLAN OF CARE
Goal Outcome Evaluation:  Plan of Care Reviewed With: patient           Outcome Evaluation: OT eval complete, co-eval with PT. Supine in bed upon arrival. Freddy UE WFL, strength = bilateteral UEs, L forearm sensation deminsished compared to R, but has been present since previous CVA. Patient c/o of headache, 8/10. Supine<>sit with modified independence. ADLs with independence (LE dressing, toileting, toilet transfer, and grooming). Patient independent in all ADLs, no further OT needed at this time. Recommend home with assist.

## 2022-03-08 NOTE — THERAPY EVALUATION
Patient Name: Venkatesh Parra  : 1995    MRN: [id number]                              Today's Date: 3/8/2022       Admit Date: 3/7/2022    Visit Dx:     ICD-10-CM ICD-9-CM   1. Stroke-like symptom  R29.90 781.99   2. Numbness  R20.0 782.0   3. Impaired mobility and ADLs  Z74.09 V49.89    Z78.9      Patient Active Problem List   Diagnosis   • Syncope and collapse   • Weakness   • Transient ischemic attack (TIA)   • Hypertension   • Morbid obesity (HCC)   • Diabetes mellitus (HCC)   • Left-sided back pain   • Stroke-like symptom     Past Medical History:   Diagnosis Date   • Asthma    • Carpal tunnel syndrome    • Depression    • Diabetes mellitus (HCC)    • Hypertension    • Morbid obesity (HCC)    • Osteoarthritis    • PTSD (post-traumatic stress disorder)      Past Surgical History:   Procedure Laterality Date   • CHOLECYSTECTOMY        General Information     [facility] Name 22          OT Time and Intention    Document Type evaluation  -     Mode of Treatment physical therapy;occupational therapy;co-treatment  -     Row Name 22          General Information    Patient Profile Reviewed yes  -     Prior Level of Function independent:;gait;transfer;bed mobility;ADL's;feeding;grooming;dressing;bathing;home management;cooking;cleaning;driving;shopping;using stairs;work  -     Existing Precautions/Restrictions fall  -[facility] Name 22          Living Environment    People in Home alone  -[facility] Name 22          Stairs Within Home, Primary    Stairs, Within Home, Primary apartment, threshold to get, tub with shower. Low toilet at home.  -     Row Name 22          Cognition    Orientation Status (Cognition) oriented x 4  -[facility] Name 22          Safety Issues, Functional Mobility    Impairments Affecting Function (Mobility) endurance/activity tolerance;sensation/sensory awareness  -           User Key  (r) = Recorded By, (t) = Taken  By, (c) = Cosigned By    Initials Name Provider Type    Darya Gómez OT Occupational Therapist                 Mobility/ADL's     Row Name 03/08/22 0925          Bed Mobility    Bed Mobility supine-sit  -     Supine-Sit [place] (Bed Mobility) modified independence  -     Assistive Device (Bed Mobility) bed rails;head of bed elevated  -[facility] Name 03/08/22 0925          Transfers    Transfers sit-stand transfer;toilet transfer  -     Comment, (Transfers) no AE  -     Sit-Stand [place] (Transfers) independent  -     [place] Level (Toilet Transfer) independent  -[facility] Name 03/08/22 0925          Toilet Transfer    Type (Toilet Transfer) sit-stand;stand-sit  -[facility] Name 03/08/22 0925          Activities of Daily Living    BADL Assessment/Intervention lower body dressing;toileting;grooming  -[facility] Name 03/08/22 0925          Lower Body Dressing Assessment/Training    [place] Level (Lower Body Dressing) don;doff;socks;independent  -     Position (Lower Body Dressing) edge of bed sitting  -[facility] Name 03/08/22 0925          Toileting Assessment/Training    [place] Level (Toileting) toileting skills;independent  -[facility] Name 03/08/22 0925          Grooming Assessment/Training    [place] Level (Grooming) grooming skills;independent  -           User Key  (r) = Recorded By, (t) = Taken By, (c) = Cosigned By    Initials Name Provider Type    Darya Gómez OT Occupational Therapist               Obj/Interventions     Row Name 03/08/22 0925          Sensory Assessment (Somatosensory)    Sensory Assessment (Somatosensory) right-sided sensation intact;left UE;other (see comments)  -     Sensory Assessment L forearm with deminished sensation compare to R to light touch, L hand with improved sensation compared to L forearm  -     Row Name 03/08/22 0925          Range of Motion Comprehensive    General Range of Motion bilateral upper extremity  ROM WNL  -     Row Name 03/08/22 0925          Strength Comprehensive (MMT)    Comment, General Manual Muscle Testing (MMT) Assessment BUE 5/5  -SJ           User Key  (r) = Recorded By, (t) = Taken By, (c) = Cosigned By    Initials Name Provider Type     Darya Noriega, ROHIT Occupational Therapist               Goals/Plan    No documentation.                Clinical Impression     [facility] Name 03/08/22 0925          Pain Assessment    Pretreatment Pain Rating 8/10  -SJ     Posttreatment Pain Rating 8/10  -SJ     Pain Location - Side/Orientation Right  -     Pain Location - head  -SJ     Pain Intervention(s) Repositioned;Ambulation/increased activity  -     Row Name 03/08/22 0925          Plan of Care Review    Plan of Care Reviewed With patient  -     Outcome Evaluation OT eval complete, co-eval with PT. Supine in bed upon arrival. Freddy UE WFL, strength = bilateteral UEs, L forearm sensation deminsished compared to R, but has been present since previous CVA. Patient c/o of headache, 8/10. Supine<>sit with modified independence. ADLs with independence (LE dressing, toileting, toilet transfer, and grooming). Patient independent in all ADLs, no further OT needed at this time. Recommend home with assist.  -     Row Name 03/08/22 0925          Therapy Assessment/Plan (OT)    Patient/Family Therapy Goal Statement (OT) return home  -     Rehab Potential (OT) good, to achieve stated therapy goals  -     Criteria for Skilled Therapeutic Interventions Met (OT) no  -     Therapy Frequency (OT) evaluation only  -     Row Name 03/08/22 0925          Therapy Plan Review/Discharge Plan (OT)    Anticipated Discharge Disposition (OT) home with assist  -[facility] Name 03/08/22 0925          Vital Signs    Pre Systolic BP Rehab 129  -SJ     Pre Treatment Diastolic BP 81  -SJ     Intra Systolic BP Rehab 143  -SJ     Intra Treatment Diastolic BP 66  -SJ     Post Systolic BP Rehab 178  -SJ     Post Treatment Diastolic BP  86  -SJ     Pretreatment Heart Rate (beats/min) 92  -SJ     Intratreatment Heart Rate (beats/min) 96  -SJ     Posttreatment Heart Rate (beats/min) 99  -SJ     Pre SpO2 (%) 96  -SJ     O2 Delivery Pre Treatment room air  -SJ     Intra SpO2 (%) 98  -SJ     O2 Delivery Intra Treatment room air  -SJ     Post SpO2 (%) 96  -SJ     O2 Delivery Post Treatment room air  -SJ     Pre Patient Position Supine  -SJ     Post Patient Position Supine  -     Row Name 03/08/22 0925          Positioning and Restraints    Pre-Treatment Position in bed  -SJ     Post Treatment Position bed  -SJ     In Bed notified nsg;sitting EOB;call light within reach  -           User Key  (r) = Recorded By, (t) = Taken By, (c) = Cosigned By    Initials Name Provider Type    Darya Gómez, ROHIT Occupational Therapist               Outcome Measures     Row Name 03/08/22 0925          How much help from another is currently needed...    Putting on and taking off regular lower body clothing? 4  -SJ     Bathing (including washing, rinsing, and drying) 4  -SJ     Toileting (which includes using toilet bed pan or urinal) 4  -SJ     Putting on and taking off regular upper body clothing 4  -SJ     Taking care of personal grooming (such as brushing teeth) 4  -SJ     Eating meals 4  -SJ     AM-PAC 6 Clicks Score (OT) 24  -     Row Name 03/08/22 0925          Modified Elliott Scale    Pre-Stroke Modified Elliott Scale 1 - No significant disability despite symptoms.  Able to carry out all usual duties and activities.  -SJ     Modified Majo Scale 1 - No significant disability despite symptoms.  Able to carry out all usual duties and activities.  -     Row Name 03/08/22 0925          Functional Assessment    Outcome Measure Options AM-PAC 6 Clicks Daily Activity (OT);Modified Majo  -           User Key  (r) = Recorded By, (t) = Taken By, (c) = Cosigned By    Initials Name Provider Type    Darya Gómez OT Occupational Therapist               The Barthel Index    Feeding          ___10__            0= Unable  5= Needs help cutting, spreading butter, etc. Or requires modified diet  10= Independent    Bathing         __5___  0= Dependent  5= Independent (or in shower)    Grooming          __5___  0= Needs to help with personal care  5= Independent face/hair/teehth/shaving (implements provided)    Dressing          __10___  0= Dependent  5= Needs help but can do about half unaided  10= Independent (including buttons, zippers, laces, etc.)    Bowels          __10___  0= Incontinent  5= Occasional accident  10= Continent    Bladder          __10___  0= Incontinent, or catheterized and unable to manage alone  5= Occasional accident  10= Continent    Toilet Use          ___10___  0= Unable  5= Needs some help, but can do something alone  10= Independent    Transfers           __15____  0= Unable, no sitting balance  5= Major help (one or two people, physical), can sit  10= Minor help  15= Independent    Mobility          __15___  0= Immobile or < 50 yards  5= Wheelchair independent, including corners, >50 yards  10= Walks with help of one person (verbal or physical) > 50 yards  15= Independent (but may use any aid, for example, stick ) > 50 yards    Stairs          _10____  0= Unable   5= Needs helps  10= Independent     Total Score: 100                 Occupational Therapy Education                 Title: PT OT SLP Therapies (In Progress)     Topic: Occupational Therapy (In Progress)     Point: ADL training (Not Started)     Description:   Instruct learner(s) on proper safety adaptation and remediation techniques during self care or transfers.   Instruct in proper use of assistive devices.              Learner Progress:  Not documented in this visit.          Point: Home exercise program (Not Started)     Description:   Instruct learner(s) on appropriate technique for monitoring, assisting and/or progressing therapeutic exercises/activities.               Learner Progress:  Not documented in this visit.          Point: Precautions (Done)     Description:   Instruct learner(s) on prescribed precautions during self-care and functional transfers.              Learning Progress Summary           Patient Acceptance, E,TB, VU by  at 3/8/2022 0942    Comment: POC, role of OT, transfer training                   Point: Body mechanics (Done)     Description:   Instruct learner(s) on proper positioning and spine alignment during self-care, functional mobility activities and/or exercises.              Learning Progress Summary           Patient Acceptance, E,TB, VU by  at 3/8/2022 0942    Comment: POC, role of OT, transfer training                               User Key     Initials Effective Dates Name Provider Type Discipline     06/14/21 -  Darya Noriega OT Occupational Therapist OT              OT Recommendation and Plan  Therapy Frequency (OT): evaluation only  Plan of Care Review  Plan of Care Reviewed With: patient  Outcome Evaluation: OT eval complete, co-eval with PT. Supine in bed upon arrival. Freddy UE WFL, strength = bilateteral UEs, L forearm sensation deminsished compared to R, but has been present since previous CVA. Patient c/o of headache, 8/10. Supine<>sit with modified independence. ADLs with independence (LE dressing, toileting, toilet transfer, and grooming). Patient independent in all ADLs, no further OT needed at this time. Recommend home with assist.     Time Calculation:    Time Calculation- OT     Row Name 03/08/22 1152             Time Calculation- OT    OT Start Time 0925  -      OT Stop Time 0949  -      OT Time Calculation (min) 24 min  -SJ      OT Received On 03/08/22  -      OT Goal Re-Cert Due Date 03/21/22  -              Untimed Charges    OT Eval/Re-eval Minutes 24  -SJ              Total Minutes    Untimed Charges Total Minutes 24  -SJ       Total Minutes 24  -SJ            User Key  (r) = Recorded By, (t) = Taken By, (c) =  Cosigned By    Initials Name Provider Type    SJ Darya Noriega, OT Occupational Therapist              Therapy Charges for Today     Code Description Service Date Service Provider Modifiers Qty    44201223214 HC OT EVAL HIGH COMPLEXITY 2 3/8/2022 Darya Noriega OT GO 1               Darya Noriega OT  3/8/2022

## 2022-03-08 NOTE — EXTERNAL PATIENT INSTRUCTIONS
Patient Education   Table of Contents       Carbohydrate Counting for Diabetes Mellitus, Adult       Diabetes Mellitus and Nutrition, Adult       Diabetes Mellitus Basics       Heart-Healthy Eating Plan     To view videos and all your education online visit,   [email or web address]   or scan this QR code with your smartphone.                Clinician Note   Please call with any questions regarding the information you have received:Food & [phone]       Carbohydrate Counting for Diabetes Mellitus, Adult     Carbohydrate counting is a method of keeping track of how many carbohydrates you eat. Eating carbohydrates naturally increases the amount of sugar (glucose) in the blood. Counting how many carbohydrates you eat improves your blood glucose control, which helps you manage your diabetes.   It is important to know how many carbohydrates you can safely have in each meal. This is different for every person. A dietitian can help you make a meal plan and calculate how many carbohydrates you should have at each meal and snack.   What foods contain carbohydrates?       Carbohydrates are found in the following foods:       Grains, such as breads and cereals.       Dried beans and soy products.       Starchy vegetables, such as potatoes, peas, and corn.       Fruit and fruit juices.       Milk and yogurt.       Sweets and snack foods, such as cake, cookies, candy, chips, and soft drinks.       How do I count carbohydrates in foods?   There are two ways to count carbohydrates in food. You can read food labels or learn standard serving sizes of foods. You can use either of the methods or a combination of both.   Using the Nutrition Facts label    The Nutrition Facts list is included on the labels of almost all packaged foods and beverages in the [place] It includes:       The serving size.       Information about nutrients in each serving, including the grams (g) of carbohydrate per serving.      To use the  Nutrition Facts:       Decide how many servings you will have.       Multiply the number of servings by the number of carbohydrates per serving.       The resulting number is the total amount of carbohydrates that you will be having.     Learning the standard serving sizes of foods    When you eat carbohydrate foods that are not packaged or do not include Nutrition Facts on the label, you need to measure the servings in order to count the amount of carbohydrates.       Measure the foods that you will eat with a food scale or measuring cup, if needed.       Decide how many standard-size servings you will eat.      Multiply the number of servings by 15. For foods that contain carbohydrates, one serving equals 15 g of carbohydrates.       For example, if you eat 2 cups or 10 oz (300 g) of strawberries, you will have eaten 2 servings and 30 g of carbohydrates (2 servings x 15 g = 30 g).       For foods that have more than one food mixed, such as soups and casseroles, you must count the carbohydrates in each food that is included.      The following list contains standard serving sizes of common carbohydrate-rich foods. Each of these servings has about 15 g of carbohydrates:       1 slice of bread.       1 six-inch (15 cm) tortilla.       ? cup or 2 oz (53 g) cooked rice or pasta.       ? cup or 3 oz (85 g) cooked or canned, drained and rinsed beans or lentils.       ? cup or 3 oz (85 g) starchy vegetable, such as peas, corn, or squash.       ? cup or 4 oz (120 g) hot cereal.       ? cup or 3 oz (85 g) boiled or mashed potatoes, or ? or 3 oz (85 g) of a large baked potato.       ? cup or 4 fl oz (118 mL) fruit juice.       1 cup or 8 fl oz (237 mL) milk.       1 small or 4 oz (106 g) apple.       ? or 2 oz (63 g) of a medium banana.       1 cup or 5 oz (150 g) strawberries.       3 cups or 1 oz (24 g) popped popcorn.     What is an example of carbohydrate counting?   To calculate the number of carbohydrates in this  sample meal, follow the steps shown below.   Sample meal         3 oz (85 g) chicken breast.       ? cup or 4 oz (106 g) brown rice.       ? cup or 3 oz (85 g) corn.       1 cup or 8 fl oz (237 mL) milk.       1 cup or 5 oz (150 g) strawberries with sugar-free whipped topping.     Carbohydrate calculation      Identify the foods that contain carbohydrates:       Rice.       Corn.       Milk.       Strawberries.      Calculate how many servings you have of each food:       2 servings rice.       1 serving corn.       1 serving milk.       1 serving strawberries.      Multiply each number of servings by 15  servings rice x 15 g = 30 g.       1 serving corn x 15 g = 15 g.       1 serving milk x 15 g = 15 g.       1 serving strawberries x 15 g = 15 g.      Add together all of the amounts to find the total grams of carbohydrates eaten:       30 g + 15 g + 15 g + 15 g = 75 g of carbohydrates total.   What are tips for following this plan?   Shopping         Develop a meal plan and then make a shopping list.       Buy fresh and frozen vegetables, fresh and frozen fruit, dairy, eggs, beans, lentils, and whole grains.       Look at food labels. Choose foods that have more fiber and less sugar.       Avoid processed foods and foods with added sugars.     Meal planning         Aim to have the same amount of carbohydrates at each meal and for each snack time.       Plan to have regular, balanced meals and snacks.     Where to find more information         American Diabetes Association: www.diabetes.org         Centers for Disease Control and Prevention: www.cdc.gov       Summary         Carbohydrate counting is a method of keeping track of how many carbohydrates you eat.       Eating carbohydrates naturally increases the amount of sugar (glucose) in the blood.       Counting how many carbohydrates you eat improves your blood glucose control, which helps you manage your diabetes.       A dietitian can help you make a  meal plan and calculate how many carbohydrates you should have at each meal and snack.     This information is not intended to replace advice given to you by your health care provider. Make sure you discuss any questions you have with your health care provider.     Document Released: 12/18/2006Document Revised: 12/17/2020Document Reviewed: 12/18/2020     [employer] Patient Education ? 2021 [employer]         Diabetes Mellitus and Nutrition, Adult     When you have diabetes, or diabetes mellitus, it is very important to have healthy eating habits because your blood sugar (glucose) levels are greatly affected by what you eat and drink. Eating healthy foods in the right amounts, at about the same times every day, can help you:       Control your blood glucose.       Lower your risk of heart disease.       Improve your blood pressure.       Reach or maintain a healthy weight.     What can affect my meal plan?    Every person with diabetes is different, and each person has different needs for a meal plan. Your health care provider may recommend that you work with a dietitian to make a meal plan that is best for you. Your meal plan may vary depending on factors such as:       The calories you need.       The medicines you take.       Your weight.       Your blood glucose, blood pressure, and cholesterol levels.       Your activity level.       Other health conditions you have, such as heart or kidney disease.     How do carbohydrates affect me?   Carbohydrates, also called carbs, affect your blood glucose level more than any other type of food. Eating carbs naturally raises the amount of glucose in your blood. Carb counting is a method for keeping track of how many carbs you eat. Counting carbs is important to keep your blood glucose at a healthy level, especially if you use insulin or take certain oral diabetes medicines.   It is important to know how many carbs you can safely have in each meal. This is different for  "every person. Your dietitian can help you calculate how many carbs you should have at each meal and for each snack.   How does alcohol affect me?    Alcohol can cause a sudden decrease in blood glucose (hypoglycemia), especially if you use insulin or take certain oral diabetes medicines. Hypoglycemia can be a life-threatening condition. Symptoms of hypoglycemia, such as sleepiness, dizziness, and confusion, are similar to symptoms of having too much alcohol.      Do not  drink alcohol if:       Your health care provider tells you not to drink.       You are pregnant, may be pregnant, or are planning to become pregnant.      If you drink alcohol:      Do not  drink on an empty stomach.      Limit how much you use to:       0?1 drink a day for women.       0?2 drinks a day for men.       Be aware of how much alcohol is in your drink. In the [place] one drink equals one 12 oz bottle of beer (355 mL), one 5 oz glass of wine (148 mL), or one 1? oz glass of hard liquor (44 mL).      Keep yourself hydrated with water, diet soda, or unsweetened iced tea.       Keep in mind that regular soda, juice, and other mixers may contain a lot of sugar and must be counted as carbs.         What are tips for following this plan?      Reading food labels         Start by checking the serving size on the \"Nutrition Facts\" label of packaged foods and drinks. The amount of calories, carbs, fats, and other nutrients listed on the label is based on one serving of the item. Many items contain more than one serving per package.       Check the total grams (g) of carbs in one serving. You can calculate the number of servings of carbs in one serving by dividing the total carbs by 15. For example, if a food has 30 g of total carbs per serving, it would be equal to 2 servings of carbs.       Check the number of grams (g) of saturated fats and trans fats in one serving. Choose foods that have a low amount or none of these fats.       Check the number " "of milligrams (mg) of salt (sodium) in one serving. Most people should limit total sodium intake to less than 2,300 mg per day.       Always check the nutrition information of foods labeled as \"low-fat\" or \"nonfat.\" These foods may be higher in added sugar or refined carbs and should be avoided.       Talk to your dietitian to identify your daily goals for nutrients listed on the label.     Shopping         Avoid buying canned, pre-made, or processed foods. These foods tend to be high in fat, sodium, and added sugar.       Shop around the outside edge of the grocery store. This is where you will most often find fresh fruits and vegetables, bulk grains, fresh meats, and fresh dairy.     Cooking         Use low-heat cooking methods, such as baking, instead of high-heat cooking methods like deep frying.       Cook using healthy oils, such as olive, canola, or sunflower oil.       Avoid cooking with butter, cream, or high-fat meats.     Meal planning         Eat meals and snacks regularly, preferably at the same times every day. Avoid going long periods of time without eating.       Eat foods that are high in fiber, such as fresh fruits, vegetables, beans, and whole grains. Talk with your dietitian about how many servings of carbs you can eat at each meal.      Eat 4?6 oz (112?168 g) of lean protein each day, such as lean meat, chicken, fish, eggs, or tofu. One ounce (oz) of lean protein is equal to:       1 oz (28 g) of meat, chicken, or fish.       1 egg.       ? cup (62 g) of tofu.       Eat some foods each day that contain healthy fats, such as avocado, nuts, seeds, and fish.       What foods should I eat?   Fruits   Berries. Apples. Oranges. Peaches. Apricots. Plums. Grapes. [place]. Papaya. Pomegranate. Kiwi. Cherries.   Vegetables   Lettuce. Spinach. Leafy greens, including kale, chard, [place] greens, and mustard greens. Beets. Cauliflower. Cabbage. Broccoli. Carrots. Green beans. Tomatoes. Peppers. Onions. " Cucumbers. [place] sprouts.   Grains   Whole grains, such as whole-wheat or whole-grain bread, crackers, tortillas, cereal, and pasta. Unsweetened oatmeal. Quinoa. Brown or wild rice.   Meats and other proteins   Seafood. Poultry without skin. Lean cuts of poultry and beef. Tofu. Nuts. Seeds.   Dairy   Low-fat or fat-free dairy products such as milk, yogurt, and cheese.   The items listed above may not be a complete list of foods and beverages you can eat. Contact a dietitian for more information.   What foods should I avoid?   Fruits   Fruits canned with syrup.   Vegetables   Canned vegetables. Frozen vegetables with butter or cream sauce.   Grains   Refined white flour and flour products such as bread, pasta, snack foods, and cereals. Avoid all processed foods.   Meats and other proteins   Fatty cuts of meat. Poultry with skin. Breaded or fried meats. Processed meat. Avoid saturated fats.   Dairy   Full-fat yogurt, cheese, or milk.   Beverages   Sweetened drinks, such as soda or iced tea.   The items listed above may not be a complete list of foods and beverages you should avoid. Contact a dietitian for more information.   Questions to ask a health care provider         Do I need to meet with a diabetes educator?       Do I need to meet with a dietitian?       What number can I call if I have questions?       When are the best times to check my blood glucose?     Where to find more information:         American Diabetes Association: diabetes.org         Academy of Nutrition and Dietetics: www.eatright.org         National [place] of Diabetes and Digestive and Kidney Diseases: www.niddk.nih.gov         Association of Diabetes Care and Education Specialists: www.diabeteseducator.org       Summary         It is important to have healthy eating habits because your blood sugar (glucose) levels are greatly affected by what you eat and drink.       A healthy meal plan will help you control your blood glucose and  maintain a healthy lifestyle.       Your health care provider may recommend that you work with a dietitian to make a meal plan that is best for you.       Keep in mind that carbohydrates (carbs) and alcohol have immediate effects on your blood glucose levels. It is important to count carbs and to use alcohol carefully.     This information is not intended to replace advice given to you by your health care provider. Make sure you discuss any questions you have with your health care provider.     Document Released: 09/14/2006Document Revised: 11/24/2020Document Reviewed: 11/24/2020     [employer] Patient Education ? 2021 [employer]         Diabetes Mellitus Basics        Diabetes mellitus, or diabetes, is a long-term (chronic) disease. It occurs when the body does not properly use sugar (glucose) that is released from food after you eat.    Diabetes mellitus may be caused by one or both of these problems:       Your pancreas does not make enough of a hormone called insulin.       Your body does not react in a normal way to the insulin that it makes.     Insulin lets glucose enter cells in your body. This gives you energy. If you have diabetes, glucose cannot get into cells. This causes high blood glucose (hyperglycemia).     How to treat and manage diabetes   You may need to take insulin or other diabetes medicines daily to keep your glucose in balance. If you are prescribed insulin, you will learn how to give yourself insulin by injection. You may need to adjust the amount of insulin you take based on the foods that you eat.   You will need to check your blood glucose levels using a glucose monitor as told by your health care provider. The readings can help determine if you have low or high blood glucose.    Generally, you should have these blood glucose levels:       Before meals (preprandial): 80?130 mg/dL (4.4?7.2 mmol/L).       After meals (postprandial): below 180 mg/dL (10 mmol/L).       Hemoglobin A1c (HbA1c)  level: less than 7%.     Your health care provider will set treatment goals for you.   Keep all follow-up visits. This is important.   Follow these instructions at home:   Diabetes medicines    Take your diabetes medicines every day as told by your health care provider. List your diabetes medicines here:      Name of medicine: ______________________________       Amount (dose): _______________ Time (a.m./p.m.): _______________ Notes: ___________________________________      Name of medicine: ______________________________       Amount (dose): _______________ Time (a.m./p.m.): _______________ Notes: ___________________________________      Name of medicine: ______________________________       Amount (dose): _______________ Time (a.m./p.m.): _______________ Notes: ___________________________________     Insulin    If you use insulin, list the types of insulin you use here:      Insulin type: ______________________________       Amount (dose): _______________ Time (a.m./p.m.): _______________Notes: ___________________________________      Insulin type: ______________________________       Amount (dose): _______________ Time (a.m./p.m.): _______________ Notes: ___________________________________      Insulin type: ______________________________       Amount (dose): _______________ Time (a.m./p.m.): _______________ Notes: ___________________________________      Insulin type: ______________________________       Amount (dose): _______________ Time (a.m./p.m.): _______________ Notes: ___________________________________      Insulin type: ______________________________       Amount (dose): _______________ Time (a.m./p.m.): _______________ Notes: ___________________________________     Managing blood glucose      Check your blood glucose levels using a glucose monitor as told by your health care provider.    Write down the times that you check your glucose levels here:       Time: _______________ Notes:  ___________________________________       Time: _______________ Notes: ___________________________________       Time: _______________ Notes: ___________________________________       Time: _______________ Notes: ___________________________________       Time: _______________ Notes: ___________________________________       Time: _______________ Notes: ___________________________________     Low blood glucose    Low blood glucose (hypoglycemia) is when glucose is at or below 70 mg/dL (3.9 mmol/L). Symptoms may include:      Feeling:       Hungry.       Sweaty and clammy.       Irritable or easily upset.       Dizzy.       Sleepy.      Having:       A fast heartbeat.       A headache.       A change in your vision.       Numbness around the mouth, lips, or tongue.      Having trouble with:       Moving (coordination).       Sleeping.     Treating low blood glucose    To treat low blood glucose, eat or drink something containing sugar right away. If you can think clearly and swallow safely, follow the 15:15 rule  :       Take 15 grams  of a fast-acting carb (carbohydrate), as told by your health care provider.      Some fast-acting carbs are:       Glucose tablets: take 3?4 tablets.       Hard candy: eat 3?5 pieces.       Fruit juice: drink 4 oz (120 mL).       Regular (not diet) soda: drink 4?6 oz (120?180 mL).       Honey or sugar: eat 1 Tbsp (15 mL).       Check your blood glucose levels 15 minutes  after you take the carb.       If your glucose is still at or below 70 mg/dL (3.9 mmol/L), take 15 grams of a carb again.       If your glucose does not go above 70 mg/dL (3.9 mmol/L) after 3 tries, get help right away.       After your glucose goes back to normal, eat a meal or a snack within 1 hour.     Treating very low blood glucose   If your glucose is at or below 54 mg/dL (3 mmol/L), you have very low blood glucose (severe hypoglycemia).   This is an emergency. Do not wait to see if the symptoms will go away. Get  medical help right away. Call your local emergency services (911 in the [place] Do not drive yourself to the hospital.     Questions to ask your health care provider         Should I talk with a diabetes educator?       What equipment will I need to care for myself at home?       What diabetes medicines do I need? When should I take them?       How often do I need to check my blood glucose levels?       What number can I call if I have questions?       When is my follow-up visit?       Where can I find a support group for people with diabetes?     Where to find more information         American Diabetes Association: www.diabetes.org         Association of Diabetes Care and Education Specialists: www.diabeteseducator.org       Contact a health care provider if:         Your blood glucose is at or above 240 mg/dL (13.3 mmol/L) for 2 days in a row.       You have been sick or have had a fever for 2 days or more, and you are not getting better.      You have any of these problems for more than 6 hours:       You cannot eat or drink.       You feel nauseous.       You vomit.       You have diarrhea.     Get help right away if:         Your blood glucose is lower than 54 mg/dL (3 mmol/L).       You get confused.       You have trouble thinking clearly.       You have trouble breathing.     These symptoms may represent a serious problem that is an emergency. Do not wait to see if the symptoms will go away. Get medical help right away. Call your local emergency services (911 in the [place] Do not drive yourself to the hospital.   Summary         Diabetes mellitus is a chronic disease that occurs when the body does not properly use sugar (glucose) that is released from food after you eat.       Take insulin and diabetes medicines as told.       Check your blood glucose every day, as often as told.       Keep all follow-up visits. This is important.     This information is not intended to replace advice given to you by your  health care provider. Make sure you discuss any questions you have with your health care provider.     Document Released: 03/22/2019Document Revised: 04/20/2021Document Reviewed: 04/20/2021     [employer] Patient Education ? 2022 [employer]         Heart-Healthy Eating Plan     Many factors influence your heart (coronary) health, including eating and exercise habits. Coronary risk increases with abnormal blood fat (lipid) levels. Heart-healthy meal planning includes limiting unhealthy fats, increasing healthy fats, and making other diet and lifestyle changes.   What is my plan?    Your health care provider may recommend that you:       Limit your fat intake to _________% or less of your total calories each day.       Limit your saturated fat intake to _________% or less of your total calories each day.       Limit the amount of cholesterol in your diet to less than _________ mg per day.     What are tips for following this plan?   Cooking    Cook foods using methods other than frying. Baking, boiling, grilling, and broiling are all good options. Other ways to reduce fat include:       Removing the skin from poultry.       Removing all visible fats from meats.       Steaming vegetables in water or broth.     Meal planning           At meals, imagine dividing your plate into fourths:       Fill one-half of your plate with vegetables and green salads.       Fill one-fourth of your plate with whole grains.       Fill one-fourth of your plate with lean protein foods.       Eat 4?5 servings of vegetables per day. One serving equals 1 cup raw or cooked vegetable, or 2 cups raw leafy greens.       Eat 4?5 servings of fruit per day. One serving equals 1 medium whole fruit, ? cup dried fruit, ? cup fresh, frozen, or canned fruit, or ? cup 100% fruit juice.       Eat more foods that contain soluble fiber. Examples include apples, broccoli, carrots, beans, peas, and barley. Aim to get 25?30 g of fiber per day.       Increase  your consumption of legumes, nuts, and seeds to 4?5 servings per week. One serving of dried beans or legumes equals ? cup cooked, 1 serving of nuts is ? cup, and 1 serving of seeds equals 1 tablespoon.     Fats         Choose healthy fats more often. Choose monounsaturated and polyunsaturated fats, such as olive and canola oils, flaxseeds, walnuts, almonds, and seeds.       Eat more omega-3 fats. Choose salmon, mackerel, sardines, tuna, flaxseed oil, and ground flaxseeds. Aim to eat fish at least 2 times each week.       Check food labels carefully to identify foods with trans fats or high amounts of saturated fat.       Limit saturated fats. These are found in animal products, such as meats, butter, and cream. Plant sources of saturated fats include palm oil, palm kernel oil, and coconut oil.       Avoid foods with partially hydrogenated oils in them. These contain trans fats. Examples are stick margarine, some tub margarines, cookies, crackers, and other baked goods.       Avoid fried foods.     General information         Eat more home-cooked food and less restaurant, buffet, and fast food.       Limit or avoid alcohol.       Limit foods that are high in starch and sugar.       Lose weight if you are overweight. Losing just 5?10% of your body weight can help your overall health and prevent diseases such as diabetes and heart disease.       Monitor your salt (sodium) intake, especially if you have high blood pressure. Talk with your health care provider about your sodium intake.       Try to incorporate more vegetarian meals weekly.       What foods can I eat?   Fruits   All fresh, canned (in natural juice), or frozen fruits.   Vegetables   Fresh or frozen vegetables (raw, steamed, roasted, or grilled). Green salads.   Grains   Most grains. Choose whole wheat and whole grains most of the time. Rice and pasta, including brown rice and pastas made with whole wheat.   Meats and other proteins   Lean, well-trimmed  beef, veal, pork, and lamb. Chicken and turkey without skin. All fish and shellfish. Wild duck, rabbit, pheasant, and venison. Egg whites or low-cholesterol egg substitutes. Dried beans, peas, lentils, and tofu. Seeds and most nuts.   Dairy   Low-fat or nonfat cheeses, including ricotta and mozzarella. Skim or 1% milk (liquid, powdered, or evaporated). Buttermilk made with low-fat milk. Nonfat or low-fat yogurt.   Fats and oils   Non-hydrogenated (trans-free) margarines. Vegetable oils, including soybean, sesame, sunflower, olive, peanut, safflower, corn, canola, and cottonseed. Salad dressings or mayonnaise made with a vegetable oil.   Beverages   Water (mineral or sparkling). Coffee and tea. Diet carbonated beverages.   Sweets and desserts   Sherbet, gelatin, and fruit ice. Small amounts of dark chocolate.   Limit all sweets and desserts.   Seasonings and condiments   All seasonings and condiments.   The items listed above may not be a complete list of foods and beverages you can eat. Contact a dietitian for more options.   What foods are not recommended?   Fruits   Canned fruit in heavy syrup. Fruit in cream or butter sauce. Fried fruit. Limit coconut.   Vegetables   Vegetables cooked in cheese, cream, or butter sauce. Fried vegetables.   Grains   Breads made with saturated or trans fats, oils, or whole milk. Croissants. Sweet rolls. Donuts. High-fat crackers, such as cheese crackers.   Meats and other proteins   Fatty meats, such as hot dogs, ribs, sausage, chaidez, rib-eye roast or steak. High-fat deli meats, such as salami and bologna. Caviar. Domestic duck and goose. Organ meats, such as liver.   Dairy   Cream, sour cream, cream cheese, and creamed cottage cheese. Whole milk cheeses. Whole or 2% milk (liquid, evaporated, or condensed). Whole buttermilk. Cream sauce or high-fat cheese sauce. Whole-milk yogurt.   Fats and oils   Meat fat, or shortening. Cocoa butter, hydrogenated oils, palm oil, coconut oil, palm  kernel oil. Solid fats and shortenings, including chaidez fat, salt pork, lard, and butter. Nondairy cream substitutes. Salad dressings with cheese or sour cream.   Beverages   Regular sodas and any drinks with added sugar.   Sweets and desserts   Frosting. Pudding. Cookies. Cakes. Pies. Milk chocolate or white chocolate. Buttered syrups. Full-fat ice cream or ice cream drinks.   The items listed above may not be a complete list of foods and beverages to avoid. Contact a dietitian for more information.   Summary         Heart-healthy meal planning includes limiting unhealthy fats, increasing healthy fats, and making other diet and lifestyle changes.       Lose weight if you are overweight. Losing just 5?10% of your body weight can help your overall health and prevent diseases such as diabetes and heart disease.       Focus on eating a balance of foods, including fruits and vegetables, low-fat or nonfat dairy, lean protein, nuts and legumes, whole grains, and heart-healthy oils and fats.     This information is not intended to replace advice given to you by your health care provider. Make sure you discuss any questions you have with your health care provider.     Document Released: 09/26/2009Document Revised: 01/25/2019Document Reviewed: 01/25/2019     [employer] Patient Education ? 2022 [employer]

## 2022-03-08 NOTE — PLAN OF CARE
Goal Outcome Evaluation:  Plan of Care Reviewed With: patient           Outcome Evaluation: PT eval completed as eval only. Patient reports mild residual numbness and tingling in LLE from previous stroke. Patient with good coordination and motor control of BLE. Patient Jeronimo for supine>sit trasnfer, Independent for sit<>stand and ambulation. 28/28 on Tinetti balance and gait assessment which indicates low fall risk. Recommend home with assist at d/c.

## 2022-03-08 NOTE — CONSULTS
"Adult Nutrition  Assessment    Patient Name:  Venkatesh Parra  YOB: 1995  MRN: [id number]  Admit Date:  3/7/2022    Assessment Date:  3/8/2022    Comments:  Pt admitted for Left sided weakness.  Neuro assessment wnl.  He presents at 250% of his IBW with a BMI of 57.99, compatible with Morbid obesity.  Pt found to have a HgbA1c of 12.9.  He reports that he was dxed with DM over a year ago.  He has recently tried to start doing better with his eating.  He does take his medication and does check his sugars.  RD discussed Diabetes, Healthy Eating, and the importance of good blood sugar control  Diet copies and contact number provided.  Pt is to follow up with Dr. Kilgore, Endocrinologist next week.  Encouraged him to ask questions and to call Rd with any questions.  He voiced understanding.  Of note--pt has been on an oral agent and he may need to be transitioned to insulin.   Will monitor POC.       Reason for Assessment     Row Name 03/08/22 1458          Reason for Assessment    Reason For Assessment per organizational policy;diagnosis/disease state     Diagnosis diabetes diagnosis/complications     Identified At Risk by Screening Criteria need for education                Nutrition/Diet History     Row Name 03/08/22 1459          Nutrition/Diet History    Typical Intake (Food/Fluid/EN/PN) Pt claims that he has started doing better with his diet.  He does check his sugars.  He has an appt with Dr. Kilgore next week.  We discussed menu suggestions.  He does like milk and drinks a lot of milk.  Portion control is an issue.  He likes meats.                Anthropometrics     Row Name 03/08/22 1508 03/08/22 1108       Anthropometrics    Height -- 170.2 cm (67.01\")    Weight -- 168 kg (370 lb 6 oz)    Height for Calculation 1.702 m (5' 7\") --    Weight for Calculation 67.1 kg (148 lb) --       Ideal Body Weight (IBW)    Retired Ideal Body Weight (IBW) (kg) -- 68.12    Retired % Ideal Body Weight -- 246.62    " "   Retired Body Mass Index (BMI)    Retired BMI (kg/m2) -- 58.12               Labs/Tests/Procedures/Meds     Row Name 03/08/22 1505          Labs/Procedures/Meds    Lab Results Reviewed reviewed, pertinent     Lab Results Comments Na 134; Gluc 346/336/449; A1c 12.90; Tchol 223; Trig 503            Diagnostic Tests/Procedures    Diagnostic Test/Procedure Reviewed reviewed, pertinent     Diagnostic Test/Procedures Comments Neuro consult and eval            Medications    Pertinent Medications Reviewed reviewed, pertinent     Pertinent Medications Comments SSI                Physical Findings     Row Name 03/08/22 1507          Physical Findings    Overall Physical Appearance Alert                Estimated/Assessed Needs - Anthropometrics     Row Name 03/08/22 1508 03/08/22 1108       Anthropometrics    Height -- 170.2 cm (67.01\")    Weight -- 168 kg (370 lb 6 oz)    Height for Calculation 1.702 m (5' 7\") --    Weight for Calculation 67.1 kg (148 lb) --       Estimated/Assessed Needs    Additional Documentation Additional Requirements (Group);Fluid Requirements (Group);Modified [place] State Equation (Group);Estimated Calorie Needs (Group);KCAL/KG (Group);[place]-St. Jeor Equation (Group);Protein Requirements (Group) --       Estimated Calorie Needs    Estimated Calorie Need Method [place]-St Jeor --       [place]-St. Jeor Equation    RMR ([place]-St. Jeor Equation) 1605.072 --    [place]-St. Jeor Activity Factors 1.4 - 1.5 --    Activity Factors ([place]-St. Jeor) 2247.1008 - 2407.608 --       Protein Requirements    Weight Used For Protein Calculations 67.1 kg (148 lb) --    Est Protein Requirement Amount (gms/kg) 1.3 gm protein --    Estimated Protein Requirements (gms/day) 87.27 --       Fluid Requirements    Fluid Requirements (mL/day) 2400 --    RDA Method (mL) 2400 --               Nutrition Prescription Ordered     Row Name 03/08/22 1510          Nutrition Prescription PO    Current PO Diet Regular     Common " Modifiers Cardiac;Consistent Carbohydrate                Evaluation of Received Nutrient/Fluid Intake     Row Name 03/08/22 1510          PO Evaluation    Number of Days PO Intake Evaluated Insufficient Data     Number of Meals 1     % PO Intake 100%                     Electronically signed by:  Sydnee Strong RD  03/08/22 15:22 CST

## 2022-03-08 NOTE — THERAPY DISCHARGE NOTE
Patient Name: Venkatesh Parra  : 1995    MRN: [id number]                              Today's Date: 3/8/2022       Admit Date: 3/7/2022    Visit Dx:     ICD-10-CM ICD-9-CM   1. Stroke-like symptom  R29.90 781.99   2. Numbness  R20.0 782.0   3. Impaired mobility and ADLs  Z74.09 V49.89    Z78.9    4. Decreased functional mobility  R26.89 781.99     Patient Active Problem List   Diagnosis   • Syncope and collapse   • Weakness   • Transient ischemic attack (TIA)   • Hypertension   • Morbid obesity (HCC)   • Diabetes mellitus (HCC)   • Left-sided back pain   • Stroke-like symptom     Past Medical History:   Diagnosis Date   • Asthma    • Carpal tunnel syndrome    • Depression    • Diabetes mellitus (HCC)    • Hypertension    • Morbid obesity (HCC)    • Osteoarthritis    • PTSD (post-traumatic stress disorder)      Past Surgical History:   Procedure Laterality Date   • CHOLECYSTECTOMY        General Information     Row Name 22          Physical Therapy Time and Intention    Document Type evaluation  -LR     Mode of Treatment physical therapy;occupational therapy;co-treatment  -LR     Row Name 22          General Information    Patient Profile Reviewed yes  -LR     Prior Level of Function independent:;all household mobility;community mobility;gait;transfer;bed mobility;ADL's;driving  -LR     Existing Precautions/Restrictions --  -LR     Row Name 22          Living Environment    People in Home alone  -LR     Row Name 22          Home Main Entrance    Number of Stairs, Main Entrance none  -LR     Row Name 22          Stairs Within Home, Primary    Stairs, Within Home, Primary apartment, threshold to get, tub with shower. Low toilet at home.  -LR     Row Name 22          Cognition    Orientation Status (Cognition) oriented x 4  -LR     Row Name 22          Safety Issues, Functional Mobility    Impairments Affecting Function (Mobility)  sensation/sensory awareness  -LR           User Key  (r) = Recorded By, (t) = Taken By, (c) = Cosigned By    Initials Name Provider Type    Arnol Burger Physical Therapist               Mobility     Row Name 03/08/22 0920          Bed Mobility    Bed Mobility supine-sit  -LR     Supine-Sit [place] (Bed Mobility) modified independence  -LR     Assistive Device (Bed Mobility) bed rails;head of bed elevated  -LR     Row Name 03/08/22 0920          Sit-Stand Transfer    Sit-Stand [place] (Transfers) independent  -LR     Row Name 03/08/22 0920          Gait/Stairs (Locomotion)    [place] Level (Gait) independent  -LR     Distance in Feet (Gait) 25'x1  -LR           User Key  (r) = Recorded By, (t) = Taken By, (c) = Cosigned By    Initials Name Provider Type    Arnol Burger Physical Therapist               Obj/Interventions     Row Name 03/08/22 0920          Strength Comprehensive (MMT)    Comment, General Manual Muscle Testing (MMT) Assessment BLE grossly 5/5  -LR     Row Name 03/08/22 0920          Sensory Assessment (Somatosensory)    Sensory Assessment (Somatosensory) sensation intact;LE sensation intact;right LE;left LE  -LR     Left LE Sensory Assessment impaired;light touch awareness  -LR     Right LE Sensory Assessment light touch awareness  -LR           User Key  (r) = Recorded By, (t) = Taken By, (c) = Cosigned By    Initials Name Provider Type    Arnol Burger Physical Therapist               Goals/Plan    No documentation.                Clinical Impression     Row Name 03/08/22 0920          Pain    Pretreatment Pain Rating 8/10  -LR     Posttreatment Pain Rating 8/10  -LR     Pain Location - Side/Orientation Right  -LR     Pain Location anterior  -LR     Pain Location - head  -LR     Pain Intervention(s) Repositioned  -LR     Row Name 03/08/22 0920          Plan of Care Review    Plan of Care Reviewed With patient  -LR     Outcome Evaluation PT eval completed as eval only.  Patient reports mild residual numbness and tingling in LLE from previous stroke. Patient with good coordination and motor control of BLE. Patient Jeronimo for supine>sit trasnfer, Independent for sit<>stand and ambulation. 28/28 on Tinetti balance and gait assessment which indicates low fall risk. Recommend home with assist at d/c.  -LR     Row Name 03/08/22 0920          Therapy Assessment/Plan (PT)    Criteria for Skilled Interventions Met (PT) no;meets criteria;skilled treatment is necessary  -LR     Row Name 03/08/22 0920          Vital Signs    Pre Systolic BP Rehab 129  -LR     Pre Treatment Diastolic BP 81  -LR     Intra Systolic BP Rehab 143  -LR     Intra Treatment Diastolic BP 66  -LR     Post Systolic BP Rehab 178  -LR     Post Treatment Diastolic BP 86  -LR     Pretreatment Heart Rate (beats/min) 92  -LR     Intratreatment Heart Rate (beats/min) 96  -LR     Posttreatment Heart Rate (beats/min) 99  -LR     Pre SpO2 (%) 96  -LR     O2 Delivery Pre Treatment room air  -LR     Intra SpO2 (%) 98  -LR     O2 Delivery Intra Treatment room air  -LR     Post SpO2 (%) 96  -LR     O2 Delivery Post Treatment room air  -LR     Pre Patient Position Supine  -LR     Intra Patient Position Sitting  -LR     Post Patient Position Supine  -LR     Row Name 03/08/22 0920          Positioning and Restraints    Pre-Treatment Position in bed  -LR     Post Treatment Position bed  -LR     In Bed notified nsg;call light within reach;encouraged to call for assist  -LR           User Key  (r) = Recorded By, (t) = Taken By, (c) = Cosigned By    Initials Name Provider Type    LR Arnol Crocker Physical Therapist               Outcome Measures     Row Name 03/08/22 0920          How much help from another person do you currently need...    Turning from your back to your side while in flat bed without using bedrails? 4  -LR     Moving from lying on back to sitting on the side of a flat bed without bedrails? 4  -LR     Moving to and from a  bed to a chair (including a wheelchair)? 4  -LR     Standing up from a chair using your arms (e.g., wheelchair, bedside chair)? 4  -LR     Climbing 3-5 steps with a railing? 4  -LR     To walk in hospital room? 4  -LR     AM-PAC 6 Clicks Score (PT) 24  -LR     Row Name 03/08/22 0925 03/08/22 0920       Modified [place]o Scale    Pre-Stroke Modified [place] Scale 1 - No significant disability despite symptoms.  Able to carry out all usual duties and activities.  -SJ 1 - No significant disability despite symptoms.  Able to carry out all usual duties and activities.  -LR    Modified [place] Scale 1 - No significant disability despite symptoms.  Able to carry out all usual duties and activities.  -SJ 1 - No significant disability despite symptoms.  Able to carry out all usual duties and activities.  -LR    Row Name 03/08/22 0925 03/08/22 0920       Functional Assessment    Outcome Measure Options AM-PAC 6 Clicks Daily Activity (OT);Modified [place]  -SJ AM-PAC 6 Clicks Basic Mobility (PT)  -LR          User Key  (r) = Recorded By, (t) = Taken By, (c) = Cosigned By    Initials Name Provider Type    LR Arnol Crocker Physical Therapist    Darya Gómez, OT Occupational Therapist              Physical Therapy Education                 Title: PT OT SLP Therapies (In Progress)     Topic: Physical Therapy (Done)     Point: Mobility training (Done)     Learning Progress Summary           Patient Acceptance, E,TB, VU by LR at 3/8/2022 1236    Comment: Educated on PT POC and goals.                   Point: Home exercise program (Done)     Learning Progress Summary           Patient Acceptance, E,TB, VU by LR at 3/8/2022 1236    Comment: Educated on PT POC and goals.                   Point: Body mechanics (Done)     Learning Progress Summary           Patient Acceptance, E,TB, VU by LR at 3/8/2022 1236    Comment: Educated on PT POC and goals.                   Point: Precautions (Done)     Learning Progress Summary            Patient Acceptance, E,TB, VU by LR at 3/8/2022 1236    Comment: Educated on PT POC and goals.                               User Key     Initials Effective Dates Name Provider Type Discipline    LR 06/16/21 -  Arnol Crocker Physical Therapist PT              PT Recommendation and Plan     Plan of Care Reviewed With: patient  Outcome Evaluation: PT eval completed as eval only. Patient reports mild residual numbness and tingling in LLE from previous stroke. Patient with good coordination and motor control of BLE. Patient Jeronimo for supine>sit trasnfer, Independent for sit<>stand and ambulation. 28/28 on Tinetti balance and gait assessment which indicates low fall risk. Recommend home with assist at d/c.     Time Calculation:    PT Charges     Row Name 03/08/22 1236             Time Calculation    Start Time 0920  -LR      Stop Time 0950  -LR      Time Calculation (min) 30 min  -LR      PT Received On 03/08/22  -LR      PT Goal Re-Cert Due Date 03/21/22  -LR              Untimed Charges    PT Eval/Re-eval Minutes 30  -LR              Total Minutes    Untimed Charges Total Minutes 30  -LR       Total Minutes 30  -LR            User Key  (r) = Recorded By, (t) = Taken By, (c) = Cosigned By    Initials Name Provider Type    LR Arnol Crocker Physical Therapist              Therapy Charges for Today     Code Description Service Date Service Provider Modifiers Qty    10037956834 HC PT EVAL LOW COMPLEXITY 2 3/8/2022 Arnol Crocker GP 1          PT G-Codes  Outcome Measure Options: AM-PAC 6 Clicks Daily Activity (OT), Modified [place]  AM-PAC 6 Clicks Score (PT): 24  AM-PAC 6 Clicks Score (OT): 24  Modified [place] Scale: 1 - No significant disability despite symptoms.  Able to carry out all usual duties and activities.    PT Discharge Summary  Anticipated Discharge Disposition (PT): home with assist    Arnol Crocker  3/8/2022

## 2022-03-08 NOTE — EXTERNAL PATIENT INSTRUCTIONS
Patient Education   Table of Contents       Heart-Healthy Eating Plan     To view videos and all your education online visit,   [email or web address]   or scan this QR code with your smartphone.                  Heart-Healthy Eating Plan     Many factors influence your heart (coronary) health, including eating and exercise habits. Coronary risk increases with abnormal blood fat (lipid) levels. Heart-healthy meal planning includes limiting unhealthy fats, increasing healthy fats, and making other diet and lifestyle changes.   What is my plan?    Your health care provider may recommend that you:       Limit your fat intake to _________% or less of your total calories each day.       Limit your saturated fat intake to _________% or less of your total calories each day.       Limit the amount of cholesterol in your diet to less than _________ mg per day.     What are tips for following this plan?   Cooking    Cook foods using methods other than frying. Baking, boiling, grilling, and broiling are all good options. Other ways to reduce fat include:       Removing the skin from poultry.       Removing all visible fats from meats.       Steaming vegetables in water or broth.     Meal planning           At meals, imagine dividing your plate into fourths:       Fill one-half of your plate with vegetables and green salads.       Fill one-fourth of your plate with whole grains.       Fill one-fourth of your plate with lean protein foods.       Eat 4?5 servings of vegetables per day. One serving equals 1 cup raw or cooked vegetable, or 2 cups raw leafy greens.       Eat 4?5 servings of fruit per day. One serving equals 1 medium whole fruit, ? cup dried fruit, ? cup fresh, frozen, or canned fruit, or ? cup 100% fruit juice.       Eat more foods that contain soluble fiber. Examples include apples, broccoli, carrots, beans, peas, and barley. Aim to get 25?30 g of fiber per day.       Increase your consumption of legumes,  nuts, and seeds to 4?5 servings per week. One serving of dried beans or legumes equals ? cup cooked, 1 serving of nuts is ? cup, and 1 serving of seeds equals 1 tablespoon.     Fats         Choose healthy fats more often. Choose monounsaturated and polyunsaturated fats, such as olive and canola oils, flaxseeds, walnuts, almonds, and seeds.       Eat more omega-3 fats. Choose salmon, mackerel, sardines, tuna, flaxseed oil, and ground flaxseeds. Aim to eat fish at least 2 times each week.       Check food labels carefully to identify foods with trans fats or high amounts of saturated fat.       Limit saturated fats. These are found in animal products, such as meats, butter, and cream. Plant sources of saturated fats include palm oil, palm kernel oil, and coconut oil.       Avoid foods with partially hydrogenated oils in them. These contain trans fats. Examples are stick margarine, some tub margarines, cookies, crackers, and other baked goods.       Avoid fried foods.     General information         Eat more home-cooked food and less restaurant, buffet, and fast food.       Limit or avoid alcohol.       Limit foods that are high in starch and sugar.       Lose weight if you are overweight. Losing just 5?10% of your body weight can help your overall health and prevent diseases such as diabetes and heart disease.       Monitor your salt (sodium) intake, especially if you have high blood pressure. Talk with your health care provider about your sodium intake.       Try to incorporate more vegetarian meals weekly.       What foods can I eat?   Fruits   All fresh, canned (in natural juice), or frozen fruits.   Vegetables   Fresh or frozen vegetables (raw, steamed, roasted, or grilled). Green salads.   Grains   Most grains. Choose whole wheat and whole grains most of the time. Rice and pasta, including brown rice and pastas made with whole wheat.   Meats and other proteins   Lean, well-trimmed beef, veal, pork, and lamb.  Chicken and turkey without skin. All fish and shellfish. Wild duck, rabbit, pheasant, and venison. Egg whites or low-cholesterol egg substitutes. Dried beans, peas, lentils, and tofu. Seeds and most nuts.   Dairy   Low-fat or nonfat cheeses, including ricotta and mozzarella. Skim or 1% milk (liquid, powdered, or evaporated). Buttermilk made with low-fat milk. Nonfat or low-fat yogurt.   Fats and oils   Non-hydrogenated (trans-free) margarines. Vegetable oils, including soybean, sesame, sunflower, olive, peanut, safflower, corn, canola, and cottonseed. Salad dressings or mayonnaise made with a vegetable oil.   Beverages   Water (mineral or sparkling). Coffee and tea. Diet carbonated beverages.   Sweets and desserts   Sherbet, gelatin, and fruit ice. Small amounts of dark chocolate.   Limit all sweets and desserts.   Seasonings and condiments   All seasonings and condiments.   The items listed above may not be a complete list of foods and beverages you can eat. Contact a dietitian for more options.   What foods are not recommended?   Fruits   Canned fruit in heavy syrup. Fruit in cream or butter sauce. Fried fruit. Limit coconut.   Vegetables   Vegetables cooked in cheese, cream, or butter sauce. Fried vegetables.   Grains   Breads made with saturated or trans fats, oils, or whole milk. Croissants. Sweet rolls. Donuts. High-fat crackers, such as cheese crackers.   Meats and other proteins   Fatty meats, such as hot dogs, ribs, sausage, chaidez, rib-eye roast or steak. High-fat deli meats, such as salami and bologna. Caviar. Domestic duck and goose. Organ meats, such as liver.   Dairy   Cream, sour cream, cream cheese, and creamed cottage cheese. Whole milk cheeses. Whole or 2% milk (liquid, evaporated, or condensed). Whole buttermilk. Cream sauce or high-fat cheese sauce. Whole-milk yogurt.   Fats and oils   Meat fat, or shortening. Cocoa butter, hydrogenated oils, palm oil, coconut oil, palm kernel oil. Solid fats and  shortenings, including chaidez fat, salt pork, lard, and butter. Nondairy cream substitutes. Salad dressings with cheese or sour cream.   Beverages   Regular sodas and any drinks with added sugar.   Sweets and desserts   Frosting. Pudding. Cookies. Cakes. Pies. Milk chocolate or white chocolate. Buttered syrups. Full-fat ice cream or ice cream drinks.   The items listed above may not be a complete list of foods and beverages to avoid. Contact a dietitian for more information.   Summary         Heart-healthy meal planning includes limiting unhealthy fats, increasing healthy fats, and making other diet and lifestyle changes.       Lose weight if you are overweight. Losing just 5?10% of your body weight can help your overall health and prevent diseases such as diabetes and heart disease.       Focus on eating a balance of foods, including fruits and vegetables, low-fat or nonfat dairy, lean protein, nuts and legumes, whole grains, and heart-healthy oils and fats.     This information is not intended to replace advice given to you by your health care provider. Make sure you discuss any questions you have with your health care provider.     Document Released: 09/26/2009Document Revised: 01/25/2019Document Reviewed: 01/25/2019     [employer] Patient Education ? 2022 [employer]

## 2022-03-08 NOTE — PROGRESS NOTES
[facility] Medicine Services  INPATIENT PROGRESS NOTE    Length of Stay: 0  Date of Admission: 3/7/2022  Primary Care Physician: Man Arambula MD    Subjective   Chief Complaint: Left-sided numbness  HPI: Feeling better today.  Blurred vision has resolved and has numbness is improved.    Review of Systems   Constitutional: Negative for appetite change, chills, fatigue and fever.   Respiratory: Negative for chest tightness and shortness of breath.    Cardiovascular: Negative for chest pain, palpitations and leg swelling.   Gastrointestinal: Negative for abdominal pain, constipation, diarrhea, nausea and vomiting.   Skin: Negative for wound.   Neurological: Positive for weakness and numbness. Negative for dizziness, light-headedness and headaches.        All pertinent negatives and positives are as above. All other systems have been reviewed and are negative unless otherwise stated.     Objective    Temp:  [97.7 °F (36.5 °C)-98.5 °F (36.9 °C)] 98 °F (36.7 °C)  Heart Rate:  [] 92  Resp:  [18-22] 18  BP: (114-157)/(56-96) 132/69    Physical Exam  Vitals reviewed.   Constitutional:       Appearance: He is well-developed. He is morbidly obese.      Comments: BMI 57.99   HENT:      Head: Normocephalic and atraumatic.   Eyes:      Pupils: Pupils are equal, round, and reactive to light.   Cardiovascular:      Rate and Rhythm: Normal rate and regular rhythm.      Heart sounds: Normal heart sounds. No murmur heard.    No friction rub. No gallop.   Pulmonary:      Effort: Pulmonary effort is normal. No respiratory distress.      Breath sounds: Normal breath sounds. No wheezing or rales.   Chest:      Chest wall: No tenderness.   Abdominal:      General: Bowel sounds are normal. There is no distension.      Palpations: Abdomen is soft.      Tenderness: There is no abdominal tenderness.   Musculoskeletal:      Cervical back: Normal range of motion and neck supple.    Psychiatric:         Behavior: Behavior normal.         Results Review:  I have reviewed the labs, radiology results, and diagnostic studies.    Laboratory Data:   Results from last 7 days   Lab Units 03/08/22  0438 03/07/22  1350   SODIUM mmol/L 134* 133*   POTASSIUM mmol/L 4.0 4.1   CHLORIDE mmol/L 98 97*   CO2 mmol/L 25.0 22.0   BUN mg/dL 13 15   CREATININE mg/dL 0.77 0.72*   GLUCOSE mg/dL 336* 346*   CALCIUM mg/dL 8.8 9.3   BILIRUBIN mg/dL 0.8 0.6   ALK PHOS U/L 87 97   ALT (SGPT) U/L 32 37   AST (SGOT) U/L 16 14   ANION GAP mmol/L 11.0 14.0     Estimated Creatinine Clearance: 218.1 mL/min (by C-G formula based on SCr of 0.77 mg/dL).          Results from last 7 days   Lab Units 03/08/22  0438 03/07/22  1350   WBC 10*3/mm3 9.61 10.84*   HEMOGLOBIN g/dL 14.0 15.8   HEMATOCRIT % 42.7 47.0   PLATELETS 10*3/mm3 275 328     Results from last 7 days   Lab Units 03/07/22  1350   INR  0.97       Culture Data:   No results found for: BLOODCX  No results found for: URINECX  No results found for: RESPCX  No results found for: WOUNDCX  No results found for: STOOLCX  No components found for: BODYFLD    Radiology Data:   Imaging Results (Last 24 Hours)     Procedure Component Value Units Date/Time    CT Head Without Contrast [[id number]] Collected: 03/08/22 1338     Updated: 03/08/22 1420    Narrative:      CT head without IV contrast March 8, 2022    INDICATION: New onset numbness    TECHNIQUE: Spiral images obtained from foramen magnum through  vertex without IV contrast. Sagittal, axial and coronal  reformatted images generated retrospectively.    This exam was performed according to our departmental  dose-optimization program, which includes automated exposure  control, adjustment of the mA and/or kV according to patient size  and/or use of iterative reconstruction technique.      FINDINGS:  Limited study technically largely due to insufficient technique.  No mass effect, midline shift, hemorrhage, hydrocephalus  or  extra-axial fluid collections.  No significant focal or acute parenchymal pathology.  Visualized mastoids and sinuses grossly clear.  No significant focal or acute bony abnormality.      Impression:      Limited study technically.  Grossly normal unenhanced CT scan of the brain.    Electronically signed by:  Marlon Manzo MD  3/8/2022 2:18 PM  CST Workstation: [id number]          I have reviewed the patient's current medications.     Assessment/Plan     Active Hospital Problems    Diagnosis    • **Stroke-like symptom    • Diabetes mellitus (HCC)    • Hypertension    • Morbid obesity (HCC)        Plan:  1.  Left-sided numbness: Much improved.  Possible TIA.  Repeat MRI okay.  Echo unremarkable.  2.  Diabetes mellitus: Suboptimal control.  Currently on sliding scale only.  On Metformin at home.  Will add Levemir.  3.  Hypertension: Good control.  4.  DVT prophylaxis: Lovenox.          The patient was evaluated during the global COVID-19 pandemic, and the diagnosis was suspected/considered upon their initial presentation.  Evaluation, treatment, and testing were consistent with current guidelines for patients who present with complaints or symptoms that may be related to COVID-19.    I confirmed that the patient's Advance Care Plan is present, code status is documented, or surrogate decision maker is listed in the patient's medical record.       Discharge Planning: I expect patient to be discharged to home in 1-2 days.        This document has been electronically signed by Umesh Glynn MD on March 8, 2022 14:48 CST

## 2022-03-08 NOTE — PROGRESS NOTES
Stroke Progress Note       Chief Complaint: Left-sided numbness and blurred vision    Subjective     HPI: Pt is a 26-yr-old right-handed white male with known diagnosis of hypertension, DM2, morbid obesity, JESUS, and asthma who presented with left-side numbness and blurred vision. This morning he states feeling better. Blurred vision has resolved and numbness has improved. Upon exam he is alert and oriented X4, strengths are equal 5/5, numbness to left arm, and visual field is intact.       Review of Systems   HENT: Negative.    Eyes: Negative.    Respiratory: Negative.    Cardiovascular: Negative.    Genitourinary: Negative.    Musculoskeletal: Negative.    Skin: Negative.    Neurological: Positive for numbness.   Hematological: Negative.    Psychiatric/Behavioral: Negative.         Objective      Temp:  [97.5 °F (36.4 °C)-98.5 °F (36.9 °C)] 98.5 °F (36.9 °C)  Heart Rate:  [] 85  Resp:  [18-22] 18  BP: (114-157)/() 123/65    Neurological Exam  Mental Status  Awake and alert. Oriented to person, place, time and situation. Oriented to person, place, and time. Speech is normal. Language is fluent with no aphasia. Attention and concentration are normal.    Cranial Nerves  CN II: Visual acuity is normal. Visual fields full to confrontation.  CN III, IV, VI: Extraocular movements intact bilaterally. Normal lids and orbits bilaterally. Pupils equal round and reactive to light bilaterally.  CN V: Facial sensation is normal.  CN VII: Full and symmetric facial movement.  CN IX, X: Palate elevates symmetrically. Normal gag reflex.  CN XI: Shoulder shrug strength is normal.  CN XII: Tongue midline without atrophy or fasciculations.    Motor  Normal muscle bulk throughout. No fasciculations present. Strength is 5/5 throughout all four extremities.    Sensory  Light touch abnormality: Left arm.     Reflexes  Not assessed.    Coordination    Finger-to-nose, rapid alternating movements and heel-to-shin normal  bilaterally without dysmetria.    Gait  Normal casual, toe, heel and tandem gait.      Physical Exam  Vitals and nursing note reviewed.   Constitutional:       General: He is awake.      Appearance: He is obese.   HENT:      Head: Normocephalic and atraumatic.   Eyes:      General: Lids are normal.      Extraocular Movements: Extraocular movements intact.      Pupils: Pupils are equal, round, and reactive to light.   Cardiovascular:      Rate and Rhythm: Normal rate.   Pulmonary:      Effort: Pulmonary effort is normal.   Musculoskeletal:         General: Normal range of motion.      Cervical back: Normal range of motion.   Skin:     General: Skin is warm and dry.   Neurological:      Mental Status: He is alert and oriented to person, place, and time.      Sensory: Sensory deficit present.      Coordination: Coordination is intact.      Deep Tendon Reflexes: Strength normal.   Psychiatric:         Mood and Affect: Mood normal.         Speech: Speech normal.         Results Review:    I reviewed the patient's new clinical results.    Lab Results (last 24 hours)     Procedure Component Value Units Date/Time    Lipid Panel [[id number]]  (Abnormal) Collected: 03/08/22 0438    Specimen: Blood Updated: 03/08/22 0522     Total Cholesterol 223 mg/dL      Triglycerides 503 mg/dL      HDL Cholesterol 28 mg/dL      LDL Cholesterol  108 mg/dL      VLDL Cholesterol 87 mg/dL      LDL/HDL Ratio 3.37    Narrative:      Cholesterol Reference Ranges  (U.S. Department of Health and Human Services ATP III Classifications)    Desirable          <200 mg/dL  Borderline High    200-239 mg/dL  High Risk          >240 mg/dL      Triglyceride Reference Ranges  (U.S. Department of Health and Human Services ATP III Classifications)    Normal           <150 mg/dL  Borderline High  150-199 mg/dL  High             200-499 mg/dL  Very High        >500 mg/dL    HDL Reference Ranges  (U.S. Department of Health and Human Services ATP III  Classifications)    Low     <40 mg/dl (major risk factor for CHD)  High    >60 mg/dl ('negative' risk factor for CHD)        LDL Reference Ranges  (U.S. Department of Health and Human Services ATP III Classifications)    Optimal          <100 mg/dL  Near Optimal     100-129 mg/dL  Borderline High  130-159 mg/dL  High             160-189 mg/dL  Very High        >189 mg/dL    Comprehensive Metabolic Panel [[id number]]  (Abnormal) Collected: 03/08/22 0438    Specimen: Blood Updated: 03/08/22 0522     Glucose 336 mg/dL      BUN 13 mg/dL      Creatinine 0.77 mg/dL      Sodium 134 mmol/L      Potassium 4.0 mmol/L      Chloride 98 mmol/L      CO2 25.0 mmol/L      Calcium 8.8 mg/dL      Total Protein 6.7 g/dL      Albumin 3.80 g/dL      ALT (SGPT) 32 U/L      AST (SGOT) 16 U/L      Alkaline Phosphatase 87 U/L      Total Bilirubin 0.8 mg/dL      Globulin 2.9 gm/dL      A/G Ratio 1.3 g/dL      BUN/Creatinine Ratio 16.9     Anion Gap 11.0 mmol/L      eGFR 125.8 mL/min/1.73      Comment: National Kidney Foundation and American Society of Nephrology (ASN) Task Force recommended calculation based on the Chronic Kidney Disease Epidemiology Collaboration (CKD-EPI) equation refit without adjustment for race.       Narrative:      GFR Normal >60  Chronic Kidney Disease <60  Kidney Failure <15      Hemoglobin A1c [[id number]]  (Abnormal) Collected: 03/08/22 0438    Specimen: Blood Updated: 03/08/22 0512     Hemoglobin A1C 12.90 %     Narrative:      Hemoglobin A1C Ranges:    Increased Risk for Diabetes  5.7% to 6.4%  Diabetes                     >= 6.5%  Diabetic Goal                < 7.0%    CBC & Differential [[id number]]  (Abnormal) Collected: 03/08/22 0438    Specimen: Blood Updated: 03/08/22 0500    Narrative:      The following orders were created for panel order CBC & Differential.  Procedure                               Abnormality         Status                     ---------                               -----------          ------                     CBC Auto Differential[[id number]]        Abnormal            Final result                 Please view results for these tests on the individual orders.    CBC Auto Differential [[id number]]  (Abnormal) Collected: 03/08/22 0438    Specimen: Blood Updated: 03/08/22 0500     WBC 9.61 10*3/mm3      RBC 5.09 10*6/mm3      Hemoglobin 14.0 g/dL      Hematocrit 42.7 %      MCV 83.9 fL      MCH 27.5 pg      MCHC 32.8 g/dL      RDW 14.3 %      RDW-SD 43.3 fl      MPV 10.4 fL      Platelets 275 10*3/mm3      Neutrophil % 58.7 %      Lymphocyte % 30.2 %      Monocyte % 5.9 %      Eosinophil % 4.4 %      Basophil % 0.4 %      Immature Grans % 0.4 %      Neutrophils, Absolute 5.64 10*3/mm3      Lymphocytes, Absolute 2.90 10*3/mm3      Monocytes, Absolute 0.57 10*3/mm3      Eosinophils, Absolute 0.42 10*3/mm3      Basophils, Absolute 0.04 10*3/mm3      Immature Grans, Absolute 0.04 10*3/mm3      nRBC 0.0 /100 WBC     POC Glucose Once [[id number]]  (Abnormal) Collected: 03/07/22 1256    Specimen: Blood Updated: 03/07/22 1929     Glucose 346 mg/dL      Comment: RN NotifiedOperator: [id number] NANCIE [facility] ID: [id number]       Extra Tubes [[id number]] Collected: 03/07/22 1357    Specimen: Blood, Venous Line Updated: 03/07/22 1802    Narrative:      The following orders were created for panel order Extra Tubes.  Procedure                               Abnormality         Status                     ---------                               -----------         ------                     Cortez Top[[id number]]                                         Final result                 Please view results for these tests on the individual orders.    Gray Top [[id number]] Collected: 03/07/22 1357    Specimen: Blood Updated: 03/07/22 1802     Extra Tube Hold for add-ons.     Comment: Auto resulted.       COVID-19 and FLU A/B PCR - Swab, Nasopharynx [[id number]]  (Normal) Collected: 03/07/22 1546    Specimen: Swab from  Nasopharynx Updated: 03/07/22 1613     COVID19 Not Detected     Influenza A PCR Not Detected     Influenza B PCR Not Detected    Narrative:      Fact sheet for providers: https://www.fda.gov/media/665496/download    Fact sheet for patients: https://www.fda.gov/media/099397/download    Test performed by PCR.    [place] Draw [[id number]] Collected: 03/07/22 1350    Specimen: Blood Updated: 03/07/22 1503    Narrative:      The following orders were created for panel order [place] Draw.  Procedure                               Abnormality         Status                     ---------                               -----------         ------                     Green Top (Gel)[[id number]]                                  Final result               Lavender Top[[id number]]                                     Final result               Gold Top - SST[[id number]]                                   Final result               Light Blue Top[[id number]]                                   Final result                 Please view results for these tests on the individual orders.    Lavender Top [[id number]] Collected: 03/07/22 1350    Specimen: Blood Updated: 03/07/22 1503     Extra Tube hold for add-on     Comment: Auto resulted       Green Top (Gel) [[id number]] Collected: 03/07/22 1350    Specimen: Blood Updated: 03/07/22 1503     Extra Tube Hold for add-ons.     Comment: Auto resulted.       Gold Top - SST [[id number]] Collected: 03/07/22 1350    Specimen: Blood Updated: 03/07/22 1503     Extra Tube Hold for add-ons.     Comment: Auto resulted.       Light Blue Top [[id number]] Collected: 03/07/22 1350    Specimen: Blood Updated: 03/07/22 1503     Extra Tube hold for add-on     Comment: Auto resulted       Comprehensive Metabolic Panel [[id number]]  (Abnormal) Collected: 03/07/22 1350    Specimen: Blood Updated: 03/07/22 1422     Glucose 346 mg/dL      BUN 15 mg/dL      Creatinine 0.72 mg/dL      Sodium 133 mmol/L      Potassium 4.1  mmol/L      Comment: Slight hemolysis detected by analyzer. Results may be affected.        Chloride 97 mmol/L      CO2 22.0 mmol/L      Calcium 9.3 mg/dL      Total Protein 7.6 g/dL      Albumin 4.10 g/dL      ALT (SGPT) 37 U/L      AST (SGOT) 14 U/L      Alkaline Phosphatase 97 U/L      Total Bilirubin 0.6 mg/dL      Globulin 3.5 gm/dL      A/G Ratio 1.2 g/dL      BUN/Creatinine Ratio 20.8     Anion Gap 14.0 mmol/L      eGFR 128.4 mL/min/1.73      Comment: National Kidney Foundation and American Society of Nephrology (ASN) Task Force recommended calculation based on the Chronic Kidney Disease Epidemiology Collaboration (CKD-EPI) equation refit without adjustment for race.       Narrative:      GFR Normal >60  Chronic Kidney Disease <60  Kidney Failure <15      Troponin [[id number]]  (Normal) Collected: 03/07/22 1350    Specimen: Blood Updated: 03/07/22 1422     Troponin T <0.010 ng/mL     Narrative:      Troponin T Reference Range:  <= 0.03 ng/mL-   Negative for AMI  >0.03 ng/mL-     Abnormal for myocardial necrosis.  Clinicians would have to utilize clinical acumen, EKG, Troponin and serial changes to determine if it is an Acute Myocardial Infarction or myocardial injury due to an underlying chronic condition.       Results may be falsely decreased if patient taking Biotin.      Protime-INR [[id number]]  (Normal) Collected: 03/07/22 1350    Specimen: Blood Updated: 03/07/22 1416     Protime 12.8 Seconds      INR 0.97    Narrative:      Therapeutic range for most indications is 2.0-3.0 INR,  or 2.5-3.5 for mechanical heart valves.    aPTT [[id number]]  (Normal) Collected: 03/07/22 1350    Specimen: Blood Updated: 03/07/22 1416     PTT 25.7 seconds     Narrative:      The recommended Heparin therapeutic range is 68-97 seconds.    CBC & Differential [[id number]]  (Abnormal) Collected: 03/07/22 1350    Specimen: Blood Updated: 03/07/22 1359    Narrative:      The following orders were created for panel order CBC &  Differential.  Procedure                               Abnormality         Status                     ---------                               -----------         ------                     CBC Auto Differential[[id number]]        Abnormal            Final result                 Please view results for these tests on the individual orders.    CBC Auto Differential [[id number]]  (Abnormal) Collected: 03/07/22 1350    Specimen: Blood Updated: 03/07/22 1359     WBC 10.84 10*3/mm3      RBC 5.71 10*6/mm3      Hemoglobin 15.8 g/dL      Hematocrit 47.0 %      MCV 82.3 fL      MCH 27.7 pg      MCHC 33.6 g/dL      RDW 14.2 %      RDW-SD 41.4 fl      MPV 10.4 fL      Platelets 328 10*3/mm3      Neutrophil % 60.2 %      Lymphocyte % 29.7 %      Monocyte % 5.4 %      Eosinophil % 3.5 %      Basophil % 0.6 %      Immature Grans % 0.6 %      Neutrophils, Absolute 6.54 10*3/mm3      Lymphocytes, Absolute 3.22 10*3/mm3      Monocytes, Absolute 0.58 10*3/mm3      Eosinophils, Absolute 0.38 10*3/mm3      Basophils, Absolute 0.06 10*3/mm3      Immature Grans, Absolute 0.06 10*3/mm3      nRBC 0.0 /100 WBC         CT Angiogram Neck    Result Date: 3/7/2022  Normal CT angiogram carotid arteries/neck. Normal CT angiogram intracranial circulation. Unremarkable CT perfusion study. No evidence of infarct. Electronically signed by:  Jonnie Jordan MD  3/7/2022 2:41 PM CST Workstation: [id number]    XR Chest 1 View    Result Date: 3/7/2022  No evidence of active disease. Electronically signed by:  Jonnie Jordan MD  3/7/2022 1:57 PM CST Workstation: [id number]    CT Head Without Contrast Stroke Protocol    Result Date: 3/7/2022  Normal CT of the head. Electronically signed by:  Parish Huynh MD  3/7/2022 1:22 PM CST Workstation: [id number]    CT Angiogram Head w AI Analysis of LVO    Result Date: 3/7/2022  Normal CT angiogram carotid arteries/neck. Normal CT angiogram intracranial circulation. Unremarkable CT perfusion study. No evidence of infarct.  Electronically signed by:  Jonnie Jordan MD  3/7/2022 2:41 PM CST Workstation: [id number]    CT CEREBRAL PERFUSION WITH & WITHOUT CONTRAST    Result Date: 3/7/2022  Normal CT angiogram carotid arteries/neck. Normal CT angiogram intracranial circulation. Unremarkable CT perfusion study. No evidence of infarct. Electronically signed by:  Jonnie Jordan MD  3/7/2022 2:41 PM CST Workstation: [id number]    Results for orders placed during the hospital encounter of 06/20/21    Adult Transthoracic Echo Complete W/ Cont if Necessary Per Protocol    Interpretation Summary  · Left ventricular ejection fraction appears to be 66 - 70%.  · Left ventricular diastolic function was normal.  · Estimated right ventricular systolic pressure from tricuspid regurgitation is normal (<35 mmHg).        Assessment/Plan     Assessment/Plan: Pt is a 26-yr-old right-handed white male with known diagnosis of hypertension, DM2, morbid obesity, JESUS, and asthma who presented with left-side numbness and blurred vision. This morning he states feeling better. Blurred vision has resolved and numbness has improved. Upon exam he is alert and oriented X4, strengths are equal 5/5, numbness to left arm, and visual field is intact.   1. Left-sided numbness- Improving, blurred vision has resolved. Possible TIA, Continue aspirin 325 mg/day and Lipitor 80 mg. Since he is unable to have MRI will get repeat CT today and if neg, no other stroke work up needed. Reviewed TIA/stroke booklet, prevention, and s/s of stroke and to call 911.  2. Hypertension- Normalize BP today. Instructed on the importance of daily BP monitoring and control to reduce stroke risk.  3. DM2- A1C is 12.9, instructed on the importance of glucose control to reduce stroke risk. Will send referral to Dr Kilgore to see him as out patient.   4. Activity- Okay to work with PT/OT today.  5. Diet- ADA heart-healthy diet.  Case was discussed with pt, Dr. Feldman, Hospitalist team, and nursing.            Patient Active Problem List   Diagnosis   • Syncope and collapse   • Weakness   • Transient ischemic attack (TIA)   • Hypertension   • Morbid obesity (HCC)   • Diabetes mellitus (HCC)   • Left-sided back pain   • Stroke-like symptom           WILFREDO Velasquez  03/08/22  09:54 CST

## 2022-03-08 NOTE — PLAN OF CARE
Goal Outcome Evaluation:  Plan of Care Reviewed With: patient        Progress: improving  Outcome Evaluation: VSS. no further c/o left sided numbness. neuro assessment WNL. up to BR to void without difficulty. will cont to monitor

## 2022-03-09 ENCOUNTER — READMISSION MANAGEMENT (OUTPATIENT)
Dept: CALL CENTER | Facility: HOSPITAL | Age: 27
End: 2022-03-09

## 2022-03-09 VITALS
RESPIRATION RATE: 18 BRPM | WEIGHT: 315 LBS | HEIGHT: 67 IN | HEART RATE: 90 BPM | TEMPERATURE: 97.7 F | OXYGEN SATURATION: 98 % | SYSTOLIC BLOOD PRESSURE: 152 MMHG | DIASTOLIC BLOOD PRESSURE: 87 MMHG | BODY MASS INDEX: 49.44 KG/M2

## 2022-03-09 LAB
GLUCOSE BLDC GLUCOMTR-MCNC: 243 MG/DL (ref 70–130)
GLUCOSE BLDC GLUCOMTR-MCNC: 244 MG/DL (ref 70–130)
GLUCOSE BLDC GLUCOMTR-MCNC: 257 MG/DL (ref 70–130)

## 2022-03-09 PROCEDURE — 99213 OFFICE O/P EST LOW 20 MIN: CPT | Performed by: NURSE PRACTITIONER

## 2022-03-09 PROCEDURE — G0378 HOSPITAL OBSERVATION PER HR: HCPCS

## 2022-03-09 PROCEDURE — 82962 GLUCOSE BLOOD TEST: CPT

## 2022-03-09 PROCEDURE — 63710000001 INSULIN ASPART PER 5 UNITS: Performed by: NURSE PRACTITIONER

## 2022-03-09 RX ORDER — LIRAGLUTIDE 6 MG/ML
0.6 INJECTION SUBCUTANEOUS DAILY
Qty: 4 PEN | Refills: 0 | OUTPATIENT
Start: 2022-03-09 | End: 2022-08-15

## 2022-03-09 RX ORDER — DULAGLUTIDE 0.75 MG/.5ML
0.75 INJECTION, SOLUTION SUBCUTANEOUS WEEKLY
Qty: 4 PEN | Refills: 0 | Status: SHIPPED | OUTPATIENT
Start: 2022-03-09 | End: 2022-03-09 | Stop reason: HOSPADM

## 2022-03-09 RX ORDER — ATORVASTATIN CALCIUM 80 MG/1
80 TABLET, FILM COATED ORAL NIGHTLY
Qty: 30 TABLET | Refills: 0 | OUTPATIENT
Start: 2022-03-09 | End: 2022-08-15

## 2022-03-09 RX ADMIN — INSULIN ASPART 5 UNITS: 100 INJECTION, SOLUTION INTRAVENOUS; SUBCUTANEOUS at 11:55

## 2022-03-09 RX ADMIN — FAMOTIDINE 20 MG: 20 TABLET ORAL at 08:28

## 2022-03-09 RX ADMIN — INSULIN ASPART 8 UNITS: 100 INJECTION, SOLUTION INTRAVENOUS; SUBCUTANEOUS at 07:00

## 2022-03-09 RX ADMIN — Medication 10 ML: at 08:28

## 2022-03-09 RX ADMIN — ASPIRIN 325 MG: 325 TABLET ORAL at 08:28

## 2022-03-09 NOTE — PLAN OF CARE
Problem: Adult Inpatient Plan of Care  Goal: Plan of Care Review  Outcome: Met   Goal Outcome Evaluation:

## 2022-03-09 NOTE — PLAN OF CARE
Goal Outcome Evaluation:      Pt ready for transition of care, possible discharge to home. Understands diet/exercise needs. Working on lifestyle changes.

## 2022-03-09 NOTE — OUTREACH NOTE
Prep Survey    Flowsheet Row Responses   [facility] patient discharged from? [facility]   Is LACE score < 7 ? Yes   Emergency Room discharge w/ pulse ox? No   Eligibility [facility]   Date of Admission 03/07/22   Date of Discharge 03/09/22   Discharge Disposition Home or Self Care   Discharge diagnosis Stroke-like symptom   Does the patient have one of the following disease processes/diagnoses(primary or secondary)? Other   Does the patient have Home health ordered? No   Is there a DME ordered? No   Prep survey completed? Yes          STEPH Ni Registered Nurse         LEFT/Baseline

## 2022-03-10 ENCOUNTER — TRANSITIONAL CARE MANAGEMENT TELEPHONE ENCOUNTER (OUTPATIENT)
Dept: CALL CENTER | Facility: HOSPITAL | Age: 27
End: 2022-03-10

## 2022-03-10 NOTE — DISCHARGE SUMMARY
[facility] Medicine Services  DISCHARGE SUMMARY       Date of Admission: 3/7/2022  Date of Discharge:  3/9/2022  Primary Care Physician: Man Arambula MD    Presenting Problem/History of Present Illness:  Numbness [R20.0]  Stroke-like symptom [R29.90]     Final Discharge Diagnoses:  Active Hospital Problems    Diagnosis    • **Stroke-like symptom    • Diabetes mellitus (HCC)    • Hypertension    • Morbid obesity (HCC)        Consults:   Consults     No orders found from 2/6/2022 to 3/8/2022.          Procedures Performed:                 Pertinent Test Results:   Imaging Results (All)     Procedure Component Value Units Date/Time    CT Head Without Contrast [[id number]] Collected: 03/08/22 1338     Updated: 03/08/22 1420    Narrative:      CT head without IV contrast March 8, 2022    INDICATION: New onset numbness    TECHNIQUE: Spiral images obtained from foramen magnum through  vertex without IV contrast. Sagittal, axial and coronal  reformatted images generated retrospectively.    This exam was performed according to our departmental  dose-optimization program, which includes automated exposure  control, adjustment of the mA and/or kV according to patient size  and/or use of iterative reconstruction technique.      FINDINGS:  Limited study technically largely due to insufficient technique.  No mass effect, midline shift, hemorrhage, hydrocephalus or  extra-axial fluid collections.  No significant focal or acute parenchymal pathology.  Visualized mastoids and sinuses grossly clear.  No significant focal or acute bony abnormality.      Impression:      Limited study technically.  Grossly normal unenhanced CT scan of the brain.    Electronically signed by:  Marlon Manzo MD  3/8/2022 2:18 PM  CST Workstation: [id number]    CT CEREBRAL PERFUSION WITH & WITHOUT CONTRAST [[id number]] Collected: 03/07/22 1306     Updated: 03/07/22 1444    Narrative:      CT angiography of the  neck , carotid arteries with contrast. CT  angiography of the intracranial circulation. CT cerebral  perfusion.    Clinical Indication: Acute stroke protocol   .    Comparison: CT brain without contrast March 7, 2022..    Technique: The study was acquired in a helical fashion with  multiplanar thin-section reconstructions following uneventful  intravenous administration of rapid bolus iodinated contrast  material. 90 cc Isovue-370. Additional three-dimensional images  were performed on the independent [employer] workstation by a  radiologist and were transferred to the PACS station for further  evaluation.  Measurement of carotid stenosis based on NASCET method for  calculating the degree of stenosis.  The NASCET method calculates  the degree of stenosis with reference to the lumen of the carotid  artery distal to the stenosis.     This exam was performed according to our departmental  dose-optimization program, which includes automated exposure  control, adjustment of the mA and/or kV according to patient size  and/or use of iterative reconstruction technique.        Findings: The common carotid arteries appear symmetric and  normal. There is no evidence of significant soft or calcific  plaque at the carotid bifurcations. There is no luminal narrowing  of the distal common carotid or internal carotid arteries. Left  vertebral artery is dominant. Both vertebral arteries are patent.  There is no evidence of stenosis at the origins of the common  carotid and vertebral arteries.     Normal CT angiography of intracranial circulation. Normal right  and left middle cerebral arteries. Normal anterior cerebral  arteries and posterior cerebral arteries.    CT PERFUSION:    Rapid:  CBF less than 30%, 0 mL. T max greater than 6.0 seconds,  0 mL.  Mass volume is 0. Mismatch ratio none.      Impression:      Normal CT angiogram carotid arteries/neck. Normal CT  angiogram intracranial circulation.    Unremarkable CT perfusion study.  No evidence of infarct.    Electronically signed by:  Jonnie Jordan MD  3/7/2022 2:41 PM CST  Workstation: [id number]    CT Angiogram Head w AI Analysis of LVO [[id number]] Collected: 03/07/22 1259     Updated: 03/07/22 1444    Narrative:      CT angiography of the neck , carotid arteries with contrast. CT  angiography of the intracranial circulation. CT cerebral  perfusion.    Clinical Indication: Acute stroke protocol   .    Comparison: CT brain without contrast March 7, 2022..    Technique: The study was acquired in a helical fashion with  multiplanar thin-section reconstructions following uneventful  intravenous administration of rapid bolus iodinated contrast  material. 90 cc Isovue-370. Additional three-dimensional images  were performed on the independent [employer] workstation by a  radiologist and were transferred to the PACS station for further  evaluation.  Measurement of carotid stenosis based on NASCET method for  calculating the degree of stenosis.  The NASCET method calculates  the degree of stenosis with reference to the lumen of the carotid  artery distal to the stenosis.     This exam was performed according to our departmental  dose-optimization program, which includes automated exposure  control, adjustment of the mA and/or kV according to patient size  and/or use of iterative reconstruction technique.        Findings: The common carotid arteries appear symmetric and  normal. There is no evidence of significant soft or calcific  plaque at the carotid bifurcations. There is no luminal narrowing  of the distal common carotid or internal carotid arteries. Left  vertebral artery is dominant. Both vertebral arteries are patent.  There is no evidence of stenosis at the origins of the common  carotid and vertebral arteries.     Normal CT angiography of intracranial circulation. Normal right  and left middle cerebral arteries. Normal anterior cerebral  arteries and posterior cerebral arteries.    CT  PERFUSION:    Rapid:  CBF less than 30%, 0 mL. T max greater than 6.0 seconds,  0 mL.  Mass volume is 0. Mismatch ratio none.      Impression:      Normal CT angiogram carotid arteries/neck. Normal CT  angiogram intracranial circulation.    Unremarkable CT perfusion study. No evidence of infarct.    Electronically signed by:  Jonnie Jordan MD  3/7/2022 2:41 PM CST  Workstation: [id number]    CT Angiogram Neck [[id number]] Collected: 03/07/22 1259     Updated: 03/07/22 1444    Narrative:      CT angiography of the neck , carotid arteries with contrast. CT  angiography of the intracranial circulation. CT cerebral  perfusion.    Clinical Indication: Acute stroke protocol   .    Comparison: CT brain without contrast March 7, 2022..    Technique: The study was acquired in a helical fashion with  multiplanar thin-section reconstructions following uneventful  intravenous administration of rapid bolus iodinated contrast  material. 90 cc Isovue-370. Additional three-dimensional images  were performed on the independent [employer] workstation by a  radiologist and were transferred to the PACS station for further  evaluation.  Measurement of carotid stenosis based on NASCET method for  calculating the degree of stenosis.  The NASCET method calculates  the degree of stenosis with reference to the lumen of the carotid  artery distal to the stenosis.     This exam was performed according to our departmental  dose-optimization program, which includes automated exposure  control, adjustment of the mA and/or kV according to patient size  and/or use of iterative reconstruction technique.        Findings: The common carotid arteries appear symmetric and  normal. There is no evidence of significant soft or calcific  plaque at the carotid bifurcations. There is no luminal narrowing  of the distal common carotid or internal carotid arteries. Left  vertebral artery is dominant. Both vertebral arteries are patent.  There is no evidence of stenosis  at the origins of the common  carotid and vertebral arteries.     Normal CT angiography of intracranial circulation. Normal right  and left middle cerebral arteries. Normal anterior cerebral  arteries and posterior cerebral arteries.    CT PERFUSION:    Rapid:  CBF less than 30%, 0 mL. T max greater than 6.0 seconds,  0 mL.  Mass volume is 0. Mismatch ratio none.      Impression:      Normal CT angiogram carotid arteries/neck. Normal CT  angiogram intracranial circulation.    Unremarkable CT perfusion study. No evidence of infarct.    Electronically signed by:  Jonnie Jordan MD  3/7/2022 2:41 PM CST  Workstation: [id number]    XR Chest 1 View [[id number]] Collected: 03/07/22 1357     Updated: 03/07/22 1424    Narrative:      Chest x-ray single view.         CLINICAL INDICATION: Shortness of breath. Acute stroke protocol.    COMPARISON: June 20, 2021.    FINDINGS: Cardiac silhouette is normal in size. Pulmonary  vascularity is unremarkable.     No focal infiltrate or consolidation.  No pleural effusion.  No  pneumothorax.      Impression:      No evidence of active disease.    Electronically signed by:  Jonnie Jordan MD  3/7/2022 1:57 PM CST  Workstation: [id number]    CT Head Without Contrast Stroke Protocol [[id number]] Collected: 03/07/22 1256     Updated: 03/07/22 1325    Narrative:        PROCEDURE: CT head without contrast    REASON FOR EXAM: Stroke, follow up    This exam was performed according to our departmental  dose-optimization program, which includes automated exposure  control, adjustment of the mA and/or kV according to patient size  and/or use of iterative reconstruction technique.    FINDINGS: Comparison study dated June 21, 2021. Axial computer  tomography sequential imaging of the head was performed from the  vertex to the base of the skull. .Sagittal and coronal  reformation was performed .    The skull vault is intact. Paranasal sinuses and bilateral  mastoid air cells are well aerated. Cerebral and  cerebellar  parenchymal are normal. Ventricular system and subarachnoid  spaces are normal.      Impression:      Normal CT of the head.    Electronically signed by:  Parish Huynh MD  3/7/2022 1:22 PM CST  Workstation: [id number]         Lab Results (last 48 hours)     Procedure Component Value Units Date/Time    POC Glucose Once [[id number]]  (Abnormal) Collected: 03/09/22 1035    Specimen: Blood Updated: 03/09/22 1105     Glucose 244 mg/dL      Comment: RN NotifiedOperator: [id number] CARLOS ROBLESIKAMeter ID: [id number]       POC Glucose Once [[id number]]  (Abnormal) Collected: 03/09/22 0554    Specimen: Blood Updated: 03/09/22 0627     Glucose 257 mg/dL      Comment: : [id number] JUMANA MARSHALLMeter ID: [id number]       POC Glucose Once [[id number]]  (Abnormal) Collected: 03/08/22 2354    Specimen: Blood Updated: 03/09/22 0007     Glucose 243 mg/dL      Comment: : [id number] JUMANA MARSHALLMeter ID: [id number]       POC Glucose Once [[id number]]  (Abnormal) Collected: 03/08/22 2059    Specimen: Blood Updated: 03/08/22 2110     Glucose 287 mg/dL      Comment: : [id number] JUMANA MARSHALLMeter ID: [id number]       POC Glucose Once [[id number]]  (Abnormal) Collected: 03/08/22 1636    Specimen: Blood Updated: 03/08/22 1846     Glucose 228 mg/dL      Comment: : [id number] ALEX ELIAKHONMeter ID: [id number]       POC Glucose Once [[id number]]  (Abnormal) Collected: 03/08/22 1231    Specimen: Blood Updated: 03/08/22 1403     Glucose 449 mg/dL      Comment: : [id number] ELISABETHYECENIA SOKHONMeter ID: [id number]       Lipid Panel [[id number]]  (Abnormal) Collected: 03/08/22 0438    Specimen: Blood Updated: 03/08/22 0522     Total Cholesterol 223 mg/dL      Triglycerides 503 mg/dL      HDL Cholesterol 28 mg/dL      LDL Cholesterol  108 mg/dL      VLDL Cholesterol 87 mg/dL      LDL/HDL Ratio 3.37    Narrative:      Cholesterol Reference Ranges  (U.S. Department of Health and Human  Services ATP III Classifications)    Desirable          <200 mg/dL  Borderline High    200-239 mg/dL  High Risk          >240 mg/dL      Triglyceride Reference Ranges  (U.S. Department of Health and Human Services ATP III Classifications)    Normal           <150 mg/dL  Borderline High  150-199 mg/dL  High             200-499 mg/dL  Very High        >500 mg/dL    HDL Reference Ranges  (U.S. Department of Health and Human Services ATP III Classifications)    Low     <40 mg/dl (major risk factor for CHD)  High    >60 mg/dl ('negative' risk factor for CHD)        LDL Reference Ranges  (U.S. Department of Health and Human Services ATP III Classifications)    Optimal          <100 mg/dL  Near Optimal     100-129 mg/dL  Borderline High  130-159 mg/dL  High             160-189 mg/dL  Very High        >189 mg/dL    Comprehensive Metabolic Panel [[id number]]  (Abnormal) Collected: 03/08/22 0438    Specimen: Blood Updated: 03/08/22 0522     Glucose 336 mg/dL      BUN 13 mg/dL      Creatinine 0.77 mg/dL      Sodium 134 mmol/L      Potassium 4.0 mmol/L      Chloride 98 mmol/L      CO2 25.0 mmol/L      Calcium 8.8 mg/dL      Total Protein 6.7 g/dL      Albumin 3.80 g/dL      ALT (SGPT) 32 U/L      AST (SGOT) 16 U/L      Alkaline Phosphatase 87 U/L      Total Bilirubin 0.8 mg/dL      Globulin 2.9 gm/dL      A/G Ratio 1.3 g/dL      BUN/Creatinine Ratio 16.9     Anion Gap 11.0 mmol/L      eGFR 125.8 mL/min/1.73      Comment: National Kidney Foundation and American Society of Nephrology (ASN) Task Force recommended calculation based on the Chronic Kidney Disease Epidemiology Collaboration (CKD-EPI) equation refit without adjustment for race.       Narrative:      GFR Normal >60  Chronic Kidney Disease <60  Kidney Failure <15      Hemoglobin A1c [[id number]]  (Abnormal) Collected: 03/08/22 0438    Specimen: Blood Updated: 03/08/22 0512     Hemoglobin A1C 12.90 %     Narrative:      Hemoglobin A1C Ranges:    Increased Risk for Diabetes   5.7% to 6.4%  Diabetes                     >= 6.5%  Diabetic Goal                < 7.0%    CBC & Differential [[id number]]  (Abnormal) Collected: 03/08/22 0438    Specimen: Blood Updated: 03/08/22 0500    Narrative:      The following orders were created for panel order CBC & Differential.  Procedure                               Abnormality         Status                     ---------                               -----------         ------                     CBC Auto Differential[[id number]]        Abnormal            Final result                 Please view results for these tests on the individual orders.    CBC Auto Differential [[id number]]  (Abnormal) Collected: 03/08/22 0438    Specimen: Blood Updated: 03/08/22 0500     WBC 9.61 10*3/mm3      RBC 5.09 10*6/mm3      Hemoglobin 14.0 g/dL      Hematocrit 42.7 %      MCV 83.9 fL      MCH 27.5 pg      MCHC 32.8 g/dL      RDW 14.3 %      RDW-SD 43.3 fl      MPV 10.4 fL      Platelets 275 10*3/mm3      Neutrophil % 58.7 %      Lymphocyte % 30.2 %      Monocyte % 5.9 %      Eosinophil % 4.4 %      Basophil % 0.4 %      Immature Grans % 0.4 %      Neutrophils, Absolute 5.64 10*3/mm3      Lymphocytes, Absolute 2.90 10*3/mm3      Monocytes, Absolute 0.57 10*3/mm3      Eosinophils, Absolute 0.42 10*3/mm3      Basophils, Absolute 0.04 10*3/mm3      Immature Grans, Absolute 0.04 10*3/mm3      nRBC 0.0 /100 WBC     POC Glucose Once [[id number]]  (Abnormal) Collected: 03/07/22 1256    Specimen: Blood Updated: 03/07/22 1929     Glucose 346 mg/dL      Comment: RN NotifiedOperator: [id number] [place] [facility] ID: [id number]       Extra Tubes [[id number]] Collected: 03/07/22 1357    Specimen: Blood, Venous Line Updated: 03/07/22 3581    Narrative:      The following orders were created for panel order Extra Tubes.  Procedure                               Abnormality         Status                     ---------                               -----------         ------                      Cortez Top[[id number]]                                         Final result                 Please view results for these tests on the individual orders.    Gray Top [[id number]] Collected: 03/07/22 1357    Specimen: Blood Updated: 03/07/22 1802     Extra Tube Hold for add-ons.     Comment: Auto resulted.       COVID-19 and FLU A/B PCR - Swab, Nasopharynx [[id number]]  (Normal) Collected: 03/07/22 1546    Specimen: Swab from Nasopharynx Updated: 03/07/22 1613     COVID19 Not Detected     Influenza A PCR Not Detected     Influenza B PCR Not Detected    Narrative:      Fact sheet for providers: https://www.fda.gov/media/170538/download    Fact sheet for patients: https://www.fda.gov/media/320374/download    Test performed by PCR.    [place] Draw [[id number]] Collected: 03/07/22 1350    Specimen: Blood Updated: 03/07/22 1503    Narrative:      The following orders were created for panel order [place] Draw.  Procedure                               Abnormality         Status                     ---------                               -----------         ------                     Green Top (Gel)[[id number]]                                  Final result               Lavender Top[[id number]]                                     Final result               Gold Top - SST[[id number]]                                   Final result               Light Blue Top[[id number]]                                   Final result                 Please view results for these tests on the individual orders.    Lavender Top [[id number]] Collected: 03/07/22 1350    Specimen: Blood Updated: 03/07/22 1503     Extra Tube hold for add-on     Comment: Auto resulted       Green Top (Gel) [[id number]] Collected: 03/07/22 1350    Specimen: Blood Updated: 03/07/22 1503     Extra Tube Hold for add-ons.     Comment: Auto resulted.       Gold Top - SST [[id number]] Collected: 03/07/22 1350    Specimen: Blood Updated: 03/07/22 1503     Extra Tube Hold for  add-ons.     Comment: Auto resulted.       Light Blue Top [[id number]] Collected: 03/07/22 1350    Specimen: Blood Updated: 03/07/22 1503     Extra Tube hold for add-on     Comment: Auto resulted       Comprehensive Metabolic Panel [[id number]]  (Abnormal) Collected: 03/07/22 1350    Specimen: Blood Updated: 03/07/22 1422     Glucose 346 mg/dL      BUN 15 mg/dL      Creatinine 0.72 mg/dL      Sodium 133 mmol/L      Potassium 4.1 mmol/L      Comment: Slight hemolysis detected by analyzer. Results may be affected.        Chloride 97 mmol/L      CO2 22.0 mmol/L      Calcium 9.3 mg/dL      Total Protein 7.6 g/dL      Albumin 4.10 g/dL      ALT (SGPT) 37 U/L      AST (SGOT) 14 U/L      Alkaline Phosphatase 97 U/L      Total Bilirubin 0.6 mg/dL      Globulin 3.5 gm/dL      A/G Ratio 1.2 g/dL      BUN/Creatinine Ratio 20.8     Anion Gap 14.0 mmol/L      eGFR 128.4 mL/min/1.73      Comment: National Kidney Foundation and American Society of Nephrology (ASN) Task Force recommended calculation based on the Chronic Kidney Disease Epidemiology Collaboration (CKD-EPI) equation refit without adjustment for race.       Narrative:      GFR Normal >60  Chronic Kidney Disease <60  Kidney Failure <15      Troponin [[id number]]  (Normal) Collected: 03/07/22 1350    Specimen: Blood Updated: 03/07/22 1422     Troponin T <0.010 ng/mL     Narrative:      Troponin T Reference Range:  <= 0.03 ng/mL-   Negative for AMI  >0.03 ng/mL-     Abnormal for myocardial necrosis.  Clinicians would have to utilize clinical acumen, EKG, Troponin and serial changes to determine if it is an Acute Myocardial Infarction or myocardial injury due to an underlying chronic condition.       Results may be falsely decreased if patient taking Biotin.      Protime-INR [[id number]]  (Normal) Collected: 03/07/22 1350    Specimen: Blood Updated: 03/07/22 1416     Protime 12.8 Seconds      INR 0.97    Narrative:      Therapeutic range for most indications is 2.0-3.0  INR,  or 2.5-3.5 for mechanical heart valves.    aPTT [[id number]]  (Normal) Collected: 03/07/22 1350    Specimen: Blood Updated: 03/07/22 1416     PTT 25.7 seconds     Narrative:      The recommended Heparin therapeutic range is 68-97 seconds.    CBC & Differential [[id number]]  (Abnormal) Collected: 03/07/22 1350    Specimen: Blood Updated: 03/07/22 1359    Narrative:      The following orders were created for panel order CBC & Differential.  Procedure                               Abnormality         Status                     ---------                               -----------         ------                     CBC Auto Differential[[id number]]        Abnormal            Final result                 Please view results for these tests on the individual orders.    CBC Auto Differential [[id number]]  (Abnormal) Collected: 03/07/22 1350    Specimen: Blood Updated: 03/07/22 1359     WBC 10.84 10*3/mm3      RBC 5.71 10*6/mm3      Hemoglobin 15.8 g/dL      Hematocrit 47.0 %      MCV 82.3 fL      MCH 27.7 pg      MCHC 33.6 g/dL      RDW 14.2 %      RDW-SD 41.4 fl      MPV 10.4 fL      Platelets 328 10*3/mm3      Neutrophil % 60.2 %      Lymphocyte % 29.7 %      Monocyte % 5.4 %      Eosinophil % 3.5 %      Basophil % 0.6 %      Immature Grans % 0.6 %      Neutrophils, Absolute 6.54 10*3/mm3      Lymphocytes, Absolute 3.22 10*3/mm3      Monocytes, Absolute 0.58 10*3/mm3      Eosinophils, Absolute 0.38 10*3/mm3      Basophils, Absolute 0.06 10*3/mm3      Immature Grans, Absolute 0.06 10*3/mm3      nRBC 0.0 /100 WBC             Chief Complaint on Day of Discharge: TIA     Hospital Course:  The patient is a 26 y.o. male who presented to [facility] with left sided numbness. Neurology was consulted. MRI was done. It was likely a TIA. Echo unremarkable. He has diabetes mellitus that is poorly controlled. Will add GLP 1 at discharge and he has a follow up appointment with endocrinology. Continue Metformin.  "Neurology recommended continued  and lipitor at discharge. He was at baseline and can follow up as an outpatient.     Condition on Discharge:  Stable     Physical Exam on Discharge:  /87   Pulse 90   Temp 97.7 °F (36.5 °C) (Oral)   Resp 18   Ht 170.2 cm (67.01\")   Wt (!) 162 kg (356 lb 11.3 oz)   SpO2 98%   BMI 55.85 kg/m²   Physical Exam  Vitals and nursing note reviewed.   Constitutional:       General: He is awake.      Appearance: Normal appearance.   HENT:      Head: Normocephalic and atraumatic.   Eyes:      General: Lids are normal.      Extraocular Movements: Extraocular movements intact.      Pupils: Pupils are equal, round, and reactive to light.   Cardiovascular:      Rate and Rhythm: Normal rate.   Pulmonary:      Effort: Pulmonary effort is normal.   Musculoskeletal:         General: Normal range of motion.      Cervical back: Normal range of motion.   Skin:     General: Skin is warm and dry.   Neurological:      Mental Status: He is alert and oriented to person, place, and time. Mental status is at baseline.      Coordination: Coordination is intact.   Psychiatric:         Mood and Affect: Mood normal.         Speech: Speech normal.           Discharge Disposition:  Home or Self Care    Discharge Medications:     Discharge Medications      New Medications      Instructions Start Date   Victoza 18 MG/3ML solution pen-injector injection  Generic drug: Liraglutide   0.6 mg, Subcutaneous, Daily         Changes to Medications      Instructions Start Date   atorvastatin 80 MG tablet  Commonly known as: LIPITOR  What changed:   · medication strength  · how much to take  · when to take this   80 mg, Oral, Nightly         Continue These Medications      Instructions Start Date   albuterol sulfate  (90 Base) MCG/ACT inhaler  Commonly known as: Ventolin HFA   2 puffs, Inhalation, Every 4 Hours PRN      aspirin  MG tablet   325 mg, Oral, Daily      D-Care Glucometer w/Device kit   " 1 kit, Does not apply, 3 Times Daily Before Meals      famotidine 20 MG tablet  Commonly known as: PEPCID   20 mg, Oral, Daily      losartan 50 MG tablet  Commonly known as: COZAAR   50 mg, Oral, Every 24 Hours Scheduled      metFORMIN  MG 24 hr tablet  Commonly known as: GLUCOPHAGE-XR   1,000 mg, Oral, 2 Times Daily Before Meals      montelukast 10 MG tablet  Commonly known as: SINGULAIR   10 mg, Oral, Nightly         Stop These Medications    sulfamethoxazole-trimethoprim 800-160 MG per tablet  Commonly known as: BACTRIM DS,SEPTRA DS            Discharge Diet:   Diet Instructions     Diet: Consistent Carbohydrate      Discharge Diet: Consistent Carbohydrate          Activity at Discharge:   Activity Instructions     Activity as Tolerated     Additional Activity Instructions:    As tolerated           Follow-up Appointments:   Future Appointments   Date Time Provider Department [place]   4/21/2022  8:00 AM Cruz Christianson APRN [facility]   5/27/2022 11:00 AM Man Arambula MD [facility] None       Test Results Pending at Discharge:     Time: >30 minutes        This document has been electronically signed by Natalie Goldman MD on March 9, 2022 18:22 CST

## 2022-03-10 NOTE — OUTREACH NOTE
Call Center TCM Note    Flowsheet Row Responses   [facility] patient discharged from? [place]   Does the patient have one of the following disease processes/diagnoses(primary or secondary)? Other   TCM attempt successful? Yes   Discharge diagnosis Stroke-like symptom   Meds reviewed with patient/caregiver? Yes   Is the patient having any side effects they believe may be caused by any medication additions or changes? No   Does the patient have all medications ordered at discharge? Yes   Is the patient taking all medications as directed (includes completed medication regime)? Yes   Does the patient have a primary care provider?  Yes   Does the patient have an appointment with their PCP within 7 days of discharge? No   Comments regarding PCP Pt states he needs date 10-14 days out to give enough notice at work. I am asking [facility] to review sched for TCM [facility] 03/21-23/2022 to stay in TCM time frame and allow pt to be off work for appt.    Has the patient kept scheduled appointments due by today? N/A   Has home health visited the patient within 72 hours of discharge? N/A   Psychosocial issues? No   Did the patient receive a copy of their discharge instructions? Yes   Nursing interventions Reviewed instructions with patient   What is the patient's perception of their health status since discharge? Improving   Is the patient/caregiver able to teach back signs and symptoms related to disease process for when to call PCP? Yes   Is the patient/caregiver able to teach back signs and symptoms related to disease process for when to call 911? Yes   Is the patient/caregiver able to teach back the hierarchy of who to call/visit for symptoms/problems? PCP, Specialist, Home health nurse, Urgent Care, ED, 911 Yes   TCM call completed? Yes   Wrap up additional comments Pt doing ok, back at work today. All meds to be p/u today. No questions at this time. Pt states he needs date 10-14 days out to give enough notice at work. I am  asking [facility] to review sched for TCM [facility] 03/21-23/2022 to stay in TCM time frame and allow pt to be off work for appt.           Patty Haley MA    3/10/2022, 12:57 CST

## 2022-03-14 LAB
QT INTERVAL: 340 MS
QTC INTERVAL: 462 MS

## 2022-04-10 DIAGNOSIS — L73.9 FOLLICULITIS: Primary | ICD-10-CM

## 2022-04-10 RX ORDER — SULFAMETHOXAZOLE AND TRIMETHOPRIM 800; 160 MG/1; MG/1
1 TABLET ORAL 2 TIMES DAILY
Qty: 20 TABLET | Refills: 2 | Status: SHIPPED | OUTPATIENT
Start: 2022-04-10 | End: 2022-08-02

## 2022-07-21 NOTE — PROGRESS NOTES
Nutrition Services    Patient Name:  Venkatesh Parra  YOB: 1995  MRN: [id number]  Admit Date:  3/7/2022    Pt is being discharged today.  Follow-up to see if pt had any questions regarding the information that we discussed yesterday.  He did not.  Demonstrated understanding.  Once again encouraged him to call RD with any questions and to follow up with Dr. Kilgore.      Electronically signed by:  Sydnee Strong RD  03/09/22 09:21 CST    61

## 2022-08-02 PROCEDURE — 87635 SARS-COV-2 COVID-19 AMP PRB: CPT | Performed by: FAMILY MEDICINE

## 2022-09-28 ENCOUNTER — DOCUMENTATION (OUTPATIENT)
Dept: NUTRITION | Facility: HOSPITAL | Age: 27
End: 2022-09-28

## 2022-11-22 ENCOUNTER — HOSPITAL ENCOUNTER (EMERGENCY)
Facility: HOSPITAL | Age: 27
Discharge: HOME | End: 2022-11-22
Attending: STUDENT IN AN ORGANIZED HEALTH CARE EDUCATION/TRAINING PROGRAM | Admitting: STUDENT IN AN ORGANIZED HEALTH CARE EDUCATION/TRAINING PROGRAM

## 2022-11-22 VITALS
HEART RATE: 102 BPM | TEMPERATURE: 98.3 F | SYSTOLIC BLOOD PRESSURE: 118 MMHG | WEIGHT: 315 LBS | HEIGHT: 66 IN | DIASTOLIC BLOOD PRESSURE: 59 MMHG | BODY MASS INDEX: 50.62 KG/M2 | RESPIRATION RATE: 20 BRPM | OXYGEN SATURATION: 95 %

## 2022-11-22 DIAGNOSIS — R73.9 HYPERGLYCEMIA: Primary | ICD-10-CM

## 2022-11-22 LAB
ACETONE BLD QL: NEGATIVE
ALBUMIN SERPL-MCNC: 4 G/DL (ref 3.5–5.2)
ALBUMIN/GLOB SERPL: 1.3 G/DL
ALP SERPL-CCNC: 91 U/L (ref 39–117)
ALT SERPL W P-5'-P-CCNC: 40 U/L (ref 1–41)
ANION GAP SERPL CALCULATED.3IONS-SCNC: 17 MMOL/L (ref 5–15)
AST SERPL-CCNC: 19 U/L (ref 1–40)
BASOPHILS # BLD AUTO: 0.07 10*3/MM3 (ref 0–0.2)
BASOPHILS NFR BLD AUTO: 0.6 % (ref 0–1.5)
BILIRUB SERPL-MCNC: 0.7 MG/DL (ref 0–1.2)
BILIRUB UR QL STRIP: NEGATIVE
BUN SERPL-MCNC: 19 MG/DL (ref 6–20)
BUN/CREAT SERPL: 21.8 (ref 7–25)
CALCIUM SPEC-SCNC: 9.3 MG/DL (ref 8.6–10.5)
CHLORIDE SERPL-SCNC: 91 MMOL/L (ref 98–107)
CLARITY UR: ABNORMAL
CO2 SERPL-SCNC: 21 MMOL/L (ref 22–29)
COLOR UR: YELLOW
CREAT SERPL-MCNC: 0.87 MG/DL (ref 0.76–1.27)
DEPRECATED RDW RBC AUTO: 38.8 FL (ref 37–54)
EGFRCR SERPLBLD CKD-EPI 2021: 120.5 ML/MIN/1.73
EOSINOPHIL # BLD AUTO: 0.43 10*3/MM3 (ref 0–0.4)
EOSINOPHIL NFR BLD AUTO: 3.5 % (ref 0.3–6.2)
ERYTHROCYTE [DISTWIDTH] IN BLOOD BY AUTOMATED COUNT: 13.7 % (ref 12.3–15.4)
FLUAV RNA RESP QL NAA+PROBE: NOT DETECTED
FLUBV RNA RESP QL NAA+PROBE: NOT DETECTED
GLOBULIN UR ELPH-MCNC: 3.1 GM/DL
GLUCOSE BLDC GLUCOMTR-MCNC: 367 MG/DL (ref 70–130)
GLUCOSE SERPL-MCNC: 570 MG/DL (ref 65–99)
GLUCOSE UR STRIP-MCNC: ABNORMAL MG/DL
HCT VFR BLD AUTO: 43.3 % (ref 37.5–51)
HGB BLD-MCNC: 14.9 G/DL (ref 13–17.7)
HGB UR QL STRIP.AUTO: NEGATIVE
HOLD SPECIMEN: NORMAL
HOLD SPECIMEN: NORMAL
IMM GRANULOCYTES # BLD AUTO: 0.04 10*3/MM3 (ref 0–0.05)
IMM GRANULOCYTES NFR BLD AUTO: 0.3 % (ref 0–0.5)
KETONES UR QL STRIP: NEGATIVE
LEUKOCYTE ESTERASE UR QL STRIP.AUTO: NEGATIVE
LYMPHOCYTES # BLD AUTO: 3.26 10*3/MM3 (ref 0.7–3.1)
LYMPHOCYTES NFR BLD AUTO: 26.5 % (ref 19.6–45.3)
MCH RBC QN AUTO: 27.5 PG (ref 26.6–33)
MCHC RBC AUTO-ENTMCNC: 34.4 G/DL (ref 31.5–35.7)
MCV RBC AUTO: 80 FL (ref 79–97)
MONOCYTES # BLD AUTO: 0.85 10*3/MM3 (ref 0.1–0.9)
MONOCYTES NFR BLD AUTO: 6.9 % (ref 5–12)
NEUTROPHILS NFR BLD AUTO: 62.2 % (ref 42.7–76)
NEUTROPHILS NFR BLD AUTO: 7.65 10*3/MM3 (ref 1.7–7)
NITRITE UR QL STRIP: NEGATIVE
NRBC BLD AUTO-RTO: 0 /100 WBC (ref 0–0.2)
PH UR STRIP.AUTO: <=5 [PH] (ref 5–9)
PLATELET # BLD AUTO: 291 10*3/MM3 (ref 140–450)
PMV BLD AUTO: 10.2 FL (ref 6–12)
POTASSIUM SERPL-SCNC: 3.7 MMOL/L (ref 3.5–5.2)
PROT SERPL-MCNC: 7.1 G/DL (ref 6–8.5)
PROT UR QL STRIP: ABNORMAL
RBC # BLD AUTO: 5.41 10*6/MM3 (ref 4.14–5.8)
SARS-COV-2 RNA RESP QL NAA+PROBE: NOT DETECTED
SODIUM SERPL-SCNC: 129 MMOL/L (ref 136–145)
SP GR UR STRIP: 1.04 (ref 1–1.03)
UROBILINOGEN UR QL STRIP: ABNORMAL
WBC NRBC COR # BLD: 12.3 10*3/MM3 (ref 3.4–10.8)
WHOLE BLOOD HOLD COAG: NORMAL
WHOLE BLOOD HOLD SPECIMEN: NORMAL

## 2022-11-22 PROCEDURE — 80053 COMPREHEN METABOLIC PANEL: CPT | Performed by: STUDENT IN AN ORGANIZED HEALTH CARE EDUCATION/TRAINING PROGRAM

## 2022-11-22 PROCEDURE — 82962 GLUCOSE BLOOD TEST: CPT

## 2022-11-22 PROCEDURE — 87636 SARSCOV2 & INF A&B AMP PRB: CPT | Performed by: STUDENT IN AN ORGANIZED HEALTH CARE EDUCATION/TRAINING PROGRAM

## 2022-11-22 PROCEDURE — 82009 KETONE BODYS QUAL: CPT | Performed by: STUDENT IN AN ORGANIZED HEALTH CARE EDUCATION/TRAINING PROGRAM

## 2022-11-22 PROCEDURE — 81003 URINALYSIS AUTO W/O SCOPE: CPT | Performed by: STUDENT IN AN ORGANIZED HEALTH CARE EDUCATION/TRAINING PROGRAM

## 2022-11-22 PROCEDURE — 63710000001 INSULIN REGULAR HUMAN PER 5 UNITS: Performed by: STUDENT IN AN ORGANIZED HEALTH CARE EDUCATION/TRAINING PROGRAM

## 2022-11-22 PROCEDURE — 85025 COMPLETE CBC W/AUTO DIFF WBC: CPT

## 2022-11-22 PROCEDURE — 99284 EMERGENCY DEPT VISIT MOD MDM: CPT

## 2022-11-22 PROCEDURE — C9803 HOPD COVID-19 SPEC COLLECT: HCPCS

## 2022-11-22 RX ORDER — SODIUM CHLORIDE 0.9 % (FLUSH) 0.9 %
10 SYRINGE (ML) INJECTION AS NEEDED
Status: DISCONTINUED | OUTPATIENT
Start: 2022-11-22 | End: 2022-11-23 | Stop reason: HOSPADM

## 2022-11-22 RX ADMIN — HUMAN INSULIN 5 UNITS: 100 INJECTION, SOLUTION SUBCUTANEOUS at 20:05

## 2022-11-22 RX ADMIN — SODIUM CHLORIDE, POTASSIUM CHLORIDE, SODIUM LACTATE AND CALCIUM CHLORIDE 1000 ML: 600; 310; 30; 20 INJECTION, SOLUTION INTRAVENOUS at 20:05

## 2022-11-23 LAB
GLUCOSE BLDC GLUCOMTR-MCNC: 405 MG/DL (ref 70–130)
GLUCOSE BLDC GLUCOMTR-MCNC: 560 MG/DL (ref 70–130)

## 2022-11-23 NOTE — ED PROVIDER NOTES
Subjective   History of Present Illness  27-year-old male comes to the ER chief complaint of having a high blood sugar of 160 at home.  He also endorses having some blurry vision at home.  Patient does wear glasses.  He has not taken his diabetic pills for last 2 weeks because he ran out of them.  Patient states he is very tired and does not have a lot of energy.  He also endorses having a stuffy runny nose with body aches.  Nothing seems to make them better or worse.  He denies other symptoms.    History provided by:  Patient   used: No        Review of Systems   Constitutional: Positive for activity change and fatigue. Negative for appetite change, chills and fever.   HENT: Negative for drooling.    Eyes: Positive for visual disturbance. Negative for redness.   Respiratory: Negative for cough, chest tightness, shortness of breath and wheezing.    Cardiovascular: Negative for chest pain.   Gastrointestinal: Negative for abdominal pain, nausea and vomiting.   Genitourinary: Negative for flank pain.   Musculoskeletal: Positive for arthralgias and myalgias.   Skin: Negative for color change.   Neurological: Negative for dizziness, seizures and light-headedness.   Psychiatric/Behavioral: Negative for confusion.       Past Medical History:   Diagnosis Date   • Asthma    • Carpal tunnel syndrome    • Depression    • Diabetes mellitus (HCC)    • Hypertension    • Morbid obesity (HCC)    • Osteoarthritis    • PTSD (post-traumatic stress disorder)        Allergies   Allergen Reactions   • Lorcet [Hydrocodone-Acetaminophen] Mental Status Change   • Amoxicillin Rash   • Penicillins Rash     redness       Past Surgical History:   Procedure Laterality Date   • CHOLECYSTECTOMY         Family History   Problem Relation Age of Onset   • Hypertension Father        Social History     Socioeconomic History   • Marital status: Single   Tobacco Use   • Smoking status: Former     Packs/day: 1.00     Types: Cigarettes    • Smokeless tobacco: Current     Types: Chew   Vaping Use   • Vaping Use: Never used   Substance and Sexual Activity   • Alcohol use: Not Currently   • Drug use: Never   • Sexual activity: Not Currently           Objective    Vitals:    11/22/22 2010 11/22/22 2011 11/22/22 2100 11/22/22 2147   BP: 128/82 124/59 110/56 118/58   BP Location:    Left arm   Patient Position: Sitting Sitting  Lying   Pulse: 113 (!) 125 104 101   Resp:   20 20   Temp:       SpO2:   92% 90%   Weight:       Height:           Physical Exam  Vitals and nursing note reviewed.   Constitutional:       General: He is sleeping. He is not in acute distress.     Appearance: He is well-developed. He is obese. He is not ill-appearing, toxic-appearing or diaphoretic.   HENT:      Head: Normocephalic.      Right Ear: External ear normal.      Left Ear: External ear normal.      Nose: Congestion present.   Eyes:      Conjunctiva/sclera: Conjunctivae normal.   Pulmonary:      Effort: Pulmonary effort is normal. No accessory muscle usage or respiratory distress.   Chest:      Chest wall: No tenderness.   Abdominal:      Palpations: Abdomen is soft.      Tenderness: There is no abdominal tenderness (deep palpation).   Skin:     General: Skin is warm and dry.      Capillary Refill: Capillary refill takes less than 2 seconds.   Neurological:      Mental Status: He is oriented to person, place, and time and easily aroused.   Psychiatric:         Behavior: Behavior normal.         Procedures           ED Course      Results for orders placed or performed during the hospital encounter of 11/22/22   COVID-19 and FLU A/B PCR - Swab, Nasopharynx    Specimen: Nasopharynx; Swab   Result Value Ref Range    COVID19 Not Detected Not Detected - Ref. Range    Influenza A PCR Not Detected Not Detected    Influenza B PCR Not Detected Not Detected   Comprehensive Metabolic Panel    Specimen: Blood   Result Value Ref Range    Glucose 570 (C) 65 - 99 mg/dL    BUN 19 6 - 20  mg/dL    Creatinine 0.87 0.76 - 1.27 mg/dL    Sodium 129 (L) 136 - 145 mmol/L    Potassium 3.7 3.5 - 5.2 mmol/L    Chloride 91 (L) 98 - 107 mmol/L    CO2 21.0 (L) 22.0 - 29.0 mmol/L    Calcium 9.3 8.6 - 10.5 mg/dL    Total Protein 7.1 6.0 - 8.5 g/dL    Albumin 4.00 3.50 - 5.20 g/dL    ALT (SGPT) 40 1 - 41 U/L    AST (SGOT) 19 1 - 40 U/L    Alkaline Phosphatase 91 39 - 117 U/L    Total Bilirubin 0.7 0.0 - 1.2 mg/dL    Globulin 3.1 gm/dL    A/G Ratio 1.3 g/dL    BUN/Creatinine Ratio 21.8 7.0 - 25.0    Anion Gap 17.0 (H) 5.0 - 15.0 mmol/L    eGFR 120.5 >60.0 mL/min/1.73   Urinalysis With Microscopic If Indicated (No Culture) - Urine, Clean Catch    Specimen: Urine, Clean Catch   Result Value Ref Range    Color, UA Yellow Yellow, Straw, Dark Yellow, Oriana    Appearance, UA Cloudy (A) Clear    pH, UA <=5.0 5.0 - 9.0    Specific Gravity, UA 1.036 (H) 1.003 - 1.030    Glucose, UA >=1000 mg/dL (3+) (A) Negative    Ketones, UA Negative Negative    Bilirubin, UA Negative Negative    Blood, UA Negative Negative    Protein, UA Trace (A) Negative    Leuk Esterase, UA Negative Negative    Nitrite, UA Negative Negative    Urobilinogen, UA 0.2 E.U./dL 0.2 - 1.0 E.U./dL   CBC Auto Differential    Specimen: Blood   Result Value Ref Range    WBC 12.30 (H) 3.40 - 10.80 10*3/mm3    RBC 5.41 4.14 - 5.80 10*6/mm3    Hemoglobin 14.9 13.0 - 17.7 g/dL    Hematocrit 43.3 37.5 - 51.0 %    MCV 80.0 79.0 - 97.0 fL    MCH 27.5 26.6 - 33.0 pg    MCHC 34.4 31.5 - 35.7 g/dL    RDW 13.7 12.3 - 15.4 %    RDW-SD 38.8 37.0 - 54.0 fl    MPV 10.2 6.0 - 12.0 fL    Platelets 291 140 - 450 10*3/mm3    Neutrophil % 62.2 42.7 - 76.0 %    Lymphocyte % 26.5 19.6 - 45.3 %    Monocyte % 6.9 5.0 - 12.0 %    Eosinophil % 3.5 0.3 - 6.2 %    Basophil % 0.6 0.0 - 1.5 %    Immature Grans % 0.3 0.0 - 0.5 %    Neutrophils, Absolute 7.65 (H) 1.70 - 7.00 10*3/mm3    Lymphocytes, Absolute 3.26 (H) 0.70 - 3.10 10*3/mm3    Monocytes, Absolute 0.85 0.10 - 0.90 10*3/mm3     Eosinophils, Absolute 0.43 (H) 0.00 - 0.40 10*3/mm3    Basophils, Absolute 0.07 0.00 - 0.20 10*3/mm3    Immature Grans, Absolute 0.04 0.00 - 0.05 10*3/mm3    nRBC 0.0 0.0 - 0.2 /100 WBC   Acetone    Specimen: Blood   Result Value Ref Range    Acetone Negative Negative   POC Glucose Once    Specimen: Blood   Result Value Ref Range    Glucose 367 (H) 70 - 130 mg/dL   Green Top (Gel)   Result Value Ref Range    Extra Tube Hold for add-ons.    Lavender Top   Result Value Ref Range    Extra Tube hold for add-on    Gold Top - SST   Result Value Ref Range    Extra Tube Hold for add-ons.    Light Blue Top   Result Value Ref Range    Extra Tube Hold for add-ons.              MDM  Number of Diagnoses or Management Options  Hyperglycemia: new and requires workup  Diagnosis management comments: Vital signs are stable, afebrile.  Labs significant for glucose of 570, acetone negative.  COVID and flu negative.  Patient received IVF bolus and IV insulin.  Visual acuity is reassuring.  Patient states he needs new glasses, but has not brought them.  His heart rate has improved.  Recommend follow-up with PCP.  Return precautions given.  Patient states understanding and is agreeable to the plan.      Final diagnoses:   Hyperglycemia       ED Disposition  ED Disposition     ED Disposition   Discharge    Condition   Stable    Comment   --             Man Arambula MD  [address] [place] [address] [address] [place] [address] [phone]    Schedule an appointment as soon as possible for a visit in 2 days  ER follow up         Medication List      Changed    * metFORMIN  MG 24 hr tablet  Commonly known as: GLUCOPHAGE-XR  Take 2 tablets by mouth 2 (Two) Times a Day Before Meals for 120 days.  What changed: Another medication with the same name was added. Make sure you understand how and when to take each.     * metFORMIN 500 MG tablet  Commonly known as: GLUCOPHAGE  Take 1 tablet by mouth 2 (Two) Times a Day With Meals.  What changed: You were  already taking a medication with the same name, and this prescription was added. Make sure you understand how and when to take each.         * This list has 2 medication(s) that are the same as other medications prescribed for you. Read the directions carefully, and ask your doctor or other care provider to review them with you.               Where to Get Your Medications      These medications were sent to [facility] - [place], [place] - [address] - [phone]  - [phone]   [address], [facility] [address]    Phone: [phone]   · metFORMIN 500 MG tablet          Arun Dye MD  11/22/22 [id number]

## 2022-11-28 ENCOUNTER — TELEPHONE (OUTPATIENT)
Dept: ONCOLOGY | Facility: HOSPITAL | Age: 27
End: 2022-11-28

## 2022-11-28 NOTE — TELEPHONE ENCOUNTER
Called patient today.  I had run into him yesterday at [facility] on my day off and he told me that he was recently in the ER significantly elevated blood sugar.  I looked back at his chart and see that the only medication he is on currently is metformin that I can tell.  Called to advise him that given those elevated blood sugars which are consistently elevated in his record and given his last HbA1c he needs to likely be on insulin.  Advised him on the phone that I did not have any appointments for the month of December but that I would like him to call the clinic and left the number sometime to try to get an appointment this week to see someone there.  One of my colleagues can certainly put him on what he needs to be on.  Having relayed this message to him I then hung up.    This document has been electronically signed by Man Arambula MD on November 28, 2022 13:48 CST

## 2023-01-09 ENCOUNTER — HOSPITAL ENCOUNTER (OUTPATIENT)
Facility: HOSPITAL | Age: 28
Setting detail: OBSERVATION
Discharge: HOME | End: 2023-01-10
Attending: FAMILY MEDICINE | Admitting: FAMILY MEDICINE
Payer: MEDICAID

## 2023-01-09 ENCOUNTER — APPOINTMENT (OUTPATIENT)
Dept: GENERAL RADIOLOGY | Facility: HOSPITAL | Age: 28
End: 2023-01-09
Payer: MEDICAID

## 2023-01-09 DIAGNOSIS — E11.9 TYPE 2 DIABETES MELLITUS WITHOUT COMPLICATION, WITHOUT LONG-TERM CURRENT USE OF INSULIN: Chronic | ICD-10-CM

## 2023-01-09 DIAGNOSIS — R07.2 PRECORDIAL PAIN: Primary | ICD-10-CM

## 2023-01-09 LAB
ALBUMIN SERPL-MCNC: 3.8 G/DL (ref 3.5–5.2)
ALBUMIN/GLOB SERPL: 1.3 G/DL
ALP SERPL-CCNC: 79 U/L (ref 39–117)
ALT SERPL W P-5'-P-CCNC: 46 U/L (ref 1–41)
ANION GAP SERPL CALCULATED.3IONS-SCNC: 11 MMOL/L (ref 5–15)
AST SERPL-CCNC: 20 U/L (ref 1–40)
BASOPHILS # BLD AUTO: 0.05 10*3/MM3 (ref 0–0.2)
BASOPHILS NFR BLD AUTO: 0.5 % (ref 0–1.5)
BILIRUB SERPL-MCNC: 0.7 MG/DL (ref 0–1.2)
BUN SERPL-MCNC: 10 MG/DL (ref 6–20)
BUN/CREAT SERPL: 14.5 (ref 7–25)
CALCIUM SPEC-SCNC: 8.7 MG/DL (ref 8.6–10.5)
CHLORIDE SERPL-SCNC: 101 MMOL/L (ref 98–107)
CK SERPL-CCNC: 65 U/L (ref 20–200)
CO2 SERPL-SCNC: 23 MMOL/L (ref 22–29)
CREAT SERPL-MCNC: 0.69 MG/DL (ref 0.76–1.27)
DEPRECATED RDW RBC AUTO: 39.5 FL (ref 37–54)
EGFRCR SERPLBLD CKD-EPI 2021: 129.3 ML/MIN/1.73
EOSINOPHIL # BLD AUTO: 0.29 10*3/MM3 (ref 0–0.4)
EOSINOPHIL NFR BLD AUTO: 3 % (ref 0.3–6.2)
ERYTHROCYTE [DISTWIDTH] IN BLOOD BY AUTOMATED COUNT: 13.5 % (ref 12.3–15.4)
GLOBULIN UR ELPH-MCNC: 2.9 GM/DL
GLUCOSE SERPL-MCNC: 280 MG/DL (ref 65–99)
HCT VFR BLD AUTO: 44 % (ref 37.5–51)
HGB BLD-MCNC: 14.8 G/DL (ref 13–17.7)
HOLD SPECIMEN: NORMAL
IMM GRANULOCYTES # BLD AUTO: 0.04 10*3/MM3 (ref 0–0.05)
IMM GRANULOCYTES NFR BLD AUTO: 0.4 % (ref 0–0.5)
INR PPP: 1.03 (ref 0.8–1.2)
LYMPHOCYTES # BLD AUTO: 3.32 10*3/MM3 (ref 0.7–3.1)
LYMPHOCYTES NFR BLD AUTO: 34.1 % (ref 19.6–45.3)
MCH RBC QN AUTO: 27.6 PG (ref 26.6–33)
MCHC RBC AUTO-ENTMCNC: 33.6 G/DL (ref 31.5–35.7)
MCV RBC AUTO: 82.1 FL (ref 79–97)
MONOCYTES # BLD AUTO: 0.46 10*3/MM3 (ref 0.1–0.9)
MONOCYTES NFR BLD AUTO: 4.7 % (ref 5–12)
NEUTROPHILS NFR BLD AUTO: 5.57 10*3/MM3 (ref 1.7–7)
NEUTROPHILS NFR BLD AUTO: 57.3 % (ref 42.7–76)
NRBC BLD AUTO-RTO: 0 /100 WBC (ref 0–0.2)
NT-PROBNP SERPL-MCNC: 47.2 PG/ML (ref 0–450)
PLATELET # BLD AUTO: 283 10*3/MM3 (ref 140–450)
PMV BLD AUTO: 9.8 FL (ref 6–12)
POTASSIUM SERPL-SCNC: 3.4 MMOL/L (ref 3.5–5.2)
PROT SERPL-MCNC: 6.7 G/DL (ref 6–8.5)
PROTHROMBIN TIME: 13.4 SECONDS (ref 11.1–15.3)
QT INTERVAL: 374 MS
QTC INTERVAL: 457 MS
RBC # BLD AUTO: 5.36 10*6/MM3 (ref 4.14–5.8)
SODIUM SERPL-SCNC: 135 MMOL/L (ref 136–145)
TROPONIN T SERPL-MCNC: <0.01 NG/ML (ref 0–0.03)
TROPONIN T SERPL-MCNC: <0.01 NG/ML (ref 0–0.03)
WBC NRBC COR # BLD: 9.73 10*3/MM3 (ref 3.4–10.8)

## 2023-01-09 PROCEDURE — 93005 ELECTROCARDIOGRAM TRACING: CPT

## 2023-01-09 PROCEDURE — G0378 HOSPITAL OBSERVATION PER HR: HCPCS

## 2023-01-09 PROCEDURE — 71045 X-RAY EXAM CHEST 1 VIEW: CPT

## 2023-01-09 PROCEDURE — 99284 EMERGENCY DEPT VISIT MOD MDM: CPT

## 2023-01-09 PROCEDURE — 93010 ELECTROCARDIOGRAM REPORT: CPT | Performed by: INTERNAL MEDICINE

## 2023-01-09 PROCEDURE — 82550 ASSAY OF CK (CPK): CPT | Performed by: FAMILY MEDICINE

## 2023-01-09 PROCEDURE — 36415 COLL VENOUS BLD VENIPUNCTURE: CPT | Performed by: STUDENT IN AN ORGANIZED HEALTH CARE EDUCATION/TRAINING PROGRAM

## 2023-01-09 PROCEDURE — 84484 ASSAY OF TROPONIN QUANT: CPT | Performed by: STUDENT IN AN ORGANIZED HEALTH CARE EDUCATION/TRAINING PROGRAM

## 2023-01-09 PROCEDURE — 84484 ASSAY OF TROPONIN QUANT: CPT | Performed by: FAMILY MEDICINE

## 2023-01-09 PROCEDURE — 80053 COMPREHEN METABOLIC PANEL: CPT | Performed by: FAMILY MEDICINE

## 2023-01-09 PROCEDURE — 85610 PROTHROMBIN TIME: CPT | Performed by: FAMILY MEDICINE

## 2023-01-09 PROCEDURE — 85025 COMPLETE CBC W/AUTO DIFF WBC: CPT | Performed by: FAMILY MEDICINE

## 2023-01-09 PROCEDURE — 80061 LIPID PANEL: CPT | Performed by: STUDENT IN AN ORGANIZED HEALTH CARE EDUCATION/TRAINING PROGRAM

## 2023-01-09 PROCEDURE — 83880 ASSAY OF NATRIURETIC PEPTIDE: CPT | Performed by: FAMILY MEDICINE

## 2023-01-09 PROCEDURE — 93005 ELECTROCARDIOGRAM TRACING: CPT | Performed by: FAMILY MEDICINE

## 2023-01-09 RX ORDER — DEXTROSE MONOHYDRATE 25 G/50ML
25 INJECTION, SOLUTION INTRAVENOUS
Status: DISCONTINUED | OUTPATIENT
Start: 2023-01-09 | End: 2023-01-10 | Stop reason: HOSPADM

## 2023-01-09 RX ORDER — FAMOTIDINE 40 MG/1
40 TABLET, FILM COATED ORAL DAILY
Status: DISCONTINUED | OUTPATIENT
Start: 2023-01-10 | End: 2023-01-10 | Stop reason: HOSPADM

## 2023-01-09 RX ORDER — SODIUM CHLORIDE 9 MG/ML
40 INJECTION, SOLUTION INTRAVENOUS AS NEEDED
Status: DISCONTINUED | OUTPATIENT
Start: 2023-01-09 | End: 2023-01-10 | Stop reason: HOSPADM

## 2023-01-09 RX ORDER — ACETAMINOPHEN 325 MG/1
650 TABLET ORAL EVERY 4 HOURS PRN
Status: DISCONTINUED | OUTPATIENT
Start: 2023-01-09 | End: 2023-01-10 | Stop reason: HOSPADM

## 2023-01-09 RX ORDER — LOSARTAN POTASSIUM 25 MG/1
25 TABLET ORAL
Status: DISCONTINUED | OUTPATIENT
Start: 2023-01-10 | End: 2023-01-10 | Stop reason: HOSPADM

## 2023-01-09 RX ORDER — LANOLIN ALCOHOL/MO/W.PET/CERES
5 CREAM (GRAM) TOPICAL NIGHTLY PRN
Status: DISCONTINUED | OUTPATIENT
Start: 2023-01-09 | End: 2023-01-10 | Stop reason: HOSPADM

## 2023-01-09 RX ORDER — NICOTINE POLACRILEX 4 MG
15 LOZENGE BUCCAL
Status: DISCONTINUED | OUTPATIENT
Start: 2023-01-09 | End: 2023-01-10 | Stop reason: HOSPADM

## 2023-01-09 RX ORDER — ONDANSETRON 4 MG/1
4 TABLET, FILM COATED ORAL EVERY 6 HOURS PRN
Status: DISCONTINUED | OUTPATIENT
Start: 2023-01-09 | End: 2023-01-10 | Stop reason: HOSPADM

## 2023-01-09 RX ORDER — AMOXICILLIN 250 MG
2 CAPSULE ORAL 2 TIMES DAILY
Status: DISCONTINUED | OUTPATIENT
Start: 2023-01-09 | End: 2023-01-10 | Stop reason: HOSPADM

## 2023-01-09 RX ORDER — BISACODYL 5 MG/1
5 TABLET, DELAYED RELEASE ORAL DAILY PRN
Status: DISCONTINUED | OUTPATIENT
Start: 2023-01-09 | End: 2023-01-10 | Stop reason: HOSPADM

## 2023-01-09 RX ORDER — ALBUTEROL SULFATE 2.5 MG/3ML
2.5 SOLUTION RESPIRATORY (INHALATION) EVERY 4 HOURS PRN
Status: DISCONTINUED | OUTPATIENT
Start: 2023-01-09 | End: 2023-01-10 | Stop reason: HOSPADM

## 2023-01-09 RX ORDER — SODIUM CHLORIDE 0.9 % (FLUSH) 0.9 %
10 SYRINGE (ML) INJECTION EVERY 12 HOURS SCHEDULED
Status: DISCONTINUED | OUTPATIENT
Start: 2023-01-09 | End: 2023-01-10 | Stop reason: HOSPADM

## 2023-01-09 RX ORDER — INSULIN ASPART 100 [IU]/ML
0-7 INJECTION, SOLUTION INTRAVENOUS; SUBCUTANEOUS
Status: DISCONTINUED | OUTPATIENT
Start: 2023-01-10 | End: 2023-01-10 | Stop reason: HOSPADM

## 2023-01-09 RX ORDER — POLYETHYLENE GLYCOL 3350 17 G/17G
17 POWDER, FOR SOLUTION ORAL DAILY PRN
Status: DISCONTINUED | OUTPATIENT
Start: 2023-01-09 | End: 2023-01-10 | Stop reason: HOSPADM

## 2023-01-09 RX ORDER — BISACODYL 10 MG
10 SUPPOSITORY, RECTAL RECTAL DAILY PRN
Status: DISCONTINUED | OUTPATIENT
Start: 2023-01-09 | End: 2023-01-10 | Stop reason: HOSPADM

## 2023-01-09 RX ORDER — SODIUM CHLORIDE 0.9 % (FLUSH) 0.9 %
10 SYRINGE (ML) INJECTION AS NEEDED
Status: DISCONTINUED | OUTPATIENT
Start: 2023-01-09 | End: 2023-01-10 | Stop reason: HOSPADM

## 2023-01-09 NOTE — Clinical Note
[facility] EMERGENCY DEPARTMENT  [address] [place] [id number]  Phone: [phone]    Venkatesh Parra was seen and treated in our emergency department on 1/9/2023.  He may return to work on 01/11/2023.         Thank you for choosing [facility].    Miguelangel Landrum MD

## 2023-01-09 NOTE — Clinical Note
[facility] EMERGENCY DEPARTMENT  [address] [place] [id number]  Phone: [phone]    Venkatesh Parra was seen and treated in our emergency department on 1/9/2023.  He may return to work on 01/10/2023.         Thank you for choosing [facility].    Miguelangel Landrum MD

## 2023-01-09 NOTE — Clinical Note
Level of Care: Telemetry [5]   Diagnosis: Precordial pain [786.51.ICD-9-CM]   Admitting Physician: CHANDRA RIGGINS [[id number]]   Attending Physician: CHANDRA RIGGINS [[id number]]

## 2023-01-09 NOTE — Clinical Note
[facility] EMERGENCY DEPARTMENT  [address] [place] [id number]  Phone: [phone]    Venkatesh Parra was seen and treated in our emergency department on 1/9/2023.  He may return to work on 01/10/2023.         Thank you for choosing [facility].    Miguelangel Landrum MD

## 2023-01-10 ENCOUNTER — READMISSION MANAGEMENT (OUTPATIENT)
Dept: CALL CENTER | Facility: HOSPITAL | Age: 28
End: 2023-01-10
Payer: MEDICAID

## 2023-01-10 VITALS
TEMPERATURE: 98.6 F | HEIGHT: 66 IN | BODY MASS INDEX: 50.62 KG/M2 | WEIGHT: 315 LBS | SYSTOLIC BLOOD PRESSURE: 138 MMHG | RESPIRATION RATE: 20 BRPM | HEART RATE: 90 BPM | OXYGEN SATURATION: 97 % | DIASTOLIC BLOOD PRESSURE: 74 MMHG

## 2023-01-10 PROBLEM — E78.2 MIXED HYPERLIPIDEMIA: Status: ACTIVE | Noted: 2023-01-10

## 2023-01-10 LAB
ALBUMIN SERPL-MCNC: 3.7 G/DL (ref 3.5–5.2)
ALBUMIN/GLOB SERPL: 1.5 G/DL
ALP SERPL-CCNC: 72 U/L (ref 39–117)
ALT SERPL W P-5'-P-CCNC: 43 U/L (ref 1–41)
ANION GAP SERPL CALCULATED.3IONS-SCNC: 10 MMOL/L (ref 5–15)
AST SERPL-CCNC: 22 U/L (ref 1–40)
BASOPHILS # BLD AUTO: 0.06 10*3/MM3 (ref 0–0.2)
BASOPHILS NFR BLD AUTO: 0.5 % (ref 0–1.5)
BILIRUB SERPL-MCNC: 0.8 MG/DL (ref 0–1.2)
BUN SERPL-MCNC: 11 MG/DL (ref 6–20)
BUN/CREAT SERPL: 14.5 (ref 7–25)
CALCIUM SPEC-SCNC: 8.9 MG/DL (ref 8.6–10.5)
CHLORIDE SERPL-SCNC: 100 MMOL/L (ref 98–107)
CHOLEST SERPL-MCNC: 205 MG/DL (ref 0–200)
CO2 SERPL-SCNC: 25 MMOL/L (ref 22–29)
CREAT SERPL-MCNC: 0.76 MG/DL (ref 0.76–1.27)
DEPRECATED RDW RBC AUTO: 40.9 FL (ref 37–54)
EGFRCR SERPLBLD CKD-EPI 2021: 125.6 ML/MIN/1.73
EOSINOPHIL # BLD AUTO: 0.5 10*3/MM3 (ref 0–0.4)
EOSINOPHIL NFR BLD AUTO: 4.5 % (ref 0.3–6.2)
ERYTHROCYTE [DISTWIDTH] IN BLOOD BY AUTOMATED COUNT: 13.6 % (ref 12.3–15.4)
GLOBULIN UR ELPH-MCNC: 2.4 GM/DL
GLUCOSE BLDC GLUCOMTR-MCNC: 251 MG/DL (ref 70–130)
GLUCOSE BLDC GLUCOMTR-MCNC: 281 MG/DL (ref 70–130)
GLUCOSE BLDC GLUCOMTR-MCNC: 329 MG/DL (ref 70–130)
GLUCOSE SERPL-MCNC: 297 MG/DL (ref 65–99)
HBA1C MFR BLD: 11.8 % (ref 4.8–5.6)
HCT VFR BLD AUTO: 42.2 % (ref 37.5–51)
HDLC SERPL-MCNC: 29 MG/DL (ref 40–60)
HGB BLD-MCNC: 14.1 G/DL (ref 13–17.7)
IMM GRANULOCYTES # BLD AUTO: 0.07 10*3/MM3 (ref 0–0.05)
IMM GRANULOCYTES NFR BLD AUTO: 0.6 % (ref 0–0.5)
LDLC SERPL CALC-MCNC: 105 MG/DL (ref 0–100)
LDLC/HDLC SERPL: 3.19 {RATIO}
LYMPHOCYTES # BLD AUTO: 4.16 10*3/MM3 (ref 0.7–3.1)
LYMPHOCYTES NFR BLD AUTO: 37.4 % (ref 19.6–45.3)
MCH RBC QN AUTO: 27.8 PG (ref 26.6–33)
MCHC RBC AUTO-ENTMCNC: 33.4 G/DL (ref 31.5–35.7)
MCV RBC AUTO: 83.2 FL (ref 79–97)
MONOCYTES # BLD AUTO: 0.76 10*3/MM3 (ref 0.1–0.9)
MONOCYTES NFR BLD AUTO: 6.8 % (ref 5–12)
NEUTROPHILS NFR BLD AUTO: 5.57 10*3/MM3 (ref 1.7–7)
NEUTROPHILS NFR BLD AUTO: 50.2 % (ref 42.7–76)
NRBC BLD AUTO-RTO: 0 /100 WBC (ref 0–0.2)
PLATELET # BLD AUTO: 254 10*3/MM3 (ref 140–450)
PMV BLD AUTO: 10.2 FL (ref 6–12)
POTASSIUM SERPL-SCNC: 3.7 MMOL/L (ref 3.5–5.2)
PROT SERPL-MCNC: 6.1 G/DL (ref 6–8.5)
RBC # BLD AUTO: 5.07 10*6/MM3 (ref 4.14–5.8)
SODIUM SERPL-SCNC: 135 MMOL/L (ref 136–145)
TRIGL SERPL-MCNC: 418 MG/DL (ref 0–150)
TROPONIN T SERPL-MCNC: <0.01 NG/ML (ref 0–0.03)
VLDLC SERPL-MCNC: 71 MG/DL (ref 5–40)
WBC NRBC COR # BLD: 11.12 10*3/MM3 (ref 3.4–10.8)

## 2023-01-10 PROCEDURE — 63710000001 INSULIN REGULAR HUMAN PER 5 UNITS: Performed by: STUDENT IN AN ORGANIZED HEALTH CARE EDUCATION/TRAINING PROGRAM

## 2023-01-10 PROCEDURE — 82962 GLUCOSE BLOOD TEST: CPT

## 2023-01-10 PROCEDURE — 63710000001 INSULIN ASPART PER 5 UNITS: Performed by: STUDENT IN AN ORGANIZED HEALTH CARE EDUCATION/TRAINING PROGRAM

## 2023-01-10 PROCEDURE — 80053 COMPREHEN METABOLIC PANEL: CPT | Performed by: STUDENT IN AN ORGANIZED HEALTH CARE EDUCATION/TRAINING PROGRAM

## 2023-01-10 PROCEDURE — 84484 ASSAY OF TROPONIN QUANT: CPT | Performed by: FAMILY MEDICINE

## 2023-01-10 PROCEDURE — 85025 COMPLETE CBC W/AUTO DIFF WBC: CPT | Performed by: STUDENT IN AN ORGANIZED HEALTH CARE EDUCATION/TRAINING PROGRAM

## 2023-01-10 PROCEDURE — G0378 HOSPITAL OBSERVATION PER HR: HCPCS

## 2023-01-10 PROCEDURE — 83036 HEMOGLOBIN GLYCOSYLATED A1C: CPT | Performed by: STUDENT IN AN ORGANIZED HEALTH CARE EDUCATION/TRAINING PROGRAM

## 2023-01-10 RX ORDER — BLOOD SUGAR DIAGNOSTIC
1 STRIP MISCELLANEOUS 4 TIMES DAILY
Qty: 100 EACH | Refills: 0 | Status: SHIPPED | OUTPATIENT
Start: 2023-01-10

## 2023-01-10 RX ORDER — INSULIN LISPRO 100 [IU]/ML
3 INJECTION, SOLUTION INTRAVENOUS; SUBCUTANEOUS
Qty: 15 ML | Refills: 0 | Status: SHIPPED | OUTPATIENT
Start: 2023-01-10

## 2023-01-10 RX ORDER — INSULIN ASPART 100 [IU]/ML
5 INJECTION, SOLUTION INTRAVENOUS; SUBCUTANEOUS
Status: DISCONTINUED | OUTPATIENT
Start: 2023-01-10 | End: 2023-01-10 | Stop reason: HOSPADM

## 2023-01-10 RX ORDER — POTASSIUM CHLORIDE 1.5 G/1.77G
40 POWDER, FOR SOLUTION ORAL ONCE
Status: COMPLETED | OUTPATIENT
Start: 2023-01-10 | End: 2023-01-10

## 2023-01-10 RX ORDER — BLOOD-GLUCOSE METER
1 KIT MISCELLANEOUS AS NEEDED
Qty: 1 EACH | Refills: 0 | Status: SHIPPED | OUTPATIENT
Start: 2023-01-10 | End: 2024-01-10

## 2023-01-10 RX ORDER — IBUPROFEN 400 MG/1
400 TABLET ORAL EVERY 8 HOURS SCHEDULED
Status: DISCONTINUED | OUTPATIENT
Start: 2023-01-10 | End: 2023-01-10 | Stop reason: HOSPADM

## 2023-01-10 RX ORDER — ATORVASTATIN CALCIUM 10 MG/1
10 TABLET, FILM COATED ORAL NIGHTLY
Status: DISCONTINUED | OUTPATIENT
Start: 2023-01-10 | End: 2023-01-10 | Stop reason: HOSPADM

## 2023-01-10 RX ADMIN — HUMAN INSULIN 4 UNITS: 100 INJECTION, SOLUTION SUBCUTANEOUS at 12:19

## 2023-01-10 RX ADMIN — POTASSIUM CHLORIDE 40 MEQ: 1.5 POWDER, FOR SOLUTION ORAL at 06:13

## 2023-01-10 RX ADMIN — HUMAN INSULIN 5 UNITS: 100 INJECTION, SOLUTION SUBCUTANEOUS at 12:19

## 2023-01-10 RX ADMIN — LOSARTAN POTASSIUM 25 MG: 25 TABLET, FILM COATED ORAL at 09:46

## 2023-01-10 RX ADMIN — INSULIN ASPART 5 UNITS: 100 INJECTION, SOLUTION INTRAVENOUS; SUBCUTANEOUS at 10:22

## 2023-01-10 RX ADMIN — Medication 10 ML: at 00:30

## 2023-01-10 RX ADMIN — Medication 10 ML: at 09:46

## 2023-01-10 RX ADMIN — INSULIN HUMAN 4 UNITS: 100 INJECTION, SOLUTION PARENTERAL at 07:14

## 2023-01-10 RX ADMIN — IBUPROFEN 400 MG: 400 TABLET, FILM COATED ORAL at 06:12

## 2023-01-10 RX ADMIN — IBUPROFEN 400 MG: 400 TABLET, FILM COATED ORAL at 14:32

## 2023-01-10 RX ADMIN — FAMOTIDINE 40 MG: 40 TABLET ORAL at 09:46

## 2023-01-10 NOTE — H&P
"    HISTORY AND PHYSICAL  NAME: Venkatesh Parra  : 1995  MRN: [id number]    DATE OF ADMISSION:  2023     DATE & TIME SEEN: 23 at 2117     PCP: Man Arambula MD    CODE STATUS: Full code    CHIEF COMPLAINT:  Chest Pain    HPI:  Venkatesh Parra is a 27 y.o. male with a CMH of HTN, Asthma, DM, depression, PTSD, and morbid obesity who presents complaining of chest pain.    Patient sleeping and resting comfortably upon my walking into room.     Chest pain started around 4 hours prior to coming to ED about 1pm. Located at middle of chest and radiated to left arm. No jaw pain. Numbness and tingling in left arm. Pain was ache. Arm numbness has been constant but CP varies in intensity. Reports it feels like a truck sitting on the chest at times. Denies aggravating factors. No relieving factors. Tenderness to palpation of center of chest. Has had chest pain before but this pain is new. This chest pain caused soa. This chest pain is worse than chest pain he's had previously.     Has heartburn sometimes but this chest pain feels different. Reports heartburn feels like chest \"caught on fire on the inside.\"    No new medicines or adjustments. Quit taking medicines since April or May 2022. Says he moved and never picked up medicines.     Smokes 2 cigarettes, no alcohol use, no illicit drug use.     In ED,  EKG showed NSR. Trop negative x1. CBC grossly normal. CMP showed hypokalemia at 3.4 and glucose of 280 with Na 135. Otherwise grossly unremarkable for other acute pathology. BNP and CK wnl. CXR negative. ED provider called to admit patient for CP rule out.     CONCURRENT MEDICAL HISTORY:  Past Medical History:   Diagnosis Date   • Asthma    • Carpal tunnel syndrome    • Depression    • Diabetes mellitus (HCC)    • Hypertension    • Morbid obesity (HCC)    • Osteoarthritis    • PTSD (post-traumatic stress disorder)        PAST SURGICAL HISTORY:  Past Surgical History:   Procedure Laterality Date   • " CHOLECYSTECTOMY         FAMILY HISTORY:  Family History   Problem Relation Age of Onset   • Hypertension Father         SOCIAL HISTORY:  Social History     Socioeconomic History   • Marital status: Single   Tobacco Use   • Smoking status: Former     Packs/day: 1.00     Types: Cigarettes   • Smokeless tobacco: Current     Types: Chew   Vaping Use   • Vaping Use: Never used   Substance and Sexual Activity   • Alcohol use: Not Currently   • Drug use: Never   • Sexual activity: Not Currently       HOME MEDICATIONS:  Prior to Admission medications    Medication Sig Start Date End Date Taking? Authorizing Provider   albuterol sulfate HFA (Ventolin HFA) 108 (90 Base) MCG/ACT inhaler Inhale 2 puffs Every 4 (Four) Hours As Needed for Wheezing. 8/15/22   Uche Mena APRN   atorvastatin (LIPITOR) 10 MG tablet Take 1 tablet by mouth Daily.    Provider, MD Mitchel   metFORMIN (GLUCOPHAGE) 500 MG tablet Take 1 tablet by mouth 2 (Two) Times a Day With Meals. 11/22/22   Arun Dye MD   metFORMIN ER (GLUCOPHAGE-XR) 500 MG 24 hr tablet Take 2 tablets by mouth 2 (Two) Times a Day Before Meals for 120 days. 2/9/22 6/9/22  Consuelo Mcneal MD   montelukast (SINGULAIR) 10 MG tablet Take 1 tablet by mouth Every Night for 30 days. 2/9/22 1/9/23  Consuelo Mcneal MD   promethazine-dextromethorphan (PROMETHAZINE-DM) 6.25-15 MG/5ML syrup Take 5 mL by mouth 4 (Four) Times a Day As Needed for Cough. 10/28/22   Margarita Montaño APRN   Liraglutide (Victoza) 18 MG/3ML solution pen-injector injection Inject 0.6 mg under the skin into the appropriate area as directed Daily. 3/9/22 8/15/22  Natalie Goldman MD   losartan (COZAAR) 50 MG tablet Take 1 tablet by mouth Daily. 2/9/22 8/15/22  Consuelo Mcneal MD       ALLERGIES:  Lorcet [hydrocodone-acetaminophen], Amoxicillin, and Penicillins    REVIEW OF SYSTEMS  Review of Systems   Constitutional: Negative for fever.   HENT: Positive for rhinorrhea.     Respiratory: Positive for cough and shortness of breath.    Cardiovascular: Positive for chest pain. Negative for palpitations and leg swelling.   Gastrointestinal: Negative for abdominal pain, constipation, diarrhea, nausea and vomiting.   Genitourinary: Negative for difficulty urinating.   Neurological: Negative for dizziness and light-headedness.       PHYSICAL EXAM:  Temp:  [97.4 °F (36.3 °C)-98.1 °F (36.7 °C)] 97.4 °F (36.3 °C)  Heart Rate:  [74-92] 74  Resp:  [18] 18  BP: (106-155)/(55-97) 106/61  Body mass index is 61.33 kg/m².  Physical Exam  Constitutional:       General: He is not in acute distress.     Appearance: He is obese.   HENT:      Head: Normocephalic and atraumatic.   Eyes:      Conjunctiva/sclera: Conjunctivae normal.   Cardiovascular:      Rate and Rhythm: Normal rate and regular rhythm.      Pulses: Normal pulses.   Pulmonary:      Effort: Pulmonary effort is normal.      Breath sounds: No rhonchi or rales.   Abdominal:      General: Bowel sounds are normal.      Palpations: Abdomen is soft.      Tenderness: There is no abdominal tenderness.   Musculoskeletal:      Right lower leg: No edema.      Left lower leg: No edema.   Skin:     General: Skin is warm.      Capillary Refill: Capillary refill takes less than 2 seconds.   Neurological:      Mental Status: He is alert. Mental status is at baseline.      Comments: Paresthesia of left arm to palpation   Psychiatric:         Mood and Affect: Mood normal.         Behavior: Behavior normal.         DIAGNOSTIC DATA:   Lab Results (last 24 hours)     Procedure Component Value Units Date/Time    Troponin [[id number]]  (Normal) Collected: 01/09/23 2330    Specimen: Blood Updated: 01/09/23 2353     Troponin T <0.010 ng/mL     Narrative:      Troponin T Reference Range:  <= 0.03 ng/mL-   Negative for AMI  >0.03 ng/mL-     Abnormal for myocardial necrosis.  Clinicians would have to utilize clinical acumen, EKG, Troponin and serial changes to determine  if it is an Acute Myocardial Infarction or myocardial injury due to an underlying chronic condition.       Results may be falsely decreased if patient taking Biotin.      Extra Tubes [[id number]] Collected: 01/09/23 1922    Specimen: Blood, Venous Line Updated: 01/09/23 2031    Narrative:      The following orders were created for panel order Extra Tubes.  Procedure                               Abnormality         Status                     ---------                               -----------         ------                     Gold Top - SST[[id number]]                                   Final result                 Please view results for these tests on the individual orders.    Gold Top - SST [[id number]] Collected: 01/09/23 1922    Specimen: Blood Updated: 01/09/23 2031     Extra Tube Hold for add-ons.     Comment: Auto resulted.       Protime-INR [[id number]]  (Normal) Collected: 01/09/23 1917    Specimen: Blood Updated: 01/09/23 1955     Protime 13.4 Seconds      INR 1.03    Narrative:      Therapeutic range for most indications is 2.0-3.0 INR,  or 2.5-3.5 for mechanical heart valves.    Comprehensive Metabolic Panel [[id number]]  (Abnormal) Collected: 01/09/23 1917    Specimen: Blood Updated: 01/09/23 1946     Glucose 280 mg/dL      BUN 10 mg/dL      Creatinine 0.69 mg/dL      Sodium 135 mmol/L      Potassium 3.4 mmol/L      Chloride 101 mmol/L      CO2 23.0 mmol/L      Calcium 8.7 mg/dL      Total Protein 6.7 g/dL      Albumin 3.8 g/dL      ALT (SGPT) 46 U/L      AST (SGOT) 20 U/L      Alkaline Phosphatase 79 U/L      Total Bilirubin 0.7 mg/dL      Globulin 2.9 gm/dL      A/G Ratio 1.3 g/dL      BUN/Creatinine Ratio 14.5     Anion Gap 11.0 mmol/L      eGFR 129.3 mL/min/1.73      Comment: National Kidney Foundation and American Society of Nephrology (ASN) Task Force recommended calculation based on the Chronic Kidney Disease Epidemiology Collaboration (CKD-EPI) equation refit without adjustment for race.        Narrative:      GFR Normal >60  Chronic Kidney Disease <60  Kidney Failure <15      CK [[id number]]  (Normal) Collected: 01/09/23 1917    Specimen: Blood Updated: 01/09/23 1946     Creatine Kinase 65 U/L     Troponin [[id number]]  (Normal) Collected: 01/09/23 1917    Specimen: Blood Updated: 01/09/23 1946     Troponin T <0.010 ng/mL     Narrative:      Troponin T Reference Range:  <= 0.03 ng/mL-   Negative for AMI  >0.03 ng/mL-     Abnormal for myocardial necrosis.  Clinicians would have to utilize clinical acumen, EKG, Troponin and serial changes to determine if it is an Acute Myocardial Infarction or myocardial injury due to an underlying chronic condition.       Results may be falsely decreased if patient taking Biotin.      BNP [[id number]]  (Normal) Collected: 01/09/23 1917    Specimen: Blood Updated: 01/09/23 1943     proBNP 47.2 pg/mL     Narrative:      Among patients with dyspnea, NT-proBNP is highly sensitive for the detection of acute congestive heart failure. In addition NT-proBNP of <300 pg/ml effectively rules out acute congestive heart failure with 99% negative predictive value.      CBC & Differential [[id number]]  (Abnormal) Collected: 01/09/23 1917    Specimen: Blood Updated: 01/09/23 1924    Narrative:      The following orders were created for panel order CBC & Differential.  Procedure                               Abnormality         Status                     ---------                               -----------         ------                     CBC Auto Differential[[id number]]        Abnormal            Final result                 Please view results for these tests on the individual orders.    CBC Auto Differential [[id number]]  (Abnormal) Collected: 01/09/23 1917    Specimen: Blood Updated: 01/09/23 1924     WBC 9.73 10*3/mm3      RBC 5.36 10*6/mm3      Hemoglobin 14.8 g/dL      Hematocrit 44.0 %      MCV 82.1 fL      MCH 27.6 pg      MCHC 33.6 g/dL      RDW 13.5 %      RDW-SD 39.5 fl      MPV  9.8 fL      Platelets 283 10*3/mm3      Neutrophil % 57.3 %      Lymphocyte % 34.1 %      Monocyte % 4.7 %      Eosinophil % 3.0 %      Basophil % 0.5 %      Immature Grans % 0.4 %      Neutrophils, Absolute 5.57 10*3/mm3      Lymphocytes, Absolute 3.32 10*3/mm3      Monocytes, Absolute 0.46 10*3/mm3      Eosinophils, Absolute 0.29 10*3/mm3      Basophils, Absolute 0.05 10*3/mm3      Immature Grans, Absolute 0.04 10*3/mm3      nRBC 0.0 /100 WBC            Imaging Results (Last 24 Hours)     Procedure Component Value Units Date/Time    XR Chest 1 View [[id number]] Collected: 01/09/23 1930     Updated: 01/09/23 2018    Narrative:      XR CHEST 1 VIEW    COMPARISONS: March 7, 2022    ADDITIONAL PERTINENT HISTORY: Chest pain    FINDINGS:  Cardiomediastinal silhouette:  Negative.    Pulmonary vasculature:  Negative.    Lung fields:  Negative.    Pleural spaces: Negative.    Osseous structures:  Negative.    Surrounding soft tissues:  Negative.        Impression:      No acute cardiopulmonary disease.    Electronically signed by:  Jose J Glasgow MD  1/9/2023 8:16 PM CST  Workstation: [id number]            I reviewed the patient's new clinical results.    ASSESSMENT AND PLAN: This is a 27 y.o. male with:    Active Hospital Problems    Diagnosis  POA   • **Precordial pain [R07.2]  Yes   • Mixed hyperlipidemia [E78.2]  Unknown   • Hypertension [I10]  Yes   • Diabetes mellitus (HCC) [E11.9]  Yes     Precordial Pain  - Costochondritis vs esophagitis vs unstable angina  - EKG in ED NSR  - CXR negative  - trop negative x2  - BNP wnl  - CK wnl  - continue to monitor    HTN   - restart home losartan    DM  - glucose 280 on admission  - SSI   - hold home metformin    Mixed HLD  - consider restarting home lipitor  - lipid panel     DVT prophylaxis: SCDs/TEDs     Venkatesh Parra and I have discussed pain goals for this hospitalization after reviewing his current clinical condition, medical history and prior pain experiences.  The goal  is to keep the pain level tolerable.  To help achieve this, I plan to multimodal modalities.    PDMP, reviewed and consistent with patient reported medications.    Expected Length of Stay: Where: home and When:  1-2days    I discussed the patient's findings and my recommendations with patient.     Rivas Ortiz MD is the attending on record at time of admission, He is aware of the patient's status and agrees with the above history and physical.      This document has been electronically signed by David Cervantes MD on January 10, 2023 00:38 CST

## 2023-01-10 NOTE — ACP (ADVANCE CARE PLANNING)
The patient desires to be full code. He designates Charisse Parra (mother) who can be contacted at [phone] to make medical decisions on his behalf if He is incapable of doing so. Patient declared said statement while fully alert and oriented on 01/09/23 at 21:12 CST.      This document has been electronically signed by David Cervantes MD on January 9, 2023 21:13 CST

## 2023-01-10 NOTE — ED NOTES
Nursing report ED to floor  Venkatesh Parra  27 y.o.  male    HPI:   Chief Complaint   Patient presents with    Chest Pain    Numbness       Admitting doctor:   Rivas Ortiz MD    Consulting provider(s):  Consults       Date and Time Order Name Status Description    1/9/2023  9:35 PM Family Practice - Faculty (on-call MD unless specified)               Admitting diagnosis:   The encounter diagnosis was Precordial pain.    Code status:   Current Code Status       Date Active Code Status Order ID Comments User Context       1/9/2023 2223 CPR (Attempt to Resuscitate) [id number]  David Cervantes MD ED        Question Answer    Code Status (Patient has no pulse and is not breathing) CPR (Attempt to Resuscitate)    Medical Interventions (Patient has pulse or is breathing) Full Support    Level Of Support Discussed With Patient                    Allergies:   Lorcet [hydrocodone-acetaminophen], Amoxicillin, and Penicillins    Intake and Output  No intake or output data in the 24 hours ending 01/09/23 2229    Weight:       01/09/23  1838   Weight: (!) 172 kg (380 lb)       Most recent vitals:   Vitals:    01/09/23 2047 01/09/23 2126 01/09/23 2135 01/09/23 2141   BP:  123/63  124/74   BP Location:       Patient Position:       Pulse:  80 82 86   Resp: 18      Temp:       TempSrc:       SpO2:  98% 99% 96%   Weight:       Height:         Oxygen Therapy: room air    Active LDAs/IV Access:   Lines, Drains & Airways       Active LDAs       Name Placement date Placement time Site Days    Peripheral IV 01/09/23 1916 Right Antecubital 01/09/23 1916  Antecubital  less than 1                    Labs (abnormal labs have a star):   Labs Reviewed   COMPREHENSIVE METABOLIC PANEL - Abnormal; Notable for the following components:       Result Value    Glucose 280 (*)     Creatinine 0.69 (*)     Sodium 135 (*)     Potassium 3.4 (*)     ALT (SGPT) 46 (*)     All other components within normal limits    Narrative:     GFR Normal  >60  Chronic Kidney Disease <60  Kidney Failure <15     CBC WITH AUTO DIFFERENTIAL - Abnormal; Notable for the following components:    Monocyte % 4.7 (*)     Lymphocytes, Absolute 3.32 (*)     All other components within normal limits   PROTIME-INR - Normal    Narrative:     Therapeutic range for most indications is 2.0-3.0 INR,  or 2.5-3.5 for mechanical heart valves.   BNP (IN-HOUSE) - Normal    Narrative:     Among patients with dyspnea, NT-proBNP is highly sensitive for the detection of acute congestive heart failure. In addition NT-proBNP of <300 pg/ml effectively rules out acute congestive heart failure with 99% negative predictive value.     CK - Normal   TROPONIN (IN-HOUSE) - Normal    Narrative:     Troponin T Reference Range:  <= 0.03 ng/mL-   Negative for AMI  >0.03 ng/mL-     Abnormal for myocardial necrosis.  Clinicians would have to utilize clinical acumen, EKG, Troponin and serial changes to determine if it is an Acute Myocardial Infarction or myocardial injury due to an underlying chronic condition.       Results may be falsely decreased if patient taking Biotin.     COMPREHENSIVE METABOLIC PANEL   TROPONIN (IN-HOUSE)   TROPONIN (IN-HOUSE)   LIPID PANEL   CBC WITH AUTO DIFFERENTIAL   CBC AND DIFFERENTIAL    Narrative:     The following orders were created for panel order CBC & Differential.  Procedure                               Abnormality         Status                     ---------                               -----------         ------                     CBC Auto Differential[[id number]]        Abnormal            Final result                 Please view results for these tests on the individual orders.   EXTRA TUBES    Narrative:     The following orders were created for panel order Extra Tubes.  Procedure                               Abnormality         Status                     ---------                               -----------         ------                     Gold Top -  [facility][[id number]]                                   Final result                 Please view results for these tests on the individual orders.   [facility]   CBC AND DIFFERENTIAL    Narrative:     The following orders were created for panel order CBC & Differential.  Procedure                               Abnormality         Status                     ---------                               -----------         ------                     CBC Auto Differential[[id number]]                                                         Please view results for these tests on the individual orders.       Meds given in ED:   Medications   albuterol sulfate HFA (PROVENTIL HFA;VENTOLIN HFA;PROAIR HFA) inhaler 2 puff (has no administration in time range)   sodium chloride 0.9 % flush 10 mL (has no administration in time range)   sodium chloride 0.9 % flush 10 mL (has no administration in time range)   sodium chloride 0.9 % infusion 40 mL (has no administration in time range)   acetaminophen (TYLENOL) tablet 650 mg (has no administration in time range)   melatonin tablet 5.25 mg (has no administration in time range)   sennosides-docusate (PERICOLACE) 8.6-50 MG per tablet 2 tablet (has no administration in time range)     And   polyethylene glycol (MIRALAX) packet 17 g (has no administration in time range)     And   bisacodyl (DULCOLAX) EC tablet 5 mg (has no administration in time range)     And   bisacodyl (DULCOLAX) suppository 10 mg (has no administration in time range)   ondansetron (ZOFRAN) tablet 4 mg (has no administration in time range)   famotidine (PEPCID) tablet 40 mg (has no administration in time range)           NIH Stroke Scale:       Isolation/Infection(s):  No active isolations   No active infections     COVID Testing  Collected: no  Resulted: no    Nursing report ED to floor:  Mental status: A&O x 4  Ambulatory status: Independent  Precautions: N/A    ED nurse phone [phone]

## 2023-01-10 NOTE — OUTREACH NOTE
Prep Survey    Flowsheet Row Responses   [facility] facility patient discharged from? [facility]   Is LACE score < 7 ? Yes   Eligibility [facility]   Date of Admission 01/09/23   Date of Discharge 01/10/23   Discharge Disposition Home or Self Care   Discharge diagnosis Stroke-like symptom    Does the patient have one of the following disease processes/diagnoses(primary or secondary)? Stroke   Does the patient have Home health ordered? No   Is there a DME ordered? No   Prep survey completed? Yes          KALEB SOMERS - Registered Nurse

## 2023-01-10 NOTE — PROGRESS NOTES
FAMILY MEDICINE DAILY PROGRESS NOTE  NAME: Venkatesh Parra  : 1995  MRN: [id number]     LOS: 0 days     PROVIDER OF SERVICE: Miguelangel Landrum MD    Chief Complaint: Precordial pain    Subjective:     Interval History:  History taken from: Patient, RN and chart    Patient no acute events overnight.  Vital signs have been stable, blood pressure somewhat labile.  This morning patient's chest pain feels resolved now is experiencing more low back pain.  Numbness in his left arm has resolved.  Patient's blood sugar has still been elevated.  Most recent blood sugar this morning was 251.  Per patient home he only takes metformin but recently has been attempting lifestyle changes alone.    Review of Systems:   Review of Systems   Constitutional: Negative for chills, fatigue and fever.   HENT: Negative for ear pain, hearing loss, postnasal drip and sore throat.    Eyes: Negative for photophobia and pain.   Respiratory: Negative for cough, shortness of breath and wheezing.    Cardiovascular: Negative for chest pain, palpitations and leg swelling.   Gastrointestinal: Negative for abdominal pain, diarrhea, nausea and vomiting.   Genitourinary: Negative for difficulty urinating and dysuria.   Musculoskeletal: Positive for back pain. Negative for arthralgias and myalgias.   Skin: Negative for rash and wound.   Neurological: Negative for syncope, weakness and headaches.   Psychiatric/Behavioral: Negative for dysphoric mood. The patient is not nervous/anxious.        Objective:     Vital Signs  Temp:  [97.4 °F (36.3 °C)-98.9 °F (37.2 °C)] 98.6 °F (37 °C)  Heart Rate:  [74-92] 80  Resp:  [16-20] 18  BP: (106-155)/(55-97) 134/82    Physical Exam  Physical Exam  Vitals reviewed.   Constitutional:       General: He is not in acute distress.  HENT:      Head: Normocephalic and atraumatic.      Mouth/Throat:      Pharynx: No oropharyngeal exudate.   Eyes:      General: No scleral icterus.     Conjunctiva/sclera: Conjunctivae  normal.      Pupils: Pupils are equal, round, and reactive to light.   Cardiovascular:      Rate and Rhythm: Normal rate and regular rhythm.      Heart sounds: Normal heart sounds. No murmur heard.  Pulmonary:      Effort: Pulmonary effort is normal.      Breath sounds: Normal breath sounds. No wheezing, rhonchi or rales.   Abdominal:      General: Bowel sounds are normal. There is no distension.      Palpations: Abdomen is soft.      Tenderness: There is no abdominal tenderness. There is no guarding or rebound.   Musculoskeletal:         General: No deformity.      Right lower leg: No edema.      Left lower leg: No edema.   Skin:     General: Skin is warm and dry.   Neurological:      Mental Status: He is alert and oriented to person, place, and time.         Medication Review    Current Facility-Administered Medications:   •  acetaminophen (TYLENOL) tablet 650 mg, 650 mg, Oral, Q4H PRN, David Cervantes MD  •  albuterol (PROVENTIL) nebulizer solution 0.083% 2.5 mg/3mL, 2.5 mg, Nebulization, Q4H PRN, David Cervantes MD  •  sennosides-docusate (PERICOLACE) 8.6-50 MG per tablet 2 tablet, 2 tablet, Oral, BID **AND** polyethylene glycol (MIRALAX) packet 17 g, 17 g, Oral, Daily PRN **AND** bisacodyl (DULCOLAX) EC tablet 5 mg, 5 mg, Oral, Daily PRN **AND** bisacodyl (DULCOLAX) suppository 10 mg, 10 mg, Rectal, Daily PRN, David Cervantes MD  •  dextrose (D50W) (25 g/50 mL) IV injection 25 g, 25 g, Intravenous, Q15 Min PRN, David Cervantes MD  •  dextrose (GLUTOSE) oral gel 15 g, 15 g, Oral, Q15 Min PRN, David Cervantes MD  •  famotidine (PEPCID) tablet 40 mg, 40 mg, Oral, Daily, David Cervantes MD  •  glucagon (human recombinant) (GLUCAGEN DIAGNOSTIC) injection 1 mg, 1 mg, Intramuscular, Q15 Min PRN, David Cervantes MD  •  ibuprofen (ADVIL,MOTRIN) tablet 400 mg, 400 mg, Oral, Q8H, David Cervantes MD, 400 mg at 01/10/23 0612  •  Insulin Aspart (novoLOG) injection 0-7  Units, 0-7 Units, Subcutaneous, TID AC, David Cervantes MD  •  losartan (COZAAR) tablet 25 mg, 25 mg, Oral, Q24H, David Cervantes MD  •  melatonin tablet 5.25 mg, 5.25 mg, Oral, Nightly PRN, David Cervantes MD  •  ondansetron (ZOFRAN) tablet 4 mg, 4 mg, Oral, Q6H PRN, David Cervantes MD  •  sodium chloride 0.9 % flush 10 mL, 10 mL, Intravenous, Q12H, David Cervantes MD, 10 mL at 01/10/23 0030  •  sodium chloride 0.9 % flush 10 mL, 10 mL, Intravenous, PRN, David Cervantes MD  •  sodium chloride 0.9 % infusion 40 mL, 40 mL, Intravenous, PRN, David Cervantes MD    Current Outpatient Medications:   •  albuterol sulfate HFA (Ventolin HFA) 108 (90 Base) MCG/ACT inhaler, Inhale 2 puffs Every 4 (Four) Hours As Needed for Wheezing., Disp: 18 g, Rfl: 0  •  atorvastatin (LIPITOR) 10 MG tablet, Take 1 tablet by mouth Daily., Disp: , Rfl:   •  metFORMIN (GLUCOPHAGE) 500 MG tablet, Take 1 tablet by mouth 2 (Two) Times a Day With Meals., Disp: 6 tablet, Rfl: 0  •  metFORMIN ER (GLUCOPHAGE-XR) 500 MG 24 hr tablet, Take 2 tablets by mouth 2 (Two) Times a Day Before Meals for 120 days., Disp: 120 tablet, Rfl: 3  •  montelukast (SINGULAIR) 10 MG tablet, Take 1 tablet by mouth Every Night for 30 days., Disp: 30 tablet, Rfl: 3  •  promethazine-dextromethorphan (PROMETHAZINE-DM) 6.25-15 MG/5ML syrup, Take 5 mL by mouth 4 (Four) Times a Day As Needed for Cough., Disp: 150 mL, Rfl: 0     Diagnostic Data    Lab Results (last 24 hours)     Procedure Component Value Units Date/Time    POC Glucose Once [[id number]]  (Abnormal) Collected: 01/10/23 0605    Specimen: Blood Updated: 01/10/23 0616     Glucose 251 mg/dL      Comment: Result Not ConfirmedOperator: [id number] VESNA [facility] HealthARONMeter ID: [id number]       Comprehensive Metabolic Panel [[id number]]  (Abnormal) Collected: 01/10/23 0216    Specimen: Blood Updated: 01/10/23 0311     Glucose 297 mg/dL      BUN 11 mg/dL      Creatinine 0.76 mg/dL       Sodium 135 mmol/L      Potassium 3.7 mmol/L      Chloride 100 mmol/L      CO2 25.0 mmol/L      Calcium 8.9 mg/dL      Total Protein 6.1 g/dL      Albumin 3.7 g/dL      ALT (SGPT) 43 U/L      AST (SGOT) 22 U/L      Alkaline Phosphatase 72 U/L      Total Bilirubin 0.8 mg/dL      Globulin 2.4 gm/dL      A/G Ratio 1.5 g/dL      BUN/Creatinine Ratio 14.5     Anion Gap 10.0 mmol/L      eGFR 125.6 mL/min/1.73      Comment: National Kidney Foundation and American Society of Nephrology (ASN) Task Force recommended calculation based on the Chronic Kidney Disease Epidemiology Collaboration (CKD-EPI) equation refit without adjustment for race.       Narrative:      GFR Normal >60  Chronic Kidney Disease <60  Kidney Failure <15      Troponin [[id number]]  (Normal) Collected: 01/10/23 0216    Specimen: Blood Updated: 01/10/23 0309     Troponin T <0.010 ng/mL     Narrative:      Troponin T Reference Range:  <= 0.03 ng/mL-   Negative for AMI  >0.03 ng/mL-     Abnormal for myocardial necrosis.  Clinicians would have to utilize clinical acumen, EKG, Troponin and serial changes to determine if it is an Acute Myocardial Infarction or myocardial injury due to an underlying chronic condition.       Results may be falsely decreased if patient taking Biotin.      CBC & Differential [[id number]]  (Abnormal) Collected: 01/10/23 0216    Specimen: Blood Updated: 01/10/23 0257    Narrative:      The following orders were created for panel order CBC & Differential.  Procedure                               Abnormality         Status                     ---------                               -----------         ------                     CBC Auto Differential[[id number]]        Abnormal            Final result               Scan Slide[[id number]]                                                                    Please view results for these tests on the individual orders.    CBC Auto Differential [[id number]]  (Abnormal) Collected: 01/10/23 0216     Specimen: Blood Updated: 01/10/23 0257     WBC 11.12 10*3/mm3      RBC 5.07 10*6/mm3      Hemoglobin 14.1 g/dL      Hematocrit 42.2 %      MCV 83.2 fL      MCH 27.8 pg      MCHC 33.4 g/dL      RDW 13.6 %      RDW-SD 40.9 fl      MPV 10.2 fL      Platelets 254 10*3/mm3      Neutrophil % 50.2 %      Lymphocyte % 37.4 %      Monocyte % 6.8 %      Eosinophil % 4.5 %      Basophil % 0.5 %      Immature Grans % 0.6 %      Neutrophils, Absolute 5.57 10*3/mm3      Lymphocytes, Absolute 4.16 10*3/mm3      Monocytes, Absolute 0.76 10*3/mm3      Eosinophils, Absolute 0.50 10*3/mm3      Basophils, Absolute 0.06 10*3/mm3      Immature Grans, Absolute 0.07 10*3/mm3      nRBC 0.0 /100 WBC     Lipid Panel [[id number]]  (Abnormal) Collected: 01/09/23 2330    Specimen: Blood Updated: 01/10/23 0053     Total Cholesterol 205 mg/dL      Triglycerides 418 mg/dL      HDL Cholesterol 29 mg/dL      LDL Cholesterol  105 mg/dL      VLDL Cholesterol 71 mg/dL      LDL/HDL Ratio 3.19    Narrative:      Cholesterol Reference Ranges  (U.S. Department of Health and Human Services ATP III Classifications)    Desirable          <200 mg/dL  Borderline High    200-239 mg/dL  High Risk          >240 mg/dL      Triglyceride Reference Ranges  (U.S. Department of Health and Human Services ATP III Classifications)    Normal           <150 mg/dL  Borderline High  150-199 mg/dL  High             200-499 mg/dL  Very High        >500 mg/dL    HDL Reference Ranges  (U.S. Department of Health and Human Services ATP III Classifications)    Low     <40 mg/dl (major risk factor for CHD)  High    >60 mg/dl ('negative' risk factor for CHD)        LDL Reference Ranges  (U.S. Department of Health and Human Services ATP III Classifications)    Optimal          <100 mg/dL  Near Optimal     100-129 mg/dL  Borderline High  130-159 mg/dL  High             160-189 mg/dL  Very High        >189 mg/dL    Troponin [[id number]]  (Normal) Collected: 01/09/23 2330    Specimen:  Blood Updated: 01/09/23 2353     Troponin T <0.010 ng/mL     Narrative:      Troponin T Reference Range:  <= 0.03 ng/mL-   Negative for AMI  >0.03 ng/mL-     Abnormal for myocardial necrosis.  Clinicians would have to utilize clinical acumen, EKG, Troponin and serial changes to determine if it is an Acute Myocardial Infarction or myocardial injury due to an underlying chronic condition.       Results may be falsely decreased if patient taking Biotin.      Extra Tubes [[id number]] Collected: 01/09/23 1922    Specimen: Blood, Venous Line Updated: 01/09/23 2031    Narrative:      The following orders were created for panel order Extra Tubes.  Procedure                               Abnormality         Status                     ---------                               -----------         ------                     Gold Top - SST[[id number]]                                   Final result                 Please view results for these tests on the individual orders.    Gold Top - SST [[id number]] Collected: 01/09/23 1922    Specimen: Blood Updated: 01/09/23 2031     Extra Tube Hold for add-ons.     Comment: Auto resulted.       Protime-INR [[id number]]  (Normal) Collected: 01/09/23 1917    Specimen: Blood Updated: 01/09/23 1955     Protime 13.4 Seconds      INR 1.03    Narrative:      Therapeutic range for most indications is 2.0-3.0 INR,  or 2.5-3.5 for mechanical heart valves.    Comprehensive Metabolic Panel [[id number]]  (Abnormal) Collected: 01/09/23 1917    Specimen: Blood Updated: 01/09/23 1946     Glucose 280 mg/dL      BUN 10 mg/dL      Creatinine 0.69 mg/dL      Sodium 135 mmol/L      Potassium 3.4 mmol/L      Chloride 101 mmol/L      CO2 23.0 mmol/L      Calcium 8.7 mg/dL      Total Protein 6.7 g/dL      Albumin 3.8 g/dL      ALT (SGPT) 46 U/L      AST (SGOT) 20 U/L      Alkaline Phosphatase 79 U/L      Total Bilirubin 0.7 mg/dL      Globulin 2.9 gm/dL      A/G Ratio 1.3 g/dL      BUN/Creatinine Ratio 14.5     Anion  Gap 11.0 mmol/L      eGFR 129.3 mL/min/1.73      Comment: National Kidney Foundation and American Society of Nephrology (ASN) Task Force recommended calculation based on the Chronic Kidney Disease Epidemiology Collaboration (CKD-EPI) equation refit without adjustment for race.       Narrative:      GFR Normal >60  Chronic Kidney Disease <60  Kidney Failure <15      CK [[id number]]  (Normal) Collected: 01/09/23 1917    Specimen: Blood Updated: 01/09/23 1946     Creatine Kinase 65 U/L     Troponin [[id number]]  (Normal) Collected: 01/09/23 1917    Specimen: Blood Updated: 01/09/23 1946     Troponin T <0.010 ng/mL     Narrative:      Troponin T Reference Range:  <= 0.03 ng/mL-   Negative for AMI  >0.03 ng/mL-     Abnormal for myocardial necrosis.  Clinicians would have to utilize clinical acumen, EKG, Troponin and serial changes to determine if it is an Acute Myocardial Infarction or myocardial injury due to an underlying chronic condition.       Results may be falsely decreased if patient taking Biotin.      BNP [[id number]]  (Normal) Collected: 01/09/23 1917    Specimen: Blood Updated: 01/09/23 1943     proBNP 47.2 pg/mL     Narrative:      Among patients with dyspnea, NT-proBNP is highly sensitive for the detection of acute congestive heart failure. In addition NT-proBNP of <300 pg/ml effectively rules out acute congestive heart failure with 99% negative predictive value.      CBC & Differential [[id number]]  (Abnormal) Collected: 01/09/23 1917    Specimen: Blood Updated: 01/09/23 1924    Narrative:      The following orders were created for panel order CBC & Differential.  Procedure                               Abnormality         Status                     ---------                               -----------         ------                     CBC Auto Differential[[id number]]        Abnormal            Final result                 Please view results for these tests on the individual orders.    CBC Auto Differential  [[id number]]  (Abnormal) Collected: 01/09/23 1917    Specimen: Blood Updated: 01/09/23 1924     WBC 9.73 10*3/mm3      RBC 5.36 10*6/mm3      Hemoglobin 14.8 g/dL      Hematocrit 44.0 %      MCV 82.1 fL      MCH 27.6 pg      MCHC 33.6 g/dL      RDW 13.5 %      RDW-SD 39.5 fl      MPV 9.8 fL      Platelets 283 10*3/mm3      Neutrophil % 57.3 %      Lymphocyte % 34.1 %      Monocyte % 4.7 %      Eosinophil % 3.0 %      Basophil % 0.5 %      Immature Grans % 0.4 %      Neutrophils, Absolute 5.57 10*3/mm3      Lymphocytes, Absolute 3.32 10*3/mm3      Monocytes, Absolute 0.46 10*3/mm3      Eosinophils, Absolute 0.29 10*3/mm3      Basophils, Absolute 0.05 10*3/mm3      Immature Grans, Absolute 0.04 10*3/mm3      nRBC 0.0 /100 WBC            Imaging Results (Last 24 Hours)     Procedure Component Value Units Date/Time    XR Chest 1 View [[id number]] Collected: 01/09/23 1930     Updated: 01/09/23 2018    Narrative:      XR CHEST 1 VIEW    COMPARISONS: March 7, 2022    ADDITIONAL PERTINENT HISTORY: Chest pain    FINDINGS:  Cardiomediastinal silhouette:  Negative.    Pulmonary vasculature:  Negative.    Lung fields:  Negative.    Pleural spaces: Negative.    Osseous structures:  Negative.    Surrounding soft tissues:  Negative.        Impression:      No acute cardiopulmonary disease.    Electronically signed by:  Jose J Glasgow MD  1/9/2023 8:16 PM CST  Workstation: [id number]          I reviewed the patient's new clinical results.    Assessment/Plan:       Precordial pain    Hypertension    Diabetes mellitus (HCC)    Mixed hyperlipidemia    Mr. Parra is a 27-year-old male who was admitted for precordial chest pain.  Current cardiac work-up is seemingly negative.  Troponins negative x3, chest x-ray showed no acute processes, EKG showed normal sinus rhythm without ST segment changes.  Chest pain has since resolved.  Per overnight physician this was reproducible on palpation.  This morning patient does still have some tenderness  to palpation of the sternum.  On noncardiac differentials of chest pain this moves costochondritis higher on the list.  Patient does have a reported history of GERD but denies burning sensation or correlation to food.    #Precordial Pain  - Costochondritis vs esophagitis vs unstable angina  - EKG in ED NSR  - CXR negative  - trop negative x3  - BNP wnl  - CK wnl  - continue to monitor     #HTN   - restart home losartan     #DM  - glucose 280 on admission  - SSI  - hold home metformin     #Mixed HLD  -Reconfirmed on lipid panel  -Restart home statin    DVT prophylaxis: SCDs/TEDs  Code Status and Medical Interventions:   Ordered at: 01/09/23 2223     Level Of Support Discussed With:    Patient     Code Status (Patient has no pulse and is not breathing):    CPR (Attempt to Resuscitate)     Medical Interventions (Patient has pulse or is breathing):    Full Support       Plan for disposition:home today or tomorrow      Time: 15 minutes       Miguelangel Lanrdum MD   PGY[facility]    [place] [address]  Office: [phone]    This document has been electronically signed by Miguelangel Landrum MD on January 10, 2023 07:34 CST

## 2023-01-10 NOTE — ED PROVIDER NOTES
Subjective   History of Present Illness  Patient presents to the emergency department shortness of breath and chest pain that began 4 hours prior to arrival.  He states that chest pain radiates to his left arm and causes left arm numbness.  He has a history of hypertension, diabetes mellitus, hypercholesterolemia and obesity.  He denies a history of coronary disease, but does admit to TIA x3 in the past.       Chest Pain  Pain location:  Substernal area  Pain quality: pressure    Pain radiates to:  L arm  Pain severity:  Moderate  Duration:  4 hours  Timing:  Intermittent  Progression:  Waxing and waning  Chronicity:  Recurrent  Relieved by:  Nothing  Worsened by:  Nothing  Ineffective treatments:  Leaning forward  Associated symptoms: cough, nausea and numbness (left arm)    Associated symptoms: no abdominal pain, no diaphoresis, no dizziness, no dysphagia, no fatigue, no fever, no headache, no shortness of breath, no vomiting and no weakness    Risk factors: diabetes mellitus, high cholesterol, hypertension, obesity and smoking    Risk factors: no coronary artery disease        Review of Systems   Constitutional: Negative for appetite change, chills, diaphoresis, fatigue and fever.   HENT: Negative for congestion, ear discharge, ear pain, nosebleeds, rhinorrhea, sinus pressure, sore throat and trouble swallowing.    Eyes: Negative for discharge and redness.   Respiratory: Positive for cough. Negative for apnea, chest tightness, shortness of breath and wheezing.    Cardiovascular: Positive for chest pain.   Gastrointestinal: Positive for nausea. Negative for abdominal pain, diarrhea and vomiting.   Endocrine: Negative for polyuria.   Genitourinary: Negative for dysuria, frequency and urgency.   Musculoskeletal: Negative for myalgias and neck pain.   Skin: Negative for color change and rash.   Allergic/Immunologic: Negative for immunocompromised state.   Neurological: Positive for numbness (left arm). Negative for  dizziness, seizures, syncope, weakness, light-headedness and headaches.   Hematological: Negative for adenopathy. Does not bruise/bleed easily.   Psychiatric/Behavioral: Negative for behavioral problems and confusion.   All other systems reviewed and are negative.      Past Medical History:   Diagnosis Date   • Asthma    • Carpal tunnel syndrome    • Depression    • Diabetes mellitus (HCC)    • Hypertension    • Morbid obesity (HCC)    • Osteoarthritis    • PTSD (post-traumatic stress disorder)        Allergies   Allergen Reactions   • Lorcet [Hydrocodone-Acetaminophen] Mental Status Change   • Amoxicillin Rash   • Penicillins Rash     redness       Past Surgical History:   Procedure Laterality Date   • CHOLECYSTECTOMY         Family History   Problem Relation Age of Onset   • Hypertension Father        Social History     Socioeconomic History   • Marital status: Single   Tobacco Use   • Smoking status: Former     Packs/day: 1.00     Types: Cigarettes   • Smokeless tobacco: Current     Types: Chew   Vaping Use   • Vaping Use: Never used   Substance and Sexual Activity   • Alcohol use: Not Currently   • Drug use: Never   • Sexual activity: Not Currently           Objective   Physical Exam  Vitals and nursing note reviewed.   Constitutional:       Appearance: He is well-developed.   HENT:      Head: Normocephalic and atraumatic.      Nose: Nose normal.   Eyes:      General: No scleral icterus.        Right eye: No discharge.         Left eye: No discharge.      Conjunctiva/sclera: Conjunctivae normal.      Pupils: Pupils are equal, round, and reactive to light.   Neck:      Trachea: No tracheal deviation.   Cardiovascular:      Rate and Rhythm: Normal rate and regular rhythm.      Heart sounds: Normal heart sounds. No murmur heard.  Pulmonary:      Effort: Pulmonary effort is normal. No respiratory distress.      Breath sounds: Normal breath sounds. No stridor. No wheezing or rales.   Abdominal:      General: Bowel  sounds are normal. There is no distension.      Palpations: Abdomen is soft. There is no mass.      Tenderness: There is no abdominal tenderness. There is no guarding or rebound.   Musculoskeletal:      Cervical back: Normal range of motion and neck supple.   Skin:     General: Skin is warm and dry.      Findings: No erythema or rash.   Neurological:      Mental Status: He is alert and oriented to person, place, and time.      Coordination: Coordination normal.   Psychiatric:         Behavior: Behavior normal.         Thought Content: Thought content normal.         ECG 12 Lead      Date/Time: 1/9/2023 9:32 PM  Performed by: Kei Arango MD  Authorized by: Kei Arango MD   Interpreted by physician  Rate: normal  BPM: 90  ST Segments: ST segments normal                   ED Course        Labs Reviewed   COMPREHENSIVE METABOLIC PANEL - Abnormal; Notable for the following components:       Result Value    Glucose 280 (*)     Creatinine 0.69 (*)     Sodium 135 (*)     Potassium 3.4 (*)     ALT (SGPT) 46 (*)     All other components within normal limits    Narrative:     GFR Normal >60  Chronic Kidney Disease <60  Kidney Failure <15     CBC WITH AUTO DIFFERENTIAL - Abnormal; Notable for the following components:    Monocyte % 4.7 (*)     Lymphocytes, Absolute 3.32 (*)     All other components within normal limits   PROTIME-INR - Normal    Narrative:     Therapeutic range for most indications is 2.0-3.0 INR,  or 2.5-3.5 for mechanical heart valves.   BNP (IN-HOUSE) - Normal    Narrative:     Among patients with dyspnea, NT-proBNP is highly sensitive for the detection of acute congestive heart failure. In addition NT-proBNP of <300 pg/ml effectively rules out acute congestive heart failure with 99% negative predictive value.     CK - Normal   TROPONIN (IN-HOUSE) - Normal    Narrative:     Troponin T Reference Range:  <= 0.03 ng/mL-   Negative for AMI  >0.03 ng/mL-     Abnormal for myocardial necrosis.   Clinicians would have to utilize clinical acumen, EKG, Troponin and serial changes to determine if it is an Acute Myocardial Infarction or myocardial injury due to an underlying chronic condition.       Results may be falsely decreased if patient taking Biotin.     CBC AND DIFFERENTIAL    Narrative:     The following orders were created for panel order CBC & Differential.  Procedure                               Abnormality         Status                     ---------                               -----------         ------                     CBC Auto Differential[[id number]]        Abnormal            Final result                 Please view results for these tests on the individual orders.   EXTRA TUBES    Narrative:     The following orders were created for panel order Extra Tubes.  Procedure                               Abnormality         Status                     ---------                               -----------         ------                     Gold Top - SST[[id number]]                                   Final result                 Please view results for these tests on the individual orders.   GOLD TOP - SST       XR Chest 1 View   Final Result   No acute cardiopulmonary disease.      Electronically signed by:  Jose J Glasgow MD  1/9/2023 8:16 PM CST   Workstation: [id number]                  Labs Reviewed   COMPREHENSIVE METABOLIC PANEL - Abnormal; Notable for the following components:       Result Value    Glucose 280 (*)     Creatinine 0.69 (*)     Sodium 135 (*)     Potassium 3.4 (*)     ALT (SGPT) 46 (*)     All other components within normal limits    Narrative:     GFR Normal >60  Chronic Kidney Disease <60  Kidney Failure <15     CBC WITH AUTO DIFFERENTIAL - Abnormal; Notable for the following components:    Monocyte % 4.7 (*)     Lymphocytes, Absolute 3.32 (*)     All other components within normal limits   PROTIME-INR - Normal    Narrative:     Therapeutic range for most indications is 2.0-3.0  INR,  or 2.5-3.5 for mechanical heart valves.   BNP (IN-HOUSE) - Normal    Narrative:     Among patients with dyspnea, NT-proBNP is highly sensitive for the detection of acute congestive heart failure. In addition NT-proBNP of <300 pg/ml effectively rules out acute congestive heart failure with 99% negative predictive value.     CK - Normal   TROPONIN (IN-HOUSE) - Normal    Narrative:     Troponin T Reference Range:  <= 0.03 ng/mL-   Negative for AMI  >0.03 ng/mL-     Abnormal for myocardial necrosis.  Clinicians would have to utilize clinical acumen, EKG, Troponin and serial changes to determine if it is an Acute Myocardial Infarction or myocardial injury due to an underlying chronic condition.       Results may be falsely decreased if patient taking Biotin.     CBC AND DIFFERENTIAL    Narrative:     The following orders were created for panel order CBC & Differential.  Procedure                               Abnormality         Status                     ---------                               -----------         ------                     CBC Auto Differential[[id number]]        Abnormal            Final result                 Please view results for these tests on the individual orders.   EXTRA TUBES    Narrative:     The following orders were created for panel order Extra Tubes.  Procedure                               Abnormality         Status                     ---------                               -----------         ------                     Gold Top - SST[[id number]]                                   Final result                 Please view results for these tests on the individual orders.   GOLD TOP - SST       XR Chest 1 View   Final Result   No acute cardiopulmonary disease.      Electronically signed by:  Jose J lGasgow MD  1/9/2023 8:16 PM [facility]   Workstation: [id number]                                            Medical Decision Making  Precordial pain: acute illness or injury  Amount and/or  Complexity of Data Reviewed  Labs: ordered.  Radiology: ordered.  ECG/medicine tests: ordered and independent interpretation performed.      Risk  Decision regarding hospitalization.          Final diagnoses:   Precordial pain       ED Disposition  ED Disposition     ED Disposition   Decision to Admit    Condition   --    Comment   Level of Care: Telemetry [5]   Diagnosis: Precordial pain [786.51.ICD-9-CM]   Admitting Physician: CHANDRA RIGGINS [[id number]]   Attending Physician: CHANDRA RIGGINS [[id number]]               No follow-up provider specified.       Medication List      No changes were made to your prescriptions during this visit.         Kei Arango MD  01/09/23 2137    Kei Arango MD  01/09/23 2137

## 2023-01-10 NOTE — ED NOTES
Nursing report ED to floor  Venkatesh Parra  27 y.o.  male    HPI:   Chief Complaint   Patient presents with    Chest Pain    Numbness       Admitting doctor:   Rivas Ortiz MD    Consulting provider(s):  Consults       Date and Time Order Name Status Description    1/9/2023  9:35 PM Family Practice - Faculty (on-call MD unless specified)               Admitting diagnosis:   The encounter diagnosis was Precordial pain.    Code status:   Current Code Status       Date Active Code Status Order ID Comments User Context       Prior            Allergies:   Lorcet [hydrocodone-acetaminophen], Amoxicillin, and Penicillins    Intake and Output  No intake or output data in the 24 hours ending 01/09/23 2136    Weight:       01/09/23 1838   Weight: (!) 172 kg (380 lb)       Most recent vitals:   Vitals:    01/09/23 2045 01/09/23 2047 01/09/23 2126 01/09/23 2135   BP:   123/63    BP Location:       Patient Position:       Pulse: 76  80 82   Resp:  18     Temp:       TempSrc:       SpO2: 96%  98% 99%   Weight:       Height:         Oxygen Therapy: Room air    Active LDAs/IV Access:   Lines, Drains & Airways       Active LDAs       Name Placement date Placement time Site Days    Peripheral IV 01/09/23 1916 Right Antecubital 01/09/23 1916  Antecubital  less than 1                    Labs (abnormal labs have a star):   Labs Reviewed   COMPREHENSIVE METABOLIC PANEL - Abnormal; Notable for the following components:       Result Value    Glucose 280 (*)     Creatinine 0.69 (*)     Sodium 135 (*)     Potassium 3.4 (*)     ALT (SGPT) 46 (*)     All other components within normal limits    Narrative:     GFR Normal >60  Chronic Kidney Disease <60  Kidney Failure <15     CBC WITH AUTO DIFFERENTIAL - Abnormal; Notable for the following components:    Monocyte % 4.7 (*)     Lymphocytes, Absolute 3.32 (*)     All other components within normal limits   PROTIME-INR - Normal    Narrative:     Therapeutic range for most indications is  2.0-3.0 INR,  or 2.5-3.5 for mechanical heart valves.   BNP (IN-HOUSE) - Normal    Narrative:     Among patients with dyspnea, NT-proBNP is highly sensitive for the detection of acute congestive heart failure. In addition NT-proBNP of <300 pg/ml effectively rules out acute congestive heart failure with 99% negative predictive value.     CK - Normal   TROPONIN (IN-HOUSE) - Normal    Narrative:     Troponin T Reference Range:  <= 0.03 ng/mL-   Negative for AMI  >0.03 ng/mL-     Abnormal for myocardial necrosis.  Clinicians would have to utilize clinical acumen, EKG, Troponin and serial changes to determine if it is an Acute Myocardial Infarction or myocardial injury due to an underlying chronic condition.       Results may be falsely decreased if patient taking Biotin.     CBC AND DIFFERENTIAL    Narrative:     The following orders were created for panel order CBC & Differential.  Procedure                               Abnormality         Status                     ---------                               -----------         ------                     CBC Auto Differential[[id number]]        Abnormal            Final result                 Please view results for these tests on the individual orders.   EXTRA TUBES    Narrative:     The following orders were created for panel order Extra Tubes.  Procedure                               Abnormality         Status                     ---------                               -----------         ------                     Gold Top - SST[[id number]]                                   Final result                 Please view results for these tests on the individual orders.   GOLD TOP - SST       Meds given in ED:   Medications - No data to display  No current facility-administered medications for this encounter.       NIH Stroke Scale:       Isolation/Infection(s):  No active isolations   No active infections     COVID Testing  Collected: no  Resulted: no    Nursing report ED to  floor:  Mental status: A&O x 4  Ambulatory status: Independent  Precautions: n/a    ED nurse phone extentsilt- [phone]

## 2023-01-11 ENCOUNTER — TRANSITIONAL CARE MANAGEMENT TELEPHONE ENCOUNTER (OUTPATIENT)
Dept: CALL CENTER | Facility: HOSPITAL | Age: 28
End: 2023-01-11
Payer: MEDICAID

## 2023-01-11 NOTE — DISCHARGE SUMMARY
DISCHARGE SUMMARY    PATIENT NAME: Venkatesh Parra       PHYSICIAN: Miguelangel Landrum MD  : 1995  MRN: [id number]    ADMITTED: 2023     DISCHARGED: 1/10/23    ADMISSION DIAGNOSES:  Active Hospital Problems    Diagnosis  POA   • **Precordial pain [R07.2]  Yes   • Mixed hyperlipidemia [E78.2]  Unknown   • Hypertension [I10]  Yes   • Diabetes mellitus (HCC) [E11.9]  Yes      Resolved Hospital Problems   No resolved problems to display.     DISCHARGE DIAGNOSES:   Active Hospital Problems    Diagnosis  POA   • **Precordial pain [R07.2]  Yes   • Mixed hyperlipidemia [E78.2]  Unknown   • Hypertension [I10]  Yes   • Diabetes mellitus (HCC) [E11.9]  Yes      Resolved Hospital Problems   No resolved problems to display.       SERVICE: Family Medicine Residency  Attending: Rivas Ortiz MD  Resident: Miguelangel Landrum MD    CONSULTS:   Consult Orders (all) (From admission, onward)     Start     Ordered    23  Family Practice - Faculty (on-call MD unless specified)  Once,   Status:  Canceled        Specialty:  Family Medicine  Provider:  David Cervantes MD    23                PROCEDURES:   none    HISTORY OF PRESENT ILLNESS:     Venkatesh Parra is a 27 y.o. male with a CMH of HTN, Asthma, DM, depression, PTSD, and morbid obesity who presents complaining of chest pain.     Patient sleeping and resting comfortably upon my walking into room.      Chest pain started around 4 hours prior to coming to ED about 1pm. Located at middle of chest and radiated to left arm. No jaw pain. Numbness and tingling in left arm. Pain was ache. Arm numbness has been constant but CP varies in intensity. Reports it feels like a truck sitting on the chest at times. Denies aggravating factors. No relieving factors. Tenderness to palpation of center of chest. Has had chest pain before but this pain is new. This chest pain caused soa. This chest pain is worse than chest pain he's had previously.      Has  "heartburn sometimes but this chest pain feels different. Reports heartburn feels like chest \"caught on fire on the inside.\"     No new medicines or adjustments. Quit taking medicines since April or May 2022. Says he moved and never picked up medicines.      Smokes 2 cigarettes, no alcohol use, no illicit drug use.      In ED,  EKG showed NSR. Trop negative x1. CBC grossly normal. CMP showed hypokalemia at 3.4 and glucose of 280 with Na 135. Otherwise grossly unremarkable for other acute pathology. BNP and CK wnl. CXR negative. ED provider called to admit patient for CP rule out.    Per Dr. Cervantes, 1/9/23    DIAGNOSTIC DATA:     Lab Results (last 72 hours)     Procedure Component Value Units Date/Time    POC Glucose Once [[id number]]  (Abnormal) Collected: 01/10/23 1121    Specimen: Blood Updated: 01/10/23 1134     Glucose 281 mg/dL      Comment: RN NotifiedOperator: [id number] SIMEON LEAMeter ID: [id number]       Hemoglobin A1c [[id number]]  (Abnormal) Collected: 01/10/23 0216    Specimen: Blood Updated: 01/10/23 1047     Hemoglobin A1C 11.80 %     Narrative:      Hemoglobin A1C Ranges:    Increased Risk for Diabetes  5.7% to 6.4%  Diabetes                     >= 6.5%  Diabetic Goal                < 7.0%    POC Glucose Once [[id number]]  (Abnormal) Collected: 01/10/23 0927    Specimen: Blood Updated: 01/10/23 0939     Glucose 329 mg/dL      Comment: RN NotifiedOperator: [id number] PRIETO Orozcoer ID: [id number]       POC Glucose Once [[id number]]  (Abnormal) Collected: 01/10/23 0605    Specimen: Blood Updated: 01/10/23 0616     Glucose 251 mg/dL      Comment: Result Not ConfirmedOperator: [id number] VESNA WINTERSNMriky ID: [id number]       Comprehensive Metabolic Panel [[id number]]  (Abnormal) Collected: 01/10/23 0216    Specimen: Blood Updated: 01/10/23 0311     Glucose 297 mg/dL      BUN 11 mg/dL      Creatinine 0.76 mg/dL      Sodium 135 mmol/L      Potassium 3.7 mmol/L      Chloride 100 mmol/L      CO2 " 25.0 mmol/L      Calcium 8.9 mg/dL      Total Protein 6.1 g/dL      Albumin 3.7 g/dL      ALT (SGPT) 43 U/L      AST (SGOT) 22 U/L      Alkaline Phosphatase 72 U/L      Total Bilirubin 0.8 mg/dL      Globulin 2.4 gm/dL      A/G Ratio 1.5 g/dL      BUN/Creatinine Ratio 14.5     Anion Gap 10.0 mmol/L      eGFR 125.6 mL/min/1.73      Comment: National Kidney Foundation and American Society of Nephrology (ASN) Task Force recommended calculation based on the Chronic Kidney Disease Epidemiology Collaboration (CKD-EPI) equation refit without adjustment for race.       Narrative:      GFR Normal >60  Chronic Kidney Disease <60  Kidney Failure <15      Troponin [[id number]]  (Normal) Collected: 01/10/23 0216    Specimen: Blood Updated: 01/10/23 0309     Troponin T <0.010 ng/mL     Narrative:      Troponin T Reference Range:  <= 0.03 ng/mL-   Negative for AMI  >0.03 ng/mL-     Abnormal for myocardial necrosis.  Clinicians would have to utilize clinical acumen, EKG, Troponin and serial changes to determine if it is an Acute Myocardial Infarction or myocardial injury due to an underlying chronic condition.       Results may be falsely decreased if patient taking Biotin.      CBC & Differential [[id number]]  (Abnormal) Collected: 01/10/23 0216    Specimen: Blood Updated: 01/10/23 0257    Narrative:      The following orders were created for panel order CBC & Differential.  Procedure                               Abnormality         Status                     ---------                               -----------         ------                     CBC Auto Differential[[id number]]        Abnormal            Final result               Scan Slide[[id number]]                                                                    Please view results for these tests on the individual orders.    CBC Auto Differential [[id number]]  (Abnormal) Collected: 01/10/23 0216    Specimen: Blood Updated: 01/10/23 0257     WBC 11.12 10*3/mm3      RBC 5.07  10*6/mm3      Hemoglobin 14.1 g/dL      Hematocrit 42.2 %      MCV 83.2 fL      MCH 27.8 pg      MCHC 33.4 g/dL      RDW 13.6 %      RDW-SD 40.9 fl      MPV 10.2 fL      Platelets 254 10*3/mm3      Neutrophil % 50.2 %      Lymphocyte % 37.4 %      Monocyte % 6.8 %      Eosinophil % 4.5 %      Basophil % 0.5 %      Immature Grans % 0.6 %      Neutrophils, Absolute 5.57 10*3/mm3      Lymphocytes, Absolute 4.16 10*3/mm3      Monocytes, Absolute 0.76 10*3/mm3      Eosinophils, Absolute 0.50 10*3/mm3      Basophils, Absolute 0.06 10*3/mm3      Immature Grans, Absolute 0.07 10*3/mm3      nRBC 0.0 /100 WBC     Lipid Panel [[id number]]  (Abnormal) Collected: 01/09/23 2330    Specimen: Blood Updated: 01/10/23 0053     Total Cholesterol 205 mg/dL      Triglycerides 418 mg/dL      HDL Cholesterol 29 mg/dL      LDL Cholesterol  105 mg/dL      VLDL Cholesterol 71 mg/dL      LDL/HDL Ratio 3.19    Narrative:      Cholesterol Reference Ranges  (U.S. Department of Health and Human Services ATP III Classifications)    Desirable          <200 mg/dL  Borderline High    200-239 mg/dL  High Risk          >240 mg/dL      Triglyceride Reference Ranges  (U.S. Department of Health and Human Services ATP III Classifications)    Normal           <150 mg/dL  Borderline High  150-199 mg/dL  High             200-499 mg/dL  Very High        >500 mg/dL    HDL Reference Ranges  (U.S. Department of Health and Human Services ATP III Classifications)    Low     <40 mg/dl (major risk factor for CHD)  High    >60 mg/dl ('negative' risk factor for CHD)        LDL Reference Ranges  (U.S. Department of Health and Human Services ATP III Classifications)    Optimal          <100 mg/dL  Near Optimal     100-129 mg/dL  Borderline High  130-159 mg/dL  High             160-189 mg/dL  Very High        >189 mg/dL    Troponin [[id number]]  (Normal) Collected: 01/09/23 2330    Specimen: Blood Updated: 01/09/23 2353     Troponin T <0.010 ng/mL     Narrative:       Troponin T Reference Range:  <= 0.03 ng/mL-   Negative for AMI  >0.03 ng/mL-     Abnormal for myocardial necrosis.  Clinicians would have to utilize clinical acumen, EKG, Troponin and serial changes to determine if it is an Acute Myocardial Infarction or myocardial injury due to an underlying chronic condition.       Results may be falsely decreased if patient taking Biotin.      Extra Tubes [[id number]] Collected: 01/09/23 1922    Specimen: Blood, Venous Line Updated: 01/09/23 2031    Narrative:      The following orders were created for panel order Extra Tubes.  Procedure                               Abnormality         Status                     ---------                               -----------         ------                     Gold Top - SST[[id number]]                                   Final result                 Please view results for these tests on the individual orders.    Gold Top - SST [[id number]] Collected: 01/09/23 1922    Specimen: Blood Updated: 01/09/23 2031     Extra Tube Hold for add-ons.     Comment: Auto resulted.       Protime-INR [[id number]]  (Normal) Collected: 01/09/23 1917    Specimen: Blood Updated: 01/09/23 1955     Protime 13.4 Seconds      INR 1.03    Narrative:      Therapeutic range for most indications is 2.0-3.0 INR,  or 2.5-3.5 for mechanical heart valves.    Comprehensive Metabolic Panel [[id number]]  (Abnormal) Collected: 01/09/23 1917    Specimen: Blood Updated: 01/09/23 1946     Glucose 280 mg/dL      BUN 10 mg/dL      Creatinine 0.69 mg/dL      Sodium 135 mmol/L      Potassium 3.4 mmol/L      Chloride 101 mmol/L      CO2 23.0 mmol/L      Calcium 8.7 mg/dL      Total Protein 6.7 g/dL      Albumin 3.8 g/dL      ALT (SGPT) 46 U/L      AST (SGOT) 20 U/L      Alkaline Phosphatase 79 U/L      Total Bilirubin 0.7 mg/dL      Globulin 2.9 gm/dL      A/G Ratio 1.3 g/dL      BUN/Creatinine Ratio 14.5     Anion Gap 11.0 mmol/L      eGFR 129.3 mL/min/1.73      Comment: National Kidney  Foundation and American Society of Nephrology (ASN) Task Force recommended calculation based on the Chronic Kidney Disease Epidemiology Collaboration (CKD-EPI) equation refit without adjustment for race.       Narrative:      GFR Normal >60  Chronic Kidney Disease <60  Kidney Failure <15      CK [[id number]]  (Normal) Collected: 01/09/23 1917    Specimen: Blood Updated: 01/09/23 1946     Creatine Kinase 65 U/L     Troponin [[id number]]  (Normal) Collected: 01/09/23 1917    Specimen: Blood Updated: 01/09/23 1946     Troponin T <0.010 ng/mL     Narrative:      Troponin T Reference Range:  <= 0.03 ng/mL-   Negative for AMI  >0.03 ng/mL-     Abnormal for myocardial necrosis.  Clinicians would have to utilize clinical acumen, EKG, Troponin and serial changes to determine if it is an Acute Myocardial Infarction or myocardial injury due to an underlying chronic condition.       Results may be falsely decreased if patient taking Biotin.      BNP [[id number]]  (Normal) Collected: 01/09/23 1917    Specimen: Blood Updated: 01/09/23 1943     proBNP 47.2 pg/mL     Narrative:      Among patients with dyspnea, NT-proBNP is highly sensitive for the detection of acute congestive heart failure. In addition NT-proBNP of <300 pg/ml effectively rules out acute congestive heart failure with 99% negative predictive value.      CBC & Differential [[id number]]  (Abnormal) Collected: 01/09/23 1917    Specimen: Blood Updated: 01/09/23 1924    Narrative:      The following orders were created for panel order CBC & Differential.  Procedure                               Abnormality         Status                     ---------                               -----------         ------                     CBC Auto Differential[[id number]]        Abnormal            Final result                 Please view results for these tests on the individual orders.    CBC Auto Differential [[id number]]  (Abnormal) Collected: 01/09/23 1917    Specimen: Blood Updated:  01/09/23 1924     WBC 9.73 10*3/mm3      RBC 5.36 10*6/mm3      Hemoglobin 14.8 g/dL      Hematocrit 44.0 %      MCV 82.1 fL      MCH 27.6 pg      MCHC 33.6 g/dL      RDW 13.5 %      RDW-SD 39.5 fl      MPV 9.8 fL      Platelets 283 10*3/mm3      Neutrophil % 57.3 %      Lymphocyte % 34.1 %      Monocyte % 4.7 %      Eosinophil % 3.0 %      Basophil % 0.5 %      Immature Grans % 0.4 %      Neutrophils, Absolute 5.57 10*3/mm3      Lymphocytes, Absolute 3.32 10*3/mm3      Monocytes, Absolute 0.46 10*3/mm3      Eosinophils, Absolute 0.29 10*3/mm3      Basophils, Absolute 0.05 10*3/mm3      Immature Grans, Absolute 0.04 10*3/mm3      nRBC 0.0 /100 WBC         XR Chest 1 View    Result Date: 1/9/2023  XR CHEST 1 VIEW COMPARISONS: March 7, 2022 ADDITIONAL PERTINENT HISTORY: Chest pain FINDINGS: Cardiomediastinal silhouette:  Negative. Pulmonary vasculature:  Negative. Lung fields:  Negative. Pleural spaces: Negative. Osseous structures:  Negative. Surrounding soft tissues:  Negative.     No acute cardiopulmonary disease. Electronically signed by:  Jose J Glasgow MD  1/9/2023 8:16 PM CST Workstation: [id number]     HOSPITAL COURSE:    Patient is a 27-year-old male who was admitted for chest pain. Trops were negative times 3, CXR normal and EKG showed normal sinus rhythm. Heart score was 2. Given follow up with PCP. Of note, patient had an A1c over 11. Glucose was high throughout admission but controlled. Discussed insulin and other diabetes medications. Patient was given insulin, metformin refill, jardiance. Regimen discussed multiple times. Discussed case with his PCP in person. Patient to follow up in one week. Importance of adherence to medical regimen and follow up reiterated. Patient happy and comfortable with discharge.     DISCHARGE CONDITION:   stable    DISPOSITION:  Home or Self Care    DISCHARGE MEDICATIONS     Discharge Medications      New Medications      Instructions Start Date   [facility]ocate Insulin Pen Needles  31G X 8 MM misc  Generic drug: Insulin Pen Needle   1 each, Does not apply, 4 Times Daily      empagliflozin 10 MG tablet tablet  Commonly known as: Jardiance   10 mg, Oral, Daily      glucose monitoring kit monitoring kit   1 each, Does not apply, As Needed      insulin detemir 100 UNIT/ML injection  Commonly known as: LEVEMIR   8 Units, Subcutaneous, Daily      Insulin Lispro (1 Unit Dial) 100 UNIT/ML solution pen-injector  Commonly known as: HUMALOG   3 Units, Subcutaneous, 3 Times Daily With Meals         Continue These Medications      Instructions Start Date   albuterol sulfate  (90 Base) MCG/ACT inhaler  Commonly known as: Ventolin HFA   2 puffs, Inhalation, Every 4 Hours PRN      atorvastatin 10 MG tablet  Commonly known as: LIPITOR   10 mg, Oral, Daily      metFORMIN  MG 24 hr tablet  Commonly known as: GLUCOPHAGE-XR   1,000 mg, Oral, 2 Times Daily Before Meals      metFORMIN 500 MG tablet  Commonly known as: GLUCOPHAGE   500 mg, Oral, 2 Times Daily With Meals      montelukast 10 MG tablet  Commonly known as: SINGULAIR   10 mg, Oral, Nightly      promethazine-dextromethorphan 6.25-15 MG/5ML syrup  Commonly known as: PROMETHAZINE-DM   5 mL, Oral, 4 Times Daily PRN             INSTRUCTIONS:  Activity:   Activity Instructions     Activity as Tolerated          Diet:   Diet Instructions     Diet: Consistent Carbohydrate      Discharge Diet: Consistent Carbohydrate          FOLLOW UP:   Additional Instructions for the Follow-ups that You Need to Schedule     Discharge Follow-up with PCP   As directed       Currently Documented PCP:    Man Arambula MD    PCP Phone Number:    [phone]     Follow Up Details: 1 week, for in person or video visit.            Follow-up Information     Man Arambula MD .    Specialty: Family Medicine  Why: 1 week, for in person or video visit.  Contact information:  [facility] [address] [place] [address] [phone]                         PENDING TEST RESULTS AT  DISCHARGE      Time: >30 minutes were spent in discharge planning, medication reconciliation and coordination of care for this patient.    Rivas Ortiz MD is the attending at time of discharge, He is aware of the patient's status and agrees with the above discharge summary.       Miguelangel Landrum MD   [address]    [place] [address]  Office: [phone]    This document has been electronically signed by Miguelangel Landrum MD on January 11, 2023 14:33 CST

## 2023-01-11 NOTE — OUTREACH NOTE
Call Center TCM Note    Flowsheet Row Responses   [facility] facility patient discharged from? [place]   Does the patient have one of the following disease processes/diagnoses(primary or secondary)? Stroke   TCM attempt successful? No  [no verbal release]   Unsuccessful attempts Attempt 1          Sirena Pearson RN    1/11/2023, 13:30 CST

## 2023-01-11 NOTE — OUTREACH NOTE
Call Center TCM Note    Flowsheet Row Responses   [facility] facility patient discharged from? [place]   Does the patient have one of the following disease processes/diagnoses(primary or secondary)? Stroke   TCM attempt successful? No   Unsuccessful attempts Attempt 2          Sirena Pearson RN    1/11/2023, 15:45 EST

## 2023-01-12 ENCOUNTER — TRANSITIONAL CARE MANAGEMENT TELEPHONE ENCOUNTER (OUTPATIENT)
Dept: CALL CENTER | Facility: HOSPITAL | Age: 28
End: 2023-01-12
Payer: MEDICAID

## 2023-01-12 NOTE — OUTREACH NOTE
Call Center TCM Note    Flowsheet Row Responses   [facility] patient discharged from? [place]   Does the patient have one of the following disease processes/diagnoses(primary or secondary)? Stroke   TCM attempt successful? No   Unsuccessful attempts Attempt 3          Gabo Gautam RN    1/12/2023, 14:12 CST

## 2023-01-14 ENCOUNTER — TELEPHONE (OUTPATIENT)
Dept: FAMILY MEDICINE CLINIC | Facility: CLINIC | Age: 28
End: 2023-01-14
Payer: MEDICAID

## 2023-01-14 NOTE — TELEPHONE ENCOUNTER
Called patient to touch base after his recent hospitalization.  Left message as he did not answer the phone.    This document has been electronically signed by Man Arambula MD on January 14, 2023 16:52 CST

## 2023-06-01 ENCOUNTER — OFFICE VISIT (OUTPATIENT)
Dept: FAMILY MEDICINE CLINIC | Facility: CLINIC | Age: 28
End: 2023-06-01
Payer: MEDICAID

## 2023-06-01 VITALS
SYSTOLIC BLOOD PRESSURE: 124 MMHG | HEIGHT: 66 IN | HEART RATE: 91 BPM | OXYGEN SATURATION: 99 % | WEIGHT: 315 LBS | BODY MASS INDEX: 50.62 KG/M2 | DIASTOLIC BLOOD PRESSURE: 92 MMHG

## 2023-06-01 DIAGNOSIS — E66.01 MORBID OBESITY WITH BMI OF 60.0-69.9, ADULT: ICD-10-CM

## 2023-06-01 DIAGNOSIS — E11.65 TYPE 2 DIABETES MELLITUS WITH HYPERGLYCEMIA, WITHOUT LONG-TERM CURRENT USE OF INSULIN: Primary | ICD-10-CM

## 2023-06-01 RX ORDER — BLOOD SUGAR DIAGNOSTIC
1 STRIP MISCELLANEOUS 4 TIMES DAILY
Qty: 100 EACH | Refills: 0 | Status: SHIPPED | OUTPATIENT
Start: 2023-06-01

## 2023-06-01 RX ORDER — INSULIN LISPRO 100 [IU]/ML
5 INJECTION, SOLUTION INTRAVENOUS; SUBCUTANEOUS
Qty: 15 ML | Refills: 0 | Status: SHIPPED | OUTPATIENT
Start: 2023-06-01

## 2023-06-01 NOTE — PROGRESS NOTES
Family Medicine Residency  Man Arambula MD    Subjective:     Venkatesh Parra is a 27 y.o. male who presents for morbid obesity and type 2 diabetes.  I last saw this patient on 2/23/2022 for folliculitis and morbid obesity.  I do note that earlier this year he was in the ER for some chest pain.  HbA1c in January of this year was 11.8.  He reports that he has not filled his Jardiance, his long-acting insulin, his short acting insulin, his insulin pen, or his Victoza.    The following portions of the patient's history were reviewed and updated as appropriate: allergies, current medications, past family history, past medical history, past social history, past surgical history and problem list.    Past Medical Hx:  Past Medical History:   Diagnosis Date    Asthma     Carpal tunnel syndrome     Depression     Diabetes mellitus     Hypertension     Morbid obesity     Osteoarthritis     PTSD (post-traumatic stress disorder)        Past Surgical Hx:  Past Surgical History:   Procedure Laterality Date    CHOLECYSTECTOMY         Current Meds:    Current Outpatient Medications:     albuterol sulfate HFA (Ventolin HFA) 108 (90 Base) MCG/ACT inhaler, Inhale 2 puffs Every 4 (Four) Hours As Needed for Wheezing., Disp: 18 g, Rfl: 0    atorvastatin (LIPITOR) 10 MG tablet, Take 1 tablet by mouth Daily., Disp: , Rfl:     empagliflozin (Jardiance) 10 MG tablet tablet, Take 1 tablet by mouth Daily., Disp: 30 tablet, Rfl: 2    glucose monitoring kit (FREESTYLE) monitoring kit, 1 each As Needed (glucose readings)., Disp: 1 each, Rfl: 0    insulin detemir (LEVEMIR) 100 UNIT/ML injection, Inject 10 Units under the skin into the appropriate area as directed Daily., Disp: 15 mL, Rfl: 0    Insulin Lispro, 1 Unit Dial, (HUMALOG) 100 UNIT/ML solution pen-injector, Inject 5 Units under the skin into the appropriate area as directed 3 (Three) Times a Day With Meals., Disp: 15 mL, Rfl: 0    Insulin Pen Needle (Advocate Insulin Pen Needles) 31G  "X 8 MM misc, 1 each 4 (Four) Times a Day., Disp: 100 each, Rfl: 0    metFORMIN ER (GLUCOPHAGE-XR) 500 MG 24 hr tablet, Take 2 tablets by mouth 2 (Two) Times a Day Before Meals for 120 days., Disp: 120 tablet, Rfl: 3    glucose blood test strip, Please fill for test strips compatible with patient's freestyle monitor.  Use as instructed, Disp: 100 each, Rfl: 2    montelukast (SINGULAIR) 10 MG tablet, Take 1 tablet by mouth Every Night for 30 days., Disp: 30 tablet, Rfl: 3    Allergies:  Allergies   Allergen Reactions    Lorcet [Hydrocodone-Acetaminophen] Mental Status Change    Amoxicillin Rash    Penicillins Rash     redness       Family Hx:  Family History   Problem Relation Age of Onset    Hypertension Father         Social History:  Social History     Socioeconomic History    Marital status: Single   Tobacco Use    Smoking status: Former     Packs/day: 1.00     Types: Cigarettes    Smokeless tobacco: Current     Types: Chew   Vaping Use    Vaping Use: Never used   Substance and Sexual Activity    Alcohol use: Not Currently    Drug use: Never    Sexual activity: Not Currently       Review of Systems  Review of Systems   Constitutional:  Negative for fatigue and fever.   HENT:  Negative for congestion, rhinorrhea and trouble swallowing.    Eyes:  Negative for visual disturbance.   Respiratory:  Negative for cough and shortness of breath.    Cardiovascular:  Negative for chest pain.   Gastrointestinal:  Negative for blood in stool, constipation and diarrhea.   Genitourinary:  Negative for dysuria and hematuria.   Musculoskeletal:  Negative for gait problem.   Skin:  Negative for rash.   Neurological:  Negative for syncope and light-headedness.   Psychiatric/Behavioral:  Negative for confusion, hallucinations and sleep disturbance.      Objective:     /92   Pulse 91   Ht 167.6 cm (66\")   Wt (!) 166 kg (365 lb 8 oz)   SpO2 99%   BMI 58.99 kg/m²   Physical Exam  Vitals reviewed.   Constitutional:       " Appearance: He is obese. He is not ill-appearing or diaphoretic.   HENT:      Head: Atraumatic.      Mouth/Throat:      Mouth: Mucous membranes are moist.   Eyes:      General: No scleral icterus.        Right eye: No discharge.         Left eye: No discharge.   Neck:      Vascular: No carotid bruit.   Cardiovascular:      Rate and Rhythm: Normal rate and regular rhythm.      Pulses: Normal pulses.      Heart sounds: No murmur heard.    No gallop.   Pulmonary:      Effort: No respiratory distress.      Breath sounds: No wheezing.   Abdominal:      General: Bowel sounds are normal.      Tenderness: There is no abdominal tenderness.   Musculoskeletal:      Right lower leg: No edema.      Left lower leg: No edema.   Skin:     Capillary Refill: Capillary refill takes less than 2 seconds.      Findings: No lesion or rash.   Neurological:      General: No focal deficit present.      Mental Status: He is alert.      Sensory: No sensory deficit.      Motor: No weakness.   Psychiatric:         Mood and Affect: Mood normal.         Thought Content: Thought content normal.         Judgment: Judgment normal.      Assessment/Plan:     Diagnoses and all orders for this visit:    1. Type 2 diabetes mellitus with hyperglycemia, without long-term current use of insulin (Primary)  This patient has a long history of uncontrolled diabetes.  He admits that he never filled his recent prescriptions for insulin as well as several of his other diabetes medications.  He just did not want to give himself shots.  Unfortunately, he did measure his glucose this morning and it was 239 before breakfast.  His last HbA1c was 11.8.  We had a long discussion about the negative effects of uncontrolled diabetes on his retina, heart, kidneys, and feet.  Since he was in the ER recently with some chest pain we talked about this as well.  At this point he is willing to try an insulin regimen which is what he needs.  He is going to take long-acting insulin  at night 10 units.  He is going to start on short acting insulin 5 units 3 times a day with meals.  Is going to start taking the Jardiance.  He is going to take metformin 1000 twice daily.  He is going to return in 1 month for a follow-up visit.  He understands the plan and is in agreement with that.  We will hold Victoza for now.  -     Insulin Pen Needle (Advocate Insulin Pen Needles) 31G X 8 MM misc; 1 each 4 (Four) Times a Day.  Dispense: 100 each; Refill: 0  -     insulin detemir (LEVEMIR) 100 UNIT/ML injection; Inject 10 Units under the skin into the appropriate area as directed Daily.  Dispense: 15 mL; Refill: 0  -     Insulin Lispro, 1 Unit Dial, (HUMALOG) 100 UNIT/ML solution pen-injector; Inject 5 Units under the skin into the appropriate area as directed 3 (Three) Times a Day With Meals.  Dispense: 15 mL; Refill: 0  -     metFORMIN ER (GLUCOPHAGE-XR) 500 MG 24 hr tablet; Take 2 tablets by mouth 2 (Two) Times a Day Before Meals for 120 days.  Dispense: 120 tablet; Refill: 3  -     empagliflozin (Jardiance) 10 MG tablet tablet; Take 1 tablet by mouth Daily.  Dispense: 30 tablet; Refill: 2  -     glucose blood test strip; Please fill for test strips compatible with patient's freestyle monitor.  Use as instructed  Dispense: 100 each; Refill: 2    2. Morbid obesity with BMI of 60.0-69.9, adult  This patient's highest weight ever was 450 pounds.  Last year in February he weighed 394 pounds.  Today he weighs 365 pounds.  His weight loss journey continues.  I advised him that if he would control his weight better we could decrease some of the diabetes medications eventually.  He is going to try to diet and exercise better.  -     Insulin Pen Needle (Advocate Insulin Pen Needles) 31G X 8 MM misc; 1 each 4 (Four) Times a Day.  Dispense: 100 each; Refill: 0  -     insulin detemir (LEVEMIR) 100 UNIT/ML injection; Inject 10 Units under the skin into the appropriate area as directed Daily.  Dispense: 15 mL; Refill:  0  -     Insulin Lispro, 1 Unit Dial, (HUMALOG) 100 UNIT/ML solution pen-injector; Inject 5 Units under the skin into the appropriate area as directed 3 (Three) Times a Day With Meals.  Dispense: 15 mL; Refill: 0        Rx changes: Restart Jardiance, Levemir, Humalog.  Patient Education: Importance of adhering to his diabetic regimen.  Compliance at present is estimated to be good.   Efforts to improve compliance (if necessary) will be directed at regular blood sugar monitoring: 3 times daily.       Follow-up:     Return in about 4 weeks (around 6/29/2023).    Preventative:  Health Maintenance   Topic Date Due    Hepatitis B (1 of 3 - 3-dose series) Never done    URINE MICROALBUMIN  Never done    COVID-19 Vaccine (1) Never done    Pneumococcal Vaccine 0-64 (1 - PCV) Never done    HEPATITIS C SCREENING  Never done    ANNUAL PHYSICAL  Never done    DIABETIC FOOT EXAM  Never done    HEMOGLOBIN A1C  07/10/2023    INFLUENZA VACCINE  08/01/2023    DIABETIC EYE EXAM  10/18/2023    LIPID PANEL  01/09/2024    TDAP/TD VACCINES (3 - Td or Tdap) 11/01/2026       Weight  -Class: Obese Class III extreme obesity: > or equal to 40kg/m2  -Class 3 Severe Obesity (BMI >=40). Obesity-related health conditions include the following: coronary heart disease and diabetes mellitus. Obesity is improving with treatment. BMI is is above average; BMI management plan is completed. We discussed portion control and increasing exercise.   eat more fruits and vegetables, decrease soda or juice intake, increase water intake, increase physical activity, reduce screen time, reduce portion size, and reduce fast food intake    Alcohol use:  reports that he does not currently use alcohol.  Nicotine status  reports that he has quit smoking. His smoking use included cigarettes. He smoked an average of 1 pack per day. His smokeless tobacco use includes chew.     Goals    None             This document has been electronically signed by Man Arambula MD on  June 5, 2023 12:16 CDT

## 2023-06-05 RX ORDER — METFORMIN HYDROCHLORIDE 500 MG/1
1000 TABLET, EXTENDED RELEASE ORAL
Qty: 120 TABLET | Refills: 3 | Status: SHIPPED | OUTPATIENT
Start: 2023-06-05 | End: 2023-10-03

## 2023-06-05 NOTE — PROGRESS NOTES
I have spoken with the patient .  I have reviewed the notes, assessments, and/or procedures performed by Dr. Man Arambula,   I concur with   his  documentation and assessment and plan for Venkatesh Parra.          This document has been electronically signed by Kiet Arango MD on June 5, 2023 15:26 CDT

## 2023-11-09 NOTE — PROGRESS NOTES
Stroke Progress Note       Chief Complaint: Left-sided numbness and blurred vision    Subjective     HPI: Pt is a 26-yr-old right-handed white male with known diagnosis of hypertension, DM2, morbid obesity, JESUS, and asthma who presented with left-side numbness and blurred vision. Today he states feeling back to baseline. Strengths are equal 5/5, denies numbness, and visual field is intact.        Review of Systems   HENT: Negative.    Eyes: Negative.    Respiratory: Negative.    Cardiovascular: Negative.    Gastrointestinal: Negative.    Genitourinary: Negative.    Musculoskeletal: Negative.    Skin: Negative.    Neurological: Negative.    Hematological: Negative.    Psychiatric/Behavioral: Negative.         Objective      Temp:  [97.6 °F (36.4 °C)-98 °F (36.7 °C)] 97.7 °F (36.5 °C)  Heart Rate:  [73-94] 91  Resp:  [18-24] 18  BP: (103-168)/(58-96) 127/89    Neurological Exam  Mental Status  Awake and alert. Oriented to person, place, time and situation. Oriented to person, place, and time. Speech is normal. Language is fluent with no aphasia. Attention and concentration are normal.    Cranial Nerves  CN II: Visual acuity is normal. Visual fields full to confrontation.  CN III, IV, VI: Extraocular movements intact bilaterally. Normal lids and orbits bilaterally. Pupils equal round and reactive to light bilaterally.  CN V: Facial sensation is normal.  CN VII: Full and symmetric facial movement.  CN IX, X: Palate elevates symmetrically. Normal gag reflex.  CN XI: Shoulder shrug strength is normal.  CN XII: Tongue midline without atrophy or fasciculations.    Motor  Normal muscle bulk throughout. No fasciculations present. Strength is 5/5 throughout all four extremities.    Sensory  Sensation is intact to light touch, pinprick, vibration and proprioception in all four extremities.    Reflexes  Not assessed.    Coordination    Finger-to-nose, rapid alternating movements and heel-to-shin normal bilaterally without  dysmetria.    Gait  Normal casual, toe, heel and tandem gait.      Physical Exam  Vitals and nursing note reviewed.   Constitutional:       General: He is awake.      Appearance: Normal appearance.   HENT:      Head: Normocephalic and atraumatic.   Eyes:      General: Lids are normal.      Extraocular Movements: Extraocular movements intact.      Pupils: Pupils are equal, round, and reactive to light.   Cardiovascular:      Rate and Rhythm: Normal rate.   Pulmonary:      Effort: Pulmonary effort is normal.   Musculoskeletal:         General: Normal range of motion.      Cervical back: Normal range of motion.   Skin:     General: Skin is warm and dry.   Neurological:      Mental Status: He is alert and oriented to person, place, and time. Mental status is at baseline.      Coordination: Coordination is intact.      Deep Tendon Reflexes: Strength normal.   Psychiatric:         Mood and Affect: Mood normal.         Speech: Speech normal.         Results Review:    I reviewed the patient's new clinical results.    Lab Results (last 24 hours)     Procedure Component Value Units Date/Time    POC Glucose Once [[id number]]  (Abnormal) Collected: 03/09/22 0554    Specimen: Blood Updated: 03/09/22 0627     Glucose 257 mg/dL      Comment: : [phone] [employer] MARSHALLMeter ID: [id number]       POC Glucose Once [[id number]]  (Abnormal) Collected: 03/08/22 2354    Specimen: Blood Updated: 03/09/22 0007     Glucose 243 mg/dL      Comment: : [phone] CHALOELL MARSHALLMeter ID: [id number]       POC Glucose Once [[id number]]  (Abnormal) Collected: 03/08/22 2059    Specimen: Blood Updated: 03/08/22 2110     Glucose 287 mg/dL      Comment: : [phone] [employer] MARSHALLMeter ID: [id number]       POC Glucose Once [[id number]]  (Abnormal) Collected: 03/08/22 1636    Specimen: Blood Updated: 03/08/22 1846     Glucose 228 mg/dL      Comment: : [id number] ALEX PEDRAZAMeter ID: [id number]       POC  Glucose Once [[id number]]  (Abnormal) Collected: 03/08/22 1231    Specimen: Blood Updated: 03/08/22 1403     Glucose 449 mg/dL      Comment: : [phone] ALEX Schaefer ID: [id number]           CT Head Without Contrast    Result Date: 3/8/2022  Limited study technically. Grossly normal unenhanced CT scan of the brain. Electronically signed by:  Marlon Mnazo MD  3/8/2022 2:18 PM CST Workstation: [id number]    CT Angiogram Neck    Result Date: 3/7/2022  Normal CT angiogram carotid arteries/neck. Normal CT angiogram intracranial circulation. Unremarkable CT perfusion study. No evidence of infarct. Electronically signed by:  Jonnie Jordan MD  3/7/2022 2:41 PM CST Workstation: [employer]    XR Chest 1 View    Result Date: 3/7/2022  No evidence of active disease. Electronically signed by:  Jonnie Jordan MD  3/7/2022 1:57 PM CST Workstation: [employer]    CT Head Without Contrast Stroke Protocol    Result Date: 3/7/2022  Normal CT of the head. Electronically signed by:  Parish Huynh MD  3/7/2022 1:22 PM CST Workstation: [id number]    CT Angiogram Head w AI Analysis of LVO    Result Date: 3/7/2022  Normal CT angiogram carotid arteries/neck. Normal CT angiogram intracranial circulation. Unremarkable CT perfusion study. No evidence of infarct. Electronically signed by:  Jonnie Jordan MD  3/7/2022 2:41 PM CST Workstation: [employer]    CT CEREBRAL PERFUSION WITH & WITHOUT CONTRAST    Result Date: 3/7/2022  Normal CT angiogram carotid arteries/neck. Normal CT angiogram intracranial circulation. Unremarkable CT perfusion study. No evidence of infarct. Electronically signed by:  Jonnie Jordan MD  3/7/2022 2:41 PM CST Workstation: [employer] Ace    Results for orders placed during the hospital encounter of 03/07/22    Adult Transthoracic Echo Complete W/ Cont if Necessary Per Protocol (With Agitated Saline)    Interpretation Summary  · Left ventricular wall thickness is consistent with mild concentric hypertrophy.  · Left ventricular  ejection fraction appears to be 56 - 60%.  · Left ventricular diastolic function was normal.  · Estimated right ventricular systolic pressure from tricuspid regurgitation is normal (<35 mmHg).  · Saline test results are negative.        Assessment/Plan     Assessment/Plan: Pt is a 26-yr-old right-handed white male with known diagnosis of hypertension, DM2, morbid obesity, JESUS, and asthma who presented with left-side numbness and blurred vision. Today he states feeling back to baseline. Strengths are equal 5/5, denies numbness, and visual field is intact.  1. Left-sided numbness- Resolved, possible TIA, repeat CT was neg. Continue aspirin 325 mg/day and Lipitor 80 mg.   2. Hypertension- Normal BP goals. Instructed on the importance of daily BP monitoring and control to reduce stroke risk.  3. DM2- A1C is 12.9, instructed on the importance of glucose control to reduce stroke risk. Pt already had an appointment with Dr Kilgore in April. Inpt nutritionist following him and doing diabetic education.  4. Activity- continue to work with PT/OT today.  5. Diet- ADA heart-healthy diet.  Case was discussed with pt, Dr. Feldman, Hospitalist team, and nursing. Neurology will sign off.                  Patient Active Problem List   Diagnosis   • Syncope and collapse   • Weakness   • Transient ischemic attack (TIA)   • Hypertension   • Morbid obesity (HCC)   • Diabetes mellitus (HCC)   • Left-sided back pain   • Stroke-like symptom           WILFREDO Velasquez  03/09/22  08:56 CST       Has hx of Crohn's dz. Pain has been present for months. Having BMs, no diarrhea, constipation or bloody BMs. Pain predominantly in the RLQ, LLQ, and LUQ. DDx: Colitis vs constipation vs gastric ulcer vs less likely gastroenteritis    -Zofran 4mg PRN q6  -c/w home PPI  -CT Abd to r/o acute flair vs malignancy   - plan for potential EGD tomorrow   -Outpt GI f/up   -Collateral from PCP Dr. Benitez Birch in AM Has hx of Crohn's dz. Pain has been present for months. Having BMs, no diarrhea, constipation or bloody BMs. Pain predominantly in the RLQ, LLQ, and LUQ. DDx: Colitis vs constipation vs gastric ulcer vs less likely gastroenteritis  CT A/P: underlying neoplasm is not excluded. Consider follow-up with colonoscopy if not recently performed, plan   EGD:  erosive gastritis, grade C esophagitis, hiatal hernia, grade 1 varices   - Plan for colonoscopy today  - Zofran 4mg PRN q6  - c/w home PPI Has hx of Crohn's dz. Pain has been present for months. Having BMs, no diarrhea, constipation or bloody BMs. Pain predominantly in the RLQ, LLQ, and LUQ. DDx: Colitis vs constipation vs gastric ulcer vs less likely gastroenteritis  CT A/P: underlying neoplasm is not excluded. Consider follow-up with colonoscopy if not recently performed, plan for colonoscopy tomorrow (11/8)   EGD:  erosive gastritis, grade C esophagitis, hiatal hernia, grade 1 varices   - Plan for colonoscopy 11/8  - NPO at midnight   - Golytly prep   - Zofran 4mg PRN q6  - c/w home PPI